# Patient Record
Sex: MALE | Race: WHITE | Employment: FULL TIME | URBAN - METROPOLITAN AREA
[De-identification: names, ages, dates, MRNs, and addresses within clinical notes are randomized per-mention and may not be internally consistent; named-entity substitution may affect disease eponyms.]

---

## 2017-03-02 ENCOUNTER — HOSPITAL ENCOUNTER (EMERGENCY)
Age: 52
Discharge: HOME | End: 2017-03-02
Attending: EMERGENCY MEDICINE
Payer: MEDICARE

## 2017-03-02 VITALS
RESPIRATION RATE: 16 BRPM | HEART RATE: 88 BPM | DIASTOLIC BLOOD PRESSURE: 102 MMHG | BODY MASS INDEX: 25.18 KG/M2 | OXYGEN SATURATION: 99 % | WEIGHT: 170 LBS | SYSTOLIC BLOOD PRESSURE: 146 MMHG | HEIGHT: 69 IN | TEMPERATURE: 97.8 F

## 2017-03-02 DIAGNOSIS — I10 ESSENTIAL HYPERTENSION: ICD-10-CM

## 2017-03-02 DIAGNOSIS — R42 EPISODIC LIGHTHEADEDNESS: Primary | ICD-10-CM

## 2017-03-02 LAB
CHP ED QC CHECK: YES
GLUCOSE BLD-MCNC: 124 MG/DL

## 2017-03-02 PROCEDURE — 82947 ASSAY GLUCOSE BLOOD QUANT: CPT

## 2017-03-02 PROCEDURE — 93005 ELECTROCARDIOGRAM TRACING: CPT

## 2017-03-02 PROCEDURE — 99284 EMERGENCY DEPT VISIT MOD MDM: CPT

## 2017-03-02 PROCEDURE — 6370000000 HC RX 637 (ALT 250 FOR IP): Performed by: EMERGENCY MEDICINE

## 2017-03-02 RX ORDER — LISINOPRIL 10 MG/1
10 TABLET ORAL DAILY
Qty: 30 TABLET | Refills: 0 | Status: SHIPPED | OUTPATIENT
Start: 2017-03-02 | End: 2018-05-04 | Stop reason: SDUPTHER

## 2017-03-02 RX ORDER — METOPROLOL TARTRATE 50 MG/1
50 TABLET, FILM COATED ORAL ONCE
Status: COMPLETED | OUTPATIENT
Start: 2017-03-02 | End: 2017-03-02

## 2017-03-02 RX ORDER — OXCARBAZEPINE 600 MG/1
600 TABLET, FILM COATED ORAL 2 TIMES DAILY
Status: ON HOLD | COMMUNITY
End: 2021-12-09 | Stop reason: HOSPADM

## 2017-03-02 RX ORDER — LISINOPRIL 10 MG/1
10 TABLET ORAL DAILY
COMMUNITY
End: 2017-03-02

## 2017-03-02 RX ORDER — BUPROPION HYDROCHLORIDE 300 MG/1
300 TABLET ORAL EVERY MORNING
Status: ON HOLD | COMMUNITY
End: 2021-12-09 | Stop reason: HOSPADM

## 2017-03-02 RX ORDER — LISINOPRIL 10 MG/1
10 TABLET ORAL DAILY
Status: DISCONTINUED | OUTPATIENT
Start: 2017-03-02 | End: 2017-03-02 | Stop reason: HOSPADM

## 2017-03-02 RX ADMIN — LISINOPRIL 10 MG: 10 TABLET ORAL at 17:42

## 2017-03-02 RX ADMIN — METOPROLOL TARTRATE 50 MG: 50 TABLET, FILM COATED ORAL at 17:42

## 2017-03-02 ASSESSMENT — ENCOUNTER SYMPTOMS
EYE REDNESS: 0
NAUSEA: 0
CONSTIPATION: 0
SHORTNESS OF BREATH: 0
ABDOMINAL PAIN: 0
EYE PAIN: 0
DIARRHEA: 0
RHINORRHEA: 0
VOMITING: 0
COUGH: 0

## 2017-03-03 LAB
EKG ATRIAL RATE: 72 BPM
EKG P AXIS: 57 DEGREES
EKG P-R INTERVAL: 162 MS
EKG Q-T INTERVAL: 406 MS
EKG QRS DURATION: 102 MS
EKG QTC CALCULATION (BAZETT): 444 MS
EKG R AXIS: 59 DEGREES
EKG T AXIS: 52 DEGREES
EKG VENTRICULAR RATE: 72 BPM
GLUCOSE BLD-MCNC: 124 MG/DL (ref 75–110)

## 2018-05-04 ENCOUNTER — HOSPITAL ENCOUNTER (OUTPATIENT)
Age: 53
Setting detail: SPECIMEN
Discharge: HOME | End: 2018-05-04
Payer: MEDICARE

## 2018-05-04 ENCOUNTER — OFFICE VISIT (OUTPATIENT)
Dept: INTERNAL MEDICINE | Age: 53
End: 2018-05-04
Payer: MEDICARE

## 2018-05-04 VITALS
WEIGHT: 246 LBS | BODY MASS INDEX: 36.43 KG/M2 | HEIGHT: 69 IN | HEART RATE: 63 BPM | SYSTOLIC BLOOD PRESSURE: 170 MMHG | DIASTOLIC BLOOD PRESSURE: 98 MMHG

## 2018-05-04 DIAGNOSIS — F17.200 SMOKING: ICD-10-CM

## 2018-05-04 DIAGNOSIS — Z12.11 COLON CANCER SCREENING: ICD-10-CM

## 2018-05-04 DIAGNOSIS — J30.9 CHRONIC ALLERGIC RHINITIS, UNSPECIFIED SEASONALITY, UNSPECIFIED TRIGGER: ICD-10-CM

## 2018-05-04 DIAGNOSIS — Z00.00 HEALTH CARE MAINTENANCE: ICD-10-CM

## 2018-05-04 DIAGNOSIS — I10 ESSENTIAL HYPERTENSION: ICD-10-CM

## 2018-05-04 DIAGNOSIS — F31.4 SEVERE BIPOLAR I DISORDER, CURRENT OR MOST RECENT EPISODE DEPRESSED (HCC): Chronic | ICD-10-CM

## 2018-05-04 DIAGNOSIS — I10 ESSENTIAL HYPERTENSION: Primary | ICD-10-CM

## 2018-05-04 LAB
ABSOLUTE EOS #: 0.09 K/UL (ref 0–0.44)
ABSOLUTE IMMATURE GRANULOCYTE: 0.1 K/UL (ref 0–0.3)
ABSOLUTE LYMPH #: 2.12 K/UL (ref 1.1–3.7)
ABSOLUTE MONO #: 1.13 K/UL (ref 0.1–1.2)
ALBUMIN SERPL-MCNC: 4.3 G/DL (ref 3.5–5.2)
ALBUMIN/GLOBULIN RATIO: 1.5 (ref 1–2.5)
ALP BLD-CCNC: 82 U/L (ref 40–129)
ALT SERPL-CCNC: 19 U/L (ref 5–41)
ANION GAP SERPL CALCULATED.3IONS-SCNC: 15 MMOL/L (ref 9–17)
AST SERPL-CCNC: 20 U/L
BASOPHILS # BLD: 1 % (ref 0–2)
BASOPHILS ABSOLUTE: 0.08 K/UL (ref 0–0.2)
BILIRUB SERPL-MCNC: 0.32 MG/DL (ref 0.3–1.2)
BUN BLDV-MCNC: 14 MG/DL (ref 6–20)
BUN/CREAT BLD: ABNORMAL (ref 9–20)
CALCIUM SERPL-MCNC: 8.9 MG/DL (ref 8.6–10.4)
CHLORIDE BLD-SCNC: 87 MMOL/L (ref 98–107)
CHOLESTEROL/HDL RATIO: 5.6
CHOLESTEROL: 219 MG/DL
CO2: 21 MMOL/L (ref 20–31)
CREAT SERPL-MCNC: 0.77 MG/DL (ref 0.7–1.2)
CREATININE URINE: 104.8 MG/DL (ref 39–259)
DIFFERENTIAL TYPE: ABNORMAL
EOSINOPHILS RELATIVE PERCENT: 1 % (ref 1–4)
ESTIMATED AVERAGE GLUCOSE: 117 MG/DL
GFR AFRICAN AMERICAN: >60 ML/MIN
GFR NON-AFRICAN AMERICAN: >60 ML/MIN
GFR SERPL CREATININE-BSD FRML MDRD: ABNORMAL ML/MIN/{1.73_M2}
GFR SERPL CREATININE-BSD FRML MDRD: ABNORMAL ML/MIN/{1.73_M2}
GLUCOSE BLD-MCNC: 99 MG/DL (ref 70–99)
HBA1C MFR BLD: 5.7 % (ref 4–6)
HCT VFR BLD CALC: 41 % (ref 40.7–50.3)
HDLC SERPL-MCNC: 39 MG/DL
HEMOGLOBIN: 14.7 G/DL (ref 13–17)
IMMATURE GRANULOCYTES: 1 %
LDL CHOLESTEROL: 162 MG/DL (ref 0–130)
LYMPHOCYTES # BLD: 16 % (ref 24–43)
MCH RBC QN AUTO: 29.8 PG (ref 25.2–33.5)
MCHC RBC AUTO-ENTMCNC: 35.9 G/DL (ref 28.4–34.8)
MCV RBC AUTO: 83 FL (ref 82.6–102.9)
MICROALBUMIN/CREAT 24H UR: 58 MG/L
MICROALBUMIN/CREAT UR-RTO: 55 MCG/MG CREAT
MONOCYTES # BLD: 8 % (ref 3–12)
NRBC AUTOMATED: 0 PER 100 WBC
PDW BLD-RTO: 13.9 % (ref 11.8–14.4)
PLATELET # BLD: 391 K/UL (ref 138–453)
PLATELET ESTIMATE: ABNORMAL
PMV BLD AUTO: 8.8 FL (ref 8.1–13.5)
POTASSIUM SERPL-SCNC: 4.3 MMOL/L (ref 3.7–5.3)
RBC # BLD: 4.94 M/UL (ref 4.21–5.77)
RBC # BLD: ABNORMAL 10*6/UL
SEG NEUTROPHILS: 73 % (ref 36–65)
SEGMENTED NEUTROPHILS ABSOLUTE COUNT: 9.97 K/UL (ref 1.5–8.1)
SODIUM BLD-SCNC: 123 MMOL/L (ref 135–144)
TOTAL PROTEIN: 7.1 G/DL (ref 6.4–8.3)
TRIGL SERPL-MCNC: 89 MG/DL
TSH SERPL DL<=0.05 MIU/L-ACNC: 0.97 MIU/L (ref 0.3–5)
VLDLC SERPL CALC-MCNC: ABNORMAL MG/DL (ref 1–30)
WBC # BLD: 13.5 K/UL (ref 3.5–11.3)
WBC # BLD: ABNORMAL 10*3/UL

## 2018-05-04 PROCEDURE — 83036 HEMOGLOBIN GLYCOSYLATED A1C: CPT

## 2018-05-04 PROCEDURE — 84443 ASSAY THYROID STIM HORMONE: CPT

## 2018-05-04 PROCEDURE — 80053 COMPREHEN METABOLIC PANEL: CPT

## 2018-05-04 PROCEDURE — 87389 HIV-1 AG W/HIV-1&-2 AB AG IA: CPT

## 2018-05-04 PROCEDURE — 99204 OFFICE O/P NEW MOD 45 MIN: CPT | Performed by: INTERNAL MEDICINE

## 2018-05-04 PROCEDURE — G8417 CALC BMI ABV UP PARAM F/U: HCPCS | Performed by: INTERNAL MEDICINE

## 2018-05-04 PROCEDURE — 82043 UR ALBUMIN QUANTITATIVE: CPT

## 2018-05-04 PROCEDURE — 99213 OFFICE O/P EST LOW 20 MIN: CPT | Performed by: INTERNAL MEDICINE

## 2018-05-04 PROCEDURE — 36415 COLL VENOUS BLD VENIPUNCTURE: CPT

## 2018-05-04 PROCEDURE — 80061 LIPID PANEL: CPT

## 2018-05-04 PROCEDURE — 82570 ASSAY OF URINE CREATININE: CPT

## 2018-05-04 PROCEDURE — 4004F PT TOBACCO SCREEN RCVD TLK: CPT | Performed by: INTERNAL MEDICINE

## 2018-05-04 PROCEDURE — G8427 DOCREV CUR MEDS BY ELIG CLIN: HCPCS | Performed by: INTERNAL MEDICINE

## 2018-05-04 PROCEDURE — 3017F COLORECTAL CA SCREEN DOC REV: CPT | Performed by: INTERNAL MEDICINE

## 2018-05-04 PROCEDURE — 85025 COMPLETE CBC W/AUTO DIFF WBC: CPT

## 2018-05-04 RX ORDER — CETIRIZINE HYDROCHLORIDE 10 MG/1
10 TABLET ORAL DAILY
Qty: 30 TABLET | Refills: 2 | Status: SHIPPED | OUTPATIENT
Start: 2018-05-04 | End: 2019-05-03 | Stop reason: SDUPTHER

## 2018-05-04 RX ORDER — FLUTICASONE PROPIONATE 50 MCG
2 SPRAY, SUSPENSION (ML) NASAL DAILY
Qty: 1 BOTTLE | Refills: 2 | Status: SHIPPED | OUTPATIENT
Start: 2018-05-04 | End: 2018-08-15 | Stop reason: SDUPTHER

## 2018-05-04 RX ORDER — LISINOPRIL 20 MG/1
20 TABLET ORAL DAILY
Qty: 30 TABLET | Refills: 2 | Status: SHIPPED | OUTPATIENT
Start: 2018-05-04 | End: 2018-06-22 | Stop reason: SDUPTHER

## 2018-05-04 ASSESSMENT — PATIENT HEALTH QUESTIONNAIRE - PHQ9
1. LITTLE INTEREST OR PLEASURE IN DOING THINGS: 0
SUM OF ALL RESPONSES TO PHQ9 QUESTIONS 1 & 2: 0
SUM OF ALL RESPONSES TO PHQ QUESTIONS 1-9: 0
2. FEELING DOWN, DEPRESSED OR HOPELESS: 0

## 2018-05-05 LAB — HIV AG/AB: NONREACTIVE

## 2018-05-07 DIAGNOSIS — E87.1 HYPONATREMIA: Primary | ICD-10-CM

## 2018-05-24 DIAGNOSIS — Z12.11 COLON CANCER SCREENING: Primary | ICD-10-CM

## 2018-05-24 RX ORDER — SODIUM, POTASSIUM,MAG SULFATES 17.5-3.13G
SOLUTION, RECONSTITUTED, ORAL ORAL
Qty: 1 BOTTLE | Refills: 0 | Status: SHIPPED | OUTPATIENT
Start: 2018-05-24 | End: 2018-07-03

## 2018-06-01 ENCOUNTER — TELEPHONE (OUTPATIENT)
Dept: INTERNAL MEDICINE | Age: 53
End: 2018-06-01

## 2018-06-19 LAB
CONTROL: NORMAL
HEMOCCULT STL QL: NEGATIVE

## 2018-06-21 ENCOUNTER — HOSPITAL ENCOUNTER (OUTPATIENT)
Age: 53
Discharge: HOME | End: 2018-06-21
Payer: MEDICARE

## 2018-06-21 DIAGNOSIS — Z12.12 SCREENING FOR COLORECTAL CANCER: Primary | ICD-10-CM

## 2018-06-21 DIAGNOSIS — Z12.11 SCREENING FOR COLORECTAL CANCER: Primary | ICD-10-CM

## 2018-06-21 PROCEDURE — 82274 ASSAY TEST FOR BLOOD FECAL: CPT | Performed by: INTERNAL MEDICINE

## 2018-06-22 ENCOUNTER — OFFICE VISIT (OUTPATIENT)
Dept: INTERNAL MEDICINE | Age: 53
End: 2018-06-22
Payer: MEDICARE

## 2018-06-22 VITALS
BODY MASS INDEX: 35.7 KG/M2 | DIASTOLIC BLOOD PRESSURE: 87 MMHG | WEIGHT: 241 LBS | SYSTOLIC BLOOD PRESSURE: 140 MMHG | HEART RATE: 55 BPM | HEIGHT: 69 IN

## 2018-06-22 DIAGNOSIS — E87.1 HYPONATREMIA: Primary | ICD-10-CM

## 2018-06-22 DIAGNOSIS — I10 ESSENTIAL HYPERTENSION: ICD-10-CM

## 2018-06-22 DIAGNOSIS — E78.5 DYSLIPIDEMIA: ICD-10-CM

## 2018-06-22 DIAGNOSIS — J42 CHRONIC BRONCHITIS, UNSPECIFIED CHRONIC BRONCHITIS TYPE (HCC): ICD-10-CM

## 2018-06-22 PROCEDURE — G8417 CALC BMI ABV UP PARAM F/U: HCPCS | Performed by: INTERNAL MEDICINE

## 2018-06-22 PROCEDURE — G8926 SPIRO NO PERF OR DOC: HCPCS | Performed by: INTERNAL MEDICINE

## 2018-06-22 PROCEDURE — 99214 OFFICE O/P EST MOD 30 MIN: CPT | Performed by: INTERNAL MEDICINE

## 2018-06-22 PROCEDURE — G8427 DOCREV CUR MEDS BY ELIG CLIN: HCPCS | Performed by: INTERNAL MEDICINE

## 2018-06-22 PROCEDURE — 3017F COLORECTAL CA SCREEN DOC REV: CPT | Performed by: INTERNAL MEDICINE

## 2018-06-22 PROCEDURE — 99213 OFFICE O/P EST LOW 20 MIN: CPT | Performed by: INTERNAL MEDICINE

## 2018-06-22 PROCEDURE — 3023F SPIROM DOC REV: CPT | Performed by: INTERNAL MEDICINE

## 2018-06-22 PROCEDURE — 4004F PT TOBACCO SCREEN RCVD TLK: CPT | Performed by: INTERNAL MEDICINE

## 2018-06-22 RX ORDER — LISINOPRIL 30 MG/1
30 TABLET ORAL DAILY
Qty: 30 TABLET | Refills: 2 | Status: SHIPPED | OUTPATIENT
Start: 2018-06-22 | End: 2018-08-15 | Stop reason: SDUPTHER

## 2018-06-22 RX ORDER — GABAPENTIN 800 MG/1
1 TABLET ORAL 3 TIMES DAILY
Refills: 0 | Status: ON HOLD | COMMUNITY
Start: 2018-06-13 | End: 2021-12-09 | Stop reason: HOSPADM

## 2018-06-22 RX ORDER — GABAPENTIN 300 MG/1
1 CAPSULE ORAL NIGHTLY
Refills: 0 | Status: ON HOLD | COMMUNITY
Start: 2018-06-13 | End: 2021-12-09 | Stop reason: HOSPADM

## 2018-06-22 RX ORDER — ATORVASTATIN CALCIUM 40 MG/1
40 TABLET, FILM COATED ORAL DAILY
Qty: 30 TABLET | Refills: 2 | Status: SHIPPED | OUTPATIENT
Start: 2018-06-22 | End: 2018-08-15 | Stop reason: SDUPTHER

## 2018-06-22 RX ORDER — VORTIOXETINE 10 MG/1
3 TABLET, FILM COATED ORAL DAILY
Refills: 0 | Status: ON HOLD | COMMUNITY
Start: 2018-06-04 | End: 2021-12-09 | Stop reason: HOSPADM

## 2018-06-22 RX ORDER — METOPROLOL TARTRATE 50 MG/1
1 TABLET, FILM COATED ORAL DAILY
Refills: 0 | COMMUNITY
Start: 2018-05-03 | End: 2018-06-22

## 2018-07-03 ENCOUNTER — OFFICE VISIT (OUTPATIENT)
Dept: INTERNAL MEDICINE | Age: 53
End: 2018-07-03

## 2018-07-03 VITALS
DIASTOLIC BLOOD PRESSURE: 94 MMHG | HEIGHT: 70 IN | BODY MASS INDEX: 35.79 KG/M2 | HEART RATE: 62 BPM | SYSTOLIC BLOOD PRESSURE: 168 MMHG | WEIGHT: 250 LBS

## 2018-07-03 DIAGNOSIS — J42 CHRONIC BRONCHITIS, UNSPECIFIED CHRONIC BRONCHITIS TYPE (HCC): ICD-10-CM

## 2018-07-03 DIAGNOSIS — I10 ESSENTIAL HYPERTENSION: Primary | ICD-10-CM

## 2018-07-03 DIAGNOSIS — H66.91 OTITIS OF RIGHT EAR: ICD-10-CM

## 2018-07-03 DIAGNOSIS — E78.5 DYSLIPIDEMIA: ICD-10-CM

## 2018-07-03 PROCEDURE — 3017F COLORECTAL CA SCREEN DOC REV: CPT | Performed by: STUDENT IN AN ORGANIZED HEALTH CARE EDUCATION/TRAINING PROGRAM

## 2018-07-03 PROCEDURE — G8926 SPIRO NO PERF OR DOC: HCPCS | Performed by: STUDENT IN AN ORGANIZED HEALTH CARE EDUCATION/TRAINING PROGRAM

## 2018-07-03 PROCEDURE — 99213 OFFICE O/P EST LOW 20 MIN: CPT | Performed by: INTERNAL MEDICINE

## 2018-07-03 PROCEDURE — 99213 OFFICE O/P EST LOW 20 MIN: CPT | Performed by: STUDENT IN AN ORGANIZED HEALTH CARE EDUCATION/TRAINING PROGRAM

## 2018-07-03 PROCEDURE — 3023F SPIROM DOC REV: CPT | Performed by: STUDENT IN AN ORGANIZED HEALTH CARE EDUCATION/TRAINING PROGRAM

## 2018-07-03 PROCEDURE — G8417 CALC BMI ABV UP PARAM F/U: HCPCS | Performed by: STUDENT IN AN ORGANIZED HEALTH CARE EDUCATION/TRAINING PROGRAM

## 2018-07-03 PROCEDURE — G8427 DOCREV CUR MEDS BY ELIG CLIN: HCPCS | Performed by: STUDENT IN AN ORGANIZED HEALTH CARE EDUCATION/TRAINING PROGRAM

## 2018-07-03 PROCEDURE — 4004F PT TOBACCO SCREEN RCVD TLK: CPT | Performed by: STUDENT IN AN ORGANIZED HEALTH CARE EDUCATION/TRAINING PROGRAM

## 2018-07-03 RX ORDER — AMLODIPINE BESYLATE 2.5 MG/1
2.5 TABLET ORAL DAILY
Qty: 30 TABLET | Refills: 2 | Status: SHIPPED | OUTPATIENT
Start: 2018-07-03 | End: 2018-08-15 | Stop reason: SDUPTHER

## 2018-07-03 RX ORDER — FLUTICASONE PROPIONATE 50 MCG
1 SPRAY, SUSPENSION (ML) NASAL DAILY
Qty: 1 BOTTLE | Refills: 0 | Status: SHIPPED | OUTPATIENT
Start: 2018-07-03 | End: 2019-05-03 | Stop reason: SDUPTHER

## 2018-07-03 RX ORDER — PSEUDOEPHEDRINE HYDROCHLORIDE 30 MG/1
30 TABLET ORAL EVERY 6 HOURS PRN
Qty: 21 TABLET | Refills: 0 | Status: CANCELLED | OUTPATIENT
Start: 2018-07-03 | End: 2019-07-03

## 2018-07-03 RX ORDER — AZITHROMYCIN 250 MG/1
250 TABLET, FILM COATED ORAL DAILY
Qty: 1 PACKET | Refills: 0 | Status: SHIPPED | OUTPATIENT
Start: 2018-07-03 | End: 2018-07-06

## 2018-07-03 NOTE — PROGRESS NOTES
Visit Information    Have you changed or started any medications since your last visit including any over-the-counter medicines, vitamins, or herbal medicines? no   Are you having any side effects from any of your medications? -  no  Have you stopped taking any of your medications? Is so, why? -  yes -     Have you seen any other physician or provider since your last visit? No  Have you had any other diagnostic tests since your last visit? No  Have you been seen in the emergency room and/or had an admission to a hospital since we last saw you? No  Have you had your routine dental cleaning in the past 6 months? no    Have you activated your [employer] account? If not, what are your barriers?  No:      Patient Care Team:  Deb Casas MD as PCP - General (Internal Medicine)    Medical History Review  Past Medical, Family, and Social History reviewed and does contribute to the patient presenting condition    Health Maintenance   Topic Date Due    DTaP/Tdap/Td vaccine (1 - Tdap) 12/17/1985    Pneumococcal med risk (1 of 1 - PPSV23) 12/17/1985    Shingles Vaccine (1 of 2 - 2 Dose Series) 12/17/2016    Flu vaccine (1) 09/01/2018    A1C test (Diabetic or Prediabetic)  05/04/2019    Potassium monitoring  05/04/2019    Creatinine monitoring  05/04/2019    Colon Cancer Screen FIT/FOBT  06/19/2019    Lipid screen  05/04/2023    HIV screen  Completed

## 2018-07-03 NOTE — PATIENT INSTRUCTIONS
Your medications for this visit were escribed to your preferred pharmacy. Avs was given and reviewed appt card given with next appt. It is very important that you keep this appointment, if you need to cancel please call [phone] with any questions.

## 2018-08-15 ENCOUNTER — OFFICE VISIT (OUTPATIENT)
Dept: INTERNAL MEDICINE | Age: 53
End: 2018-08-15

## 2018-08-15 VITALS
HEART RATE: 72 BPM | WEIGHT: 248 LBS | TEMPERATURE: 97.2 F | SYSTOLIC BLOOD PRESSURE: 127 MMHG | HEIGHT: 69 IN | BODY MASS INDEX: 36.73 KG/M2 | DIASTOLIC BLOOD PRESSURE: 81 MMHG

## 2018-08-15 DIAGNOSIS — J42 CHRONIC BRONCHITIS, UNSPECIFIED CHRONIC BRONCHITIS TYPE (HCC): ICD-10-CM

## 2018-08-15 DIAGNOSIS — F31.4 SEVERE BIPOLAR I DISORDER, CURRENT OR MOST RECENT EPISODE DEPRESSED (HCC): Chronic | ICD-10-CM

## 2018-08-15 DIAGNOSIS — K21.9 GASTROESOPHAGEAL REFLUX DISEASE WITHOUT ESOPHAGITIS: ICD-10-CM

## 2018-08-15 DIAGNOSIS — I10 ESSENTIAL HYPERTENSION: Primary | ICD-10-CM

## 2018-08-15 DIAGNOSIS — R11.2 NAUSEA AND VOMITING, INTRACTABILITY OF VOMITING NOT SPECIFIED, UNSPECIFIED VOMITING TYPE: ICD-10-CM

## 2018-08-15 DIAGNOSIS — E78.5 DYSLIPIDEMIA: ICD-10-CM

## 2018-08-15 PROCEDURE — 99211 OFF/OP EST MAY X REQ PHY/QHP: CPT | Performed by: INTERNAL MEDICINE

## 2018-08-15 PROCEDURE — G8427 DOCREV CUR MEDS BY ELIG CLIN: HCPCS | Performed by: INTERNAL MEDICINE

## 2018-08-15 PROCEDURE — 4004F PT TOBACCO SCREEN RCVD TLK: CPT | Performed by: INTERNAL MEDICINE

## 2018-08-15 PROCEDURE — G8926 SPIRO NO PERF OR DOC: HCPCS | Performed by: INTERNAL MEDICINE

## 2018-08-15 PROCEDURE — G8417 CALC BMI ABV UP PARAM F/U: HCPCS | Performed by: INTERNAL MEDICINE

## 2018-08-15 PROCEDURE — 3023F SPIROM DOC REV: CPT | Performed by: INTERNAL MEDICINE

## 2018-08-15 PROCEDURE — 99214 OFFICE O/P EST MOD 30 MIN: CPT | Performed by: INTERNAL MEDICINE

## 2018-08-15 PROCEDURE — 3017F COLORECTAL CA SCREEN DOC REV: CPT | Performed by: INTERNAL MEDICINE

## 2018-08-15 RX ORDER — LISINOPRIL 30 MG/1
30 TABLET ORAL DAILY
Qty: 30 TABLET | Refills: 2 | Status: SHIPPED | OUTPATIENT
Start: 2018-08-15 | End: 2019-05-03 | Stop reason: SDUPTHER

## 2018-08-15 RX ORDER — OMEPRAZOLE 20 MG/1
20 CAPSULE, DELAYED RELEASE ORAL
Qty: 30 CAPSULE | Refills: 3 | Status: SHIPPED | OUTPATIENT
Start: 2018-08-15 | End: 2019-05-03 | Stop reason: SDUPTHER

## 2018-08-15 RX ORDER — AMLODIPINE BESYLATE 2.5 MG/1
2.5 TABLET ORAL DAILY
Qty: 30 TABLET | Refills: 2 | Status: SHIPPED | OUTPATIENT
Start: 2018-08-15 | End: 2019-05-03 | Stop reason: ALTCHOICE

## 2018-08-15 RX ORDER — ONDANSETRON 4 MG/1
4 TABLET, FILM COATED ORAL DAILY PRN
Qty: 30 TABLET | Refills: 0 | Status: ON HOLD | OUTPATIENT
Start: 2018-08-15 | End: 2021-12-09 | Stop reason: HOSPADM

## 2018-08-15 RX ORDER — ATORVASTATIN CALCIUM 40 MG/1
40 TABLET, FILM COATED ORAL DAILY
Qty: 30 TABLET | Refills: 2 | Status: SHIPPED | OUTPATIENT
Start: 2018-08-15 | End: 2019-05-03 | Stop reason: SDUPTHER

## 2018-08-15 NOTE — PROGRESS NOTES
[facility]  [facility] [address] [facility]  [address]  Dept: [phone]      Today's Date: 8/15/2018  Patient Name: Abiel Francis  Patient's age: 47 y. o., 1965        CHIEF COMPLAINT:    Chief Complaint   Patient presents with    Hypertension     Pt last seen on 7/3/18    COPD     Pt last seen on 7/3/18    Health Maintenance     Due for [id number], Tdap, and Shingles, Discuss with pt.  Nausea & Vomiting     Pt woke up this morning nauseated, dizzy, and threw up a few times       History Obtained From:  patient    HISTORY OF PRESENT ILLNESS:      The patient is a 47 y.o. old  male and is here to Follow-up for hypertension and COPD. His blood pressure is well controlled with amlodipine 2.5 mg daily, lisinopril 30 mg daily  His COPD is also stable. He is on the Spiriva and Ventolin. He has been complaining of nausea and vomiting since morning. He has had 2 episodes however denies any hematemesis, abdominal pain, diarrhea or constipation. He has symptoms of heartburn. He continues to smoke. Denies any drinking alcohol. No history of NSAID use. Patient Active Problem List   Diagnosis    Severe bipolar I disorder, current or most recent episode depressed ([address]    HTN (hypertension)    Irritable bowel syndrome    GERD (gastroesophageal reflux disease)    Chronic back pain    Chronic diarrhea    Opioid withdrawal ([facility]       Past Medical History:   has a past medical history of Bipolar disorder ([facility] [address] and Hypertension. Past Surgical History:   has no past surgical history on file. Medications:    Current Outpatient Prescriptions   Medication Sig Dispense Refill    amLODIPine (NORVASC) 2.5 MG tablet Take 1 tablet by mouth daily 30 tablet 2    fluticasone (FLONASE) 50 MCG/ACT nasal spray 1 spray by Nasal route daily 1 Bottle 0    gabapentin (NEURONTIN) 800 MG tablet Take 1 tablet by mouth three times daily. Estefania Pretty   0    gabapentin Component Value Date    LABMICR 55 (H) 05/04/2018     No components found for: CHLPL  Lab Results   Component Value Date    TRIG 89 05/04/2018     Lab Results   Component Value Date    HDL 39 (L) 05/04/2018     Lab Results   Component Value Date    LDLCHOLESTEROL 162 (H) 05/04/2018     The 10-year ASCVD risk score (Santiago Colunga, et al., 2013) is: 13.4%    Values used to calculate the score:      Age: 47 years      Sex: Male      Is Non- : No      Diabetic: No      Tobacco smoker: Yes      Systolic Blood Pressure: 712 mmHg      Is BP treated: Yes      HDL Cholesterol: 39 mg/dL      Total Cholesterol: 219 mg/dL    Health Maintenance Due   Topic Date Due    DTaP/Tdap/Td vaccine (1 - Tdap) 12/17/1985    Pneumococcal med risk (1 of 1 - PPSV23) 12/17/1985    Shingles Vaccine (1 of 2 - 2 Dose Series) 12/17/2016        ASSESSMENT AND PLAN    1. Essential hypertension    - amLODIPine (NORVASC) 2.5 MG tablet; Take 1 tablet by mouth daily  Dispense: 30 tablet; Refill: 2  - lisinopril (PRINIVIL;ZESTRIL) 30 MG tablet; Take 1 tablet by mouth daily  Dispense: 30 tablet; Refill: 2  - metoprolol tartrate (LOPRESSOR) 25 MG tablet; Take 1 tablet by mouth 2 times daily  Dispense: 60 tablet; Refill: 2    2. Gastroesophageal reflux disease without esophagitis    - omeprazole (PRILOSEC) 20 MG delayed release capsule; Take 1 capsule by mouth daily (with breakfast)  Dispense: 30 capsule; Refill: 3    3. Severe bipolar I disorder, current or most recent episode depressed ([facility]      4. Nausea and vomiting, intractability of vomiting not specified, unspecified vomiting type    - Comprehensive Metabolic Panel; Future  - CBC Auto Differential; Future  - ondansetron (ZOFRAN) 4 MG tablet; Take 1 tablet by mouth daily as needed for Nausea or Vomiting  Dispense: 30 tablet; Refill: 0    5. Dyslipidemia    - atorvastatin (LIPITOR) 40 MG tablet; Take 1 tablet by mouth daily  Dispense: 30 tablet; Refill: 2    6.  Chronic

## 2018-09-28 ENCOUNTER — TELEPHONE (OUTPATIENT)
Dept: INTERNAL MEDICINE | Age: 53
End: 2018-09-28

## 2018-09-28 NOTE — LETTER
[address]  [address] [facility] [address] [facility]  Phone: [phone]  Fax: [phone]    Nick Ramachandran MD        September 28, 2018    Shan Rush  [address] [place] [id number]      Dear Saleem Meléndez: We are sending this letter because your PCP ordered [facility] for you to have done at your last visit here and they have not yet been completed. If you can please come to our office on the 2nd floor to  your orders to have them compelted. If you do not have a follow-up appointment scheduled you can either contact the office to make an appointment with us or you can make one when you come in to pick-up your orders. If you have any questions or concerns, please don't hesitate to call.     Sincerely,        Nick Ramachandran MD

## 2018-10-10 ENCOUNTER — TELEPHONE (OUTPATIENT)
Dept: INTERNAL MEDICINE | Age: 53
End: 2018-10-10

## 2019-04-12 RX ORDER — METOPROLOL TARTRATE 50 MG/1
TABLET, FILM COATED ORAL
Qty: 60 TABLET | Refills: 0 | Status: SHIPPED | OUTPATIENT
Start: 2019-04-12 | End: 2019-05-03 | Stop reason: SDUPTHER

## 2019-04-12 NOTE — TELEPHONE ENCOUNTER
Escribe request for Lopressor . Please escribe if appropriate      Next Visit Date:  5/3/2019     Health Maintenance   Topic Date Due    Pneumococcal 0-64 years Vaccine (1 of 1 - PPSV23) 12/17/1972    DTaP/Tdap/Td vaccine (1 - Tdap) 12/17/1985    Shingles Vaccine (1 of 2) 12/17/2016    A1C test (Diabetic or Prediabetic)  05/04/2019    Potassium monitoring  05/04/2019    Creatinine monitoring  05/04/2019    Colon Cancer Screen FIT/FOBT  06/19/2019    Flu vaccine (Season Ended) 09/01/2019    Lipid screen  05/04/2023    HIV screen  Completed       Hemoglobin A1C (%)   Date Value   05/04/2018 5.7             ( goal A1C is < 7)   Microalb/Crt.  Ratio (mcg/mg creat)   Date Value   05/04/2018 55 (H)     LDL Cholesterol (mg/dL)   Date Value   05/04/2018 162 (H)       (goal LDL is <100)   AST (U/L)   Date Value   05/04/2018 20     ALT (U/L)   Date Value   05/04/2018 19     BUN (mg/dL)   Date Value   05/04/2018 14     BP Readings from Last 3 Encounters:   08/15/18 127/81   07/03/18 (!) 168/94   06/22/18 (!) 140/87          (goal 120/80)          Patient Active Problem List:     Severe bipolar I disorder, current or most recent episode depressed (HCC)     HTN (hypertension)     Irritable bowel syndrome     GERD (gastroesophageal reflux disease)     Chronic back pain     Chronic diarrhea     Opioid withdrawal ([facility] [address]

## 2019-04-13 ENCOUNTER — TELEPHONE (OUTPATIENT)
Dept: GASTROENTEROLOGY | Age: 54
End: 2019-04-13

## 2019-04-13 NOTE — TELEPHONE ENCOUNTER
Pt was originally scheduled for colonoscopy on 6/27/2018, but called in & cancelled. Writer spoke to patient over phone to see if he would like to reschedule and pt states he did the Cologuard test and everything came out fine and he did not want to reschedule colonoscopy procedure.   Referral is being returned to ordering office d/t pt refusal

## 2019-05-03 ENCOUNTER — OFFICE VISIT (OUTPATIENT)
Dept: INTERNAL MEDICINE | Age: 54
End: 2019-05-03
Payer: MEDICARE

## 2019-05-03 VITALS
SYSTOLIC BLOOD PRESSURE: 136 MMHG | WEIGHT: 235 LBS | DIASTOLIC BLOOD PRESSURE: 82 MMHG | HEART RATE: 56 BPM | BODY MASS INDEX: 34.7 KG/M2

## 2019-05-03 DIAGNOSIS — E78.5 DYSLIPIDEMIA: ICD-10-CM

## 2019-05-03 DIAGNOSIS — I10 ESSENTIAL HYPERTENSION: ICD-10-CM

## 2019-05-03 DIAGNOSIS — J42 CHRONIC BRONCHITIS, UNSPECIFIED CHRONIC BRONCHITIS TYPE (HCC): ICD-10-CM

## 2019-05-03 DIAGNOSIS — F17.200 SMOKING: ICD-10-CM

## 2019-05-03 DIAGNOSIS — J30.2 SEASONAL ALLERGIC RHINITIS, UNSPECIFIED TRIGGER: ICD-10-CM

## 2019-05-03 DIAGNOSIS — R73.03 PREDIABETES: Primary | ICD-10-CM

## 2019-05-03 DIAGNOSIS — K21.9 GASTROESOPHAGEAL REFLUX DISEASE WITHOUT ESOPHAGITIS: ICD-10-CM

## 2019-05-03 PROCEDURE — 99211 OFF/OP EST MAY X REQ PHY/QHP: CPT | Performed by: INTERNAL MEDICINE

## 2019-05-03 PROCEDURE — 4004F PT TOBACCO SCREEN RCVD TLK: CPT | Performed by: INTERNAL MEDICINE

## 2019-05-03 PROCEDURE — 99214 OFFICE O/P EST MOD 30 MIN: CPT | Performed by: INTERNAL MEDICINE

## 2019-05-03 PROCEDURE — 3023F SPIROM DOC REV: CPT | Performed by: INTERNAL MEDICINE

## 2019-05-03 PROCEDURE — G8926 SPIRO NO PERF OR DOC: HCPCS | Performed by: INTERNAL MEDICINE

## 2019-05-03 PROCEDURE — 3017F COLORECTAL CA SCREEN DOC REV: CPT | Performed by: INTERNAL MEDICINE

## 2019-05-03 PROCEDURE — G8427 DOCREV CUR MEDS BY ELIG CLIN: HCPCS | Performed by: INTERNAL MEDICINE

## 2019-05-03 PROCEDURE — G8417 CALC BMI ABV UP PARAM F/U: HCPCS | Performed by: INTERNAL MEDICINE

## 2019-05-03 RX ORDER — OMEPRAZOLE 20 MG/1
20 CAPSULE, DELAYED RELEASE ORAL
Qty: 30 CAPSULE | Refills: 3 | Status: SHIPPED | OUTPATIENT
Start: 2019-05-03 | End: 2019-06-10 | Stop reason: SDUPTHER

## 2019-05-03 RX ORDER — CETIRIZINE HYDROCHLORIDE 10 MG/1
10 TABLET ORAL DAILY
Qty: 30 TABLET | Refills: 2 | Status: SHIPPED | OUTPATIENT
Start: 2019-05-03 | End: 2019-07-22 | Stop reason: SDUPTHER

## 2019-05-03 RX ORDER — ATORVASTATIN CALCIUM 40 MG/1
40 TABLET, FILM COATED ORAL DAILY
Qty: 30 TABLET | Refills: 2 | Status: SHIPPED | OUTPATIENT
Start: 2019-05-03 | End: 2019-06-10 | Stop reason: SDUPTHER

## 2019-05-03 RX ORDER — FLUTICASONE PROPIONATE 50 MCG
1 SPRAY, SUSPENSION (ML) NASAL DAILY
Qty: 1 BOTTLE | Refills: 0 | Status: SHIPPED | OUTPATIENT
Start: 2019-05-03 | End: 2019-05-30 | Stop reason: SDUPTHER

## 2019-05-03 RX ORDER — LISINOPRIL 40 MG/1
40 TABLET ORAL DAILY
Qty: 90 TABLET | Refills: 1 | Status: SHIPPED | OUTPATIENT
Start: 2019-05-03 | End: 2019-06-10 | Stop reason: SDUPTHER

## 2019-05-03 NOTE — PATIENT INSTRUCTIONS
Dr would like to see you in 2 month(s), you were placed on a wait list and will be called to schedule. Please call the office at [phone] to schedule if needed to be seen sooner. LABORATORY INSTRUCTIONS    Your doctor has ordered blood or urine testing. You can get this testing done at the Lab located on the first floor of the [facility], or at any other [facility]. Please stop at Main Registration, before going to the lab, as you must be registered first.     Please get this lab done before your next visit. You may NOT eat, drink, smoke, or chew anything before this test for 8 hours. You may still have water.

## 2019-05-03 NOTE — PROGRESS NOTES
HYPERTENSION visit     BP Readings from Last 3 Encounters:   08/15/18 127/81   07/03/18 (!) 168/94   06/22/18 (!) 140/87       LDL Cholesterol (mg/dL)   Date Value   05/04/2018 162 (H)     HDL (mg/dL)   Date Value   05/04/2018 39 (L)     BUN (mg/dL)   Date Value   05/04/2018 14     CREATININE (mg/dL)   Date Value   05/04/2018 0.77     Glucose (mg/dL)   Date Value   05/04/2018 99              Have you changed or started any medications since your last visit including any over-the-counter medicines, vitamins, or herbal medicines? no   Have you stopped taking any of your medications? Is so, why? -  no  Are you having any side effects from any of your medications? - no  How often do you miss doses of your medication? rare      Have you seen any other physician or provider since your last visit? yes    Have you had any other diagnostic tests since your last visit?  no   Have you been seen in the emergency room and/or had an admission in a hospital since we last saw you?  no   Have you had your routine dental cleaning in the past 6 months?  no     Do you have an active MyChart account? If no, what is the barrier?   Yes    Patient Care Team:  Keiry Ramirez MD as PCP - General (Internal Medicine)    Medical History Review  Past Medical, Family, and Social History reviewed and does contribute to the patient presenting condition    Health Maintenance   Topic Date Due    Pneumococcal 0-64 years Vaccine (1 of 1 - PPSV23) 12/17/1972    DTaP/Tdap/Td vaccine (1 - Tdap) 12/17/1985    Shingles Vaccine (1 of 2) 12/17/2016    A1C test (Diabetic or Prediabetic)  05/04/2019    Potassium monitoring  05/04/2019    Creatinine monitoring  05/04/2019    Colon Cancer Screen FIT/FOBT  06/19/2019    Flu vaccine (Season Ended) 09/01/2019    Lipid screen  05/04/2023    HIV screen  Completed

## 2019-05-03 NOTE — PROGRESS NOTES
[facility]  [facility] [address] [facility]  [address]  Dept: [phone]      Today's Date: 5/3/2019  Patient Name: Bobbi Joseph  Patient's age: 47 y. o., 1965        CHIEF COMPLAINT:    Chief Complaint   Patient presents with    3 Month Follow-Up    Hypertension       History Obtained From:  patient    HISTORY OF PRESENT ILLNESS:      The patient is a 47 y.o. old  male and is here to follow-up for hypertension, COPD. His blood pressure is controlled with metoprolol 50 mg daily, Norvasc 2.5 mg, lisinopril 30 mg daily. His heart rate is 56. Denies any dizziness. We will decrease the dose of metoprolol to 25 mg twice a day. We will stop Norvasc 2.5 and increase lisinopril to 40 mg daily. His COPD is stable with Spiriva and albuterol. He uses Flonase and Zyrtec for allergic sinusitis. He needs a refill. Patient Active Problem List   Diagnosis    Severe bipolar I disorder, current or most recent episode depressed ([facility] [facility]    HTN (hypertension)    Irritable bowel syndrome    GERD (gastroesophageal reflux disease)    Chronic back pain    Chronic diarrhea    Opioid withdrawal ([facility] [address]       Past Medical History:   has a past medical history of Bipolar disorder ([facility] [address] and Hypertension. Past Surgical History:   has no past surgical history on file.      Medications:    Current Outpatient Medications   Medication Sig Dispense Refill    Buprenorphine HCl-Naloxone HCl (SUBOXONE SL) Take 8 mg by mouth       metoprolol tartrate (LOPRESSOR) 50 MG tablet take 1 tablet by mouth twice a day 60 tablet 0    omeprazole (PRILOSEC) 20 MG delayed release capsule Take 1 capsule by mouth daily (with breakfast) 30 capsule 3    ondansetron (ZOFRAN) 4 MG tablet Take 1 tablet by mouth daily as needed for Nausea or Vomiting 30 tablet 0    amLODIPine (NORVASC) 2.5 MG tablet Take 1 tablet by mouth daily 30 tablet 2    lisinopril (PRINIVIL;ZESTRIL) 30 MG tablet Take 1 tablet by mouth daily 30 tablet 2    atorvastatin (LIPITOR) 40 MG tablet Take 1 tablet by mouth daily 30 tablet 2    VENTOLIN  (90 Base) MCG/ACT inhaler Inhale 2 puffs into the lungs every 6 hours as needed for Wheezing 1 Inhaler 3    tiotropium (SPIRIVA HANDIHALER) 18 MCG inhalation capsule Inhale 1 capsule into the lungs daily 30 capsule 3    fluticasone (FLONASE) 50 MCG/ACT nasal spray 1 spray by Nasal route daily 1 Bottle 0    gabapentin (NEURONTIN) 800 MG tablet Take 1 tablet by mouth three times daily. Garrel Ron 0    gabapentin (NEURONTIN) 300 MG capsule Take 1 capsule by mouth nightly. Garrel Ron 0    TRINTELLIX 10 MG TABS tablet Take 3 tablets by mouth daily  0    cetirizine (ZYRTEC) 10 MG tablet Take 1 tablet by mouth daily 30 tablet 2    buPROPion (WELLBUTRIN XL) 300 MG extended release tablet Take 300 mg by mouth every morning      OXcarbazepine (TRILEPTAL) 600 MG tablet Take 600 mg by mouth 2 times daily      vortioxetine (BRINTELLIX) 5 MG TABS tablet Take 3 tablets by mouth daily. 15 tablet 0     No current facility-administered medications for this visit. Allergies:  Skelaxin [metaxalone]    Social History:   reports that he has been smoking pipe and cigarettes. He started smoking about 16 years ago. He has a 33.00 pack-year smoking history. He has never used smokeless tobacco. He reports that he drinks alcohol. He reports that he has current or past drug history. Drug: Cocaine. Family History: family history includes Alcohol Abuse in his mother; Bipolar Disorder in his mother; Coronary Art Dis in his brother; Hypertension in his father and another family member; Seizures in an other family member; Stroke in his father. REVIEW OF SYSTEMS:      Constitutional: Negative for fever and fatigue. HENT: Negative for congestion and sore throat. Eyes: Negative for eye pain and visual disturbance. Respiratory: Negative for chest tightness and shortness of breath.     Cardiovascular: Negative for chest pain and orthopnea . Gastrointestinal: Negative for vomiting, abdominal pain, constipation and diarrhea. Endocrine: Negative for cold intolerance, heat intolerance, polydipsia and polyuria. Genitourinary: Negative for dysuria and frequency. Musculoskeletal: Negative for  Myalgia, back pain, bone pain and arthralgias. Neurological: Negative for dizziness, weakness and headaches. PHYSICAL EXAM:        /82 (Site: Left Upper Arm, Position: Sitting, Cuff Size: Large Adult)   Pulse 56   Wt 235 lb (106.6 kg)   BMI 34.70 kg/m²      General appearance - well appearing, not in  distress. Mental status - alert and cooperative . Head: Normocephalic, without obvious abnormality, atraumatic. Eye: PERRL, conjunctiva/corneas clear, EOM's intact. Neck - Supple, no significant adenopathy . Chest - Clear to auscultation, no wheezes, rales or rhonchi, symmetric air entry. Heart -  regular rhythm, normal S1, S2, no murmurs. Abdomen - Soft, nontender, nondistended, no masses or organomegaly. Neurological - Alert, oriented, normal speech, no focal findings or movement disorder noted. .   Extremities -  No pedal edema, no clubbing or cyanosis. Labs:       Chemistry        Component Value Date/Time     (L) 05/04/2018 1146    K 4.3 05/04/2018 1146    CL 87 (L) 05/04/2018 1146    CO2 21 05/04/2018 1146    BUN 14 05/04/2018 1146    CREATININE 0.77 05/04/2018 1146        Component Value Date/Time    CALCIUM 8.9 05/04/2018 1146    ALKPHOS 82 05/04/2018 1146    AST 20 05/04/2018 1146    ALT 19 05/04/2018 1146    BILITOT 0.32 05/04/2018 1146          No results for input(s): GLUCOSE in the last 72 hours.   Lab Results   Component Value Date    WBC 13.5 (H) 05/04/2018    HGB 14.7 05/04/2018    HCT 41.0 05/04/2018    MCV 83.0 05/04/2018     05/04/2018     Lab Results   Component Value Date    TSH 0.97 05/04/2018     Lab Results   Component Value Date    LABA1C 5.7 05/04/2018 Lab Results   Component Value Date    LABMICR 55 (H) 05/04/2018     No components found for: CHLPL  Lab Results   Component Value Date    TRIG 89 05/04/2018     Lab Results   Component Value Date    HDL 39 (L) 05/04/2018     Lab Results   Component Value Date    LDLCHOLESTEROL 162 (H) 05/04/2018     The 10-year ASCVD risk score (Gianni Burton et al., 2013) is: 22.2%    Values used to calculate the score:      Age: 47 years      Sex: Male      Is Non- : No      Diabetic: No      Tobacco smoker: Yes      Systolic Blood Pressure: 275 mmHg      Is BP treated: Yes      HDL Cholesterol: 39 mg/dL      Total Cholesterol: 219 mg/dL    Health Maintenance Due   Topic Date Due    Pneumococcal 0-64 years Vaccine (1 of 1 - PPSV23) 12/17/1972    DTaP/Tdap/Td vaccine (1 - Tdap) 12/17/1985    Shingles Vaccine (1 of 2) 12/17/2016    A1C test (Diabetic or Prediabetic)  05/04/2019    Potassium monitoring  05/04/2019    Creatinine monitoring  05/04/2019        ASSESSMENT AND PLAN    1. Essential hypertension    - metoprolol tartrate (LOPRESSOR) 25 MG tablet; Take 1 tablet by mouth 2 times daily  Dispense: 60 tablet; Refill: 5  - lisinopril (PRINIVIL;ZESTRIL) 40 MG tablet; Take 1 tablet by mouth daily  Dispense: 90 tablet; Refill: 1  - CBC Auto Differential; Future  - Comprehensive Metabolic Panel; Future    2. Prediabetes    - Hemoglobin A1C; Future    3. Gastroesophageal reflux disease without esophagitis    - omeprazole (PRILOSEC) 20 MG delayed release capsule; Take 1 capsule by mouth daily (with breakfast)  Dispense: 30 capsule; Refill: 3    4. Dyslipidemia    - Lipid Panel; Future  - atorvastatin (LIPITOR) 40 MG tablet; Take 1 tablet by mouth daily  Dispense: 30 tablet; Refill: 2    5. Smoking      6. Chronic bronchitis, unspecified chronic bronchitis type (HCC)    - VENTOLIN  (90 Base) MCG/ACT inhaler;  Inhale 2 puffs into the lungs every 6 hours as needed for Wheezing  Dispense: 1 Inhaler; Refill: 3  - tiotropium (SPIRIVA HANDIHALER) 18 MCG inhalation capsule; Inhale 1 capsule into the lungs daily  Dispense: 30 capsule; Refill: 3    7. Seasonal allergic rhinitis, unspecified trigger    - fluticasone (FLONASE) 50 MCG/ACT nasal spray; 1 spray by Nasal route daily  Dispense: 1 Bottle; Refill: 0  - cetirizine (ZYRTEC) 10 MG tablet; Take 1 tablet by mouth daily  Dispense: 30 tablet; Refill: 2      · Venecia Bold received counseling on the following healthy behaviors: exercise, medication adherence and tobacco cessation  · Discussed use, benefit, and side effects of prescribed medications. Barriers to medication compliance addressed. All patient questions answered. Pt voiced understanding. · The return visit should be in 2 months      Barbie Mcguire MD  Attending and Faculty Internal Medicine  [address] [place] [address]  [address] [place] [address]  Dept: [phone]  5/3/19      This note is created with the assistance of a speech-recognition program. While intending to generate a document that actually reflects the content of the visit, the document can still have some mistakes which may not have been identified and corrected by editing.

## 2019-05-29 ENCOUNTER — TELEPHONE (OUTPATIENT)
Dept: INTERNAL MEDICINE | Age: 54
End: 2019-05-29

## 2019-05-29 NOTE — TELEPHONE ENCOUNTER
Tried to contact patient reguarding his appointment. I left a message on the pt's voicemail letting him know we had to cancel his appointment and to call office to reschedule.

## 2019-05-30 DIAGNOSIS — J30.2 SEASONAL ALLERGIC RHINITIS, UNSPECIFIED TRIGGER: ICD-10-CM

## 2019-05-30 NOTE — TELEPHONE ENCOUNTER
Request for Flonase. Next Visit Date:  Future Appointments   Date Time Provider Sudarshan Tam   6/7/2019 11:10 AM George Mir MD [address] Maintenance   Topic Date Due    Pneumococcal 0-64 years Vaccine (1 of 1 - PPSV23) 12/17/1972    DTaP/Tdap/Td vaccine (1 - Tdap) 12/17/1985    Shingles Vaccine (1 of 2) 12/17/2016    A1C test (Diabetic or Prediabetic)  05/04/2019    Potassium monitoring  05/04/2019    Creatinine monitoring  05/04/2019    Colon Cancer Screen FIT/FOBT  06/19/2019    Flu vaccine (Season Ended) 09/01/2019    Lipid screen  05/04/2023    HIV screen  Completed       Hemoglobin A1C (%)   Date Value   05/04/2018 5.7             ( goal A1C is < 7)   Microalb/Crt.  Ratio (mcg/mg creat)   Date Value   05/04/2018 55 (H)     LDL Cholesterol (mg/dL)   Date Value   05/04/2018 162 (H)       (goal LDL is <100)   AST (U/L)   Date Value   05/04/2018 20     ALT (U/L)   Date Value   05/04/2018 19     BUN (mg/dL)   Date Value   05/04/2018 14     BP Readings from Last 3 Encounters:   05/03/19 136/82   08/15/18 127/81   07/03/18 (!) 168/94          (goal 120/80)    All Future Testing planned in [facility]  Lab Frequency Next Occurrence   Sodium Once 05/30/2019   Osmolality Once 30/14/6758   Basic Metabolic Panel Once 66/73/6291   Comprehensive Metabolic Panel Once 80/79/1409   CBC Auto Differential Once 05/30/2019   CBC Auto Differential Once 09/30/2019   Comprehensive Metabolic Panel Once 92/05/4391   Lipid Panel Once 09/30/2019   Hemoglobin A1C Once 08/01/2019         Patient Active Problem List:     Severe bipolar I disorder, current or most recent episode depressed (HCC)     HTN (hypertension)     Irritable bowel syndrome     GERD (gastroesophageal reflux disease)     Chronic back pain     Chronic diarrhea     Opioid withdrawal ([facility] [address]

## 2019-06-03 RX ORDER — FLUTICASONE PROPIONATE 50 MCG
SPRAY, SUSPENSION (ML) NASAL
Qty: 16 G | Refills: 0 | Status: SHIPPED | OUTPATIENT
Start: 2019-06-03 | End: 2019-12-23 | Stop reason: SDUPTHER

## 2019-06-10 ENCOUNTER — OFFICE VISIT (OUTPATIENT)
Dept: INTERNAL MEDICINE | Age: 54
End: 2019-06-10
Payer: MEDICARE

## 2019-06-10 VITALS
WEIGHT: 245 LBS | DIASTOLIC BLOOD PRESSURE: 90 MMHG | BODY MASS INDEX: 34.3 KG/M2 | SYSTOLIC BLOOD PRESSURE: 160 MMHG | HEART RATE: 52 BPM | HEIGHT: 71 IN

## 2019-06-10 DIAGNOSIS — E78.5 DYSLIPIDEMIA: ICD-10-CM

## 2019-06-10 DIAGNOSIS — K21.9 GASTROESOPHAGEAL REFLUX DISEASE WITHOUT ESOPHAGITIS: ICD-10-CM

## 2019-06-10 DIAGNOSIS — J42 CHRONIC BRONCHITIS, UNSPECIFIED CHRONIC BRONCHITIS TYPE (HCC): ICD-10-CM

## 2019-06-10 DIAGNOSIS — I10 ESSENTIAL HYPERTENSION: Primary | ICD-10-CM

## 2019-06-10 PROCEDURE — 3017F COLORECTAL CA SCREEN DOC REV: CPT | Performed by: INTERNAL MEDICINE

## 2019-06-10 PROCEDURE — G8417 CALC BMI ABV UP PARAM F/U: HCPCS | Performed by: INTERNAL MEDICINE

## 2019-06-10 PROCEDURE — G8926 SPIRO NO PERF OR DOC: HCPCS | Performed by: INTERNAL MEDICINE

## 2019-06-10 PROCEDURE — 99211 OFF/OP EST MAY X REQ PHY/QHP: CPT | Performed by: INTERNAL MEDICINE

## 2019-06-10 PROCEDURE — 4004F PT TOBACCO SCREEN RCVD TLK: CPT | Performed by: INTERNAL MEDICINE

## 2019-06-10 PROCEDURE — 3023F SPIROM DOC REV: CPT | Performed by: INTERNAL MEDICINE

## 2019-06-10 PROCEDURE — 99213 OFFICE O/P EST LOW 20 MIN: CPT | Performed by: INTERNAL MEDICINE

## 2019-06-10 PROCEDURE — G8427 DOCREV CUR MEDS BY ELIG CLIN: HCPCS | Performed by: INTERNAL MEDICINE

## 2019-06-10 RX ORDER — LISINOPRIL 40 MG/1
40 TABLET ORAL DAILY
Qty: 90 TABLET | Refills: 1 | Status: SHIPPED | OUTPATIENT
Start: 2019-06-10 | End: 2019-08-28 | Stop reason: SDUPTHER

## 2019-06-10 RX ORDER — AMLODIPINE BESYLATE 5 MG/1
5 TABLET ORAL DAILY
Qty: 30 TABLET | Refills: 3 | Status: SHIPPED | OUTPATIENT
Start: 2019-06-10 | End: 2019-12-16 | Stop reason: SDUPTHER

## 2019-06-10 RX ORDER — ATORVASTATIN CALCIUM 40 MG/1
40 TABLET, FILM COATED ORAL DAILY
Qty: 30 TABLET | Refills: 2 | Status: SHIPPED | OUTPATIENT
Start: 2019-06-10 | End: 2019-08-28 | Stop reason: SDUPTHER

## 2019-06-10 RX ORDER — OMEPRAZOLE 20 MG/1
20 CAPSULE, DELAYED RELEASE ORAL
Qty: 30 CAPSULE | Refills: 3 | Status: SHIPPED | OUTPATIENT
Start: 2019-06-10 | End: 2020-02-26 | Stop reason: SDUPTHER

## 2019-06-10 NOTE — PROGRESS NOTES
[facility]  [facility] [address] [facility]  [address]  Dept: [phone]      Today's Date: 6/10/2019  Patient Name: Zonia Hedrick  Patient's age: 47 y. o., 1965        CHIEF COMPLAINT:    Chief Complaint   Patient presents with    Hypertension       History Obtained From:  patient    HISTORY OF PRESENT ILLNESS:      The patient is a 47 y.o. old  male and is here to follow-up for hypertension. His blood pressure is elevated. He is on lisinopril 40, metoprolol 25 mg daily. He is tolerating his medication well. Denies any headache, chest pain or palpitation. Patient Active Problem List   Diagnosis    Severe bipolar I disorder, current or most recent episode depressed ([facility] [address]    HTN (hypertension)    Irritable bowel syndrome    GERD (gastroesophageal reflux disease)    Chronic back pain    Chronic diarrhea    Opioid withdrawal ([facility] [address]       Past Medical History:   has a past medical history of B[facility] disorder [facility] and Hypertension. Past Surgical History:   has no past surgical history on file.      Medications:    Current Outpatient Medications   Medication Sig Dispense Refill    fluticasone (FLONASE) 50 MCG/ACT nasal spray instill 1 spray into each nostril once daily 16 g 0    Buprenorphine HCl-Naloxone HCl (SUBOXONE SL) Take 8 mg by mouth       metoprolol tartrate (LOPRESSOR) 25 MG tablet Take 1 tablet by mouth 2 times daily 60 tablet 5    lisinopril (PRINIVIL;ZESTRIL) 40 MG tablet Take 1 tablet by mouth daily 90 tablet 1    omeprazole (PRILOSEC) 20 MG delayed release capsule Take 1 capsule by mouth daily (with breakfast) 30 capsule 3    atorvastatin (LIPITOR) 40 MG tablet Take 1 tablet by mouth daily 30 tablet 2    VENTOLIN  (90 Base) MCG/ACT inhaler Inhale 2 puffs into the lungs every 6 hours as needed for Wheezing 1 Inhaler 3    tiotropium (SPIRIVA HANDIHALER) 18 MCG inhalation capsule Inhale 1 capsule into the lungs daily 30 capsule 3    cetirizine (ZYRTEC) 10 MG tablet Take 1 tablet by mouth daily 30 tablet 2    gabapentin (NEURONTIN) 800 MG tablet Take 1 tablet by mouth three times daily. [place] 0    gabapentin (NEURONTIN) 300 MG capsule Take 1 capsule by mouth nightly. [place] 0    TRINTELLIX 10 MG TABS tablet Take 3 tablets by mouth daily  0    OXcarbazepine (TRILEPTAL) 600 MG tablet Take 600 mg by mouth 2 times daily      ondansetron (ZOFRAN) 4 MG tablet Take 1 tablet by mouth daily as needed for Nausea or Vomiting 30 tablet 0    buPROPion (WELLBUTRIN XL) 300 MG extended release tablet Take 300 mg by mouth every morning      vortioxetine (BRINTELLIX) 5 MG TABS tablet Take 3 tablets by mouth daily. 15 tablet 0     No current facility-administered medications for this visit. Allergies:  Skelaxin [metaxalone]    Social History:   reports that he has been smoking pipe and cigarettes. He started smoking about 16 years ago. He has a 33.00 pack-year smoking history. He has never used smokeless tobacco. He reports that he drank alcohol. He reports that he has current or past drug history. Drug: Cocaine. Family History: family history includes Alcohol Abuse in his mother; Bipolar Disorder in his mother; Coronary Art Dis in his brother; Hypertension in his father and another family member; Seizures in an other family member; Stroke in his father. REVIEW OF SYSTEMS:      Constitutional: Negative for fever and fatigue. HENT: Negative for congestion and sore throat. Eyes: Negative for eye pain and visual disturbance. Respiratory: Negative for chest tightness and shortness of breath. Cardiovascular: Negative for chest pain and orthopnea . Gastrointestinal: Negative for vomiting, abdominal pain, constipation and diarrhea. Endocrine: Negative for cold intolerance, heat intolerance, polydipsia and polyuria. Genitourinary: Negative for dysuria and frequency.    Musculoskeletal: Negative for  Myalgia, back pain, bone pain and arthralgias. Neurological: Negative for dizziness, weakness and headaches. PHYSICAL EXAM:        BP (!) 160/90 (Site: Left Upper Arm, Position: Sitting, Cuff Size: Large Adult)   Pulse 52   Ht 5' 11\" (1.803 m)   Wt 245 lb (111.1 kg)   BMI 34.17 kg/m²      General appearance - well appearing, not in  distress. Mental status - alert and cooperative . Head: Normocephalic, without obvious abnormality, atraumatic. Eye: PERRL, conjunctiva/corneas clear, EOM's intact. Neck - Supple, no significant adenopathy . Chest - Clear to auscultation, no wheezes, rales or rhonchi, symmetric air entry. Heart -  regular rhythm, normal S1, S2, no murmurs. Abdomen - Soft, nontender, nondistended, no masses or organomegaly. Neurological - Alert, oriented, normal speech, no focal findings or movement disorder noted. Extremities -  No pedal edema, no clubbing or cyanosis. Labs:       Chemistry        Component Value Date/Time     (L) 05/04/2018 1146    K 4.3 05/04/2018 1146    CL 87 (L) 05/04/2018 1146    CO2 21 05/04/2018 1146    BUN 14 05/04/2018 1146    CREATININE 0.77 05/04/2018 1146        Component Value Date/Time    CALCIUM 8.9 05/04/2018 1146    ALKPHOS 82 05/04/2018 1146    AST 20 05/04/2018 1146    ALT 19 05/04/2018 1146    BILITOT 0.32 05/04/2018 1146          No results for input(s): GLUCOSE in the last 72 hours.   Lab Results   Component Value Date    WBC 13.5 (H) 05/04/2018    HGB 14.7 05/04/2018    HCT 41.0 05/04/2018    MCV 83.0 05/04/2018     05/04/2018     Lab Results   Component Value Date    TSH 0.97 05/04/2018     Lab Results   Component Value Date    LABA1C 5.7 05/04/2018     Lab Results   Component Value Date    LABMICR 55 (H) 05/04/2018     No components found for: CHLPL  Lab Results   Component Value Date    TRIG 89 05/04/2018     Lab Results   Component Value Date    HDL 39 (L) 05/04/2018     Lab Results   Component Value Date    LDLCHOLESTEROL 162 (H) 05/04/2018     The 10-year ASCVD risk score (Bhavin Keen, et al., 2013) is: 22.9%    Values used to calculate the score:      Age: 47 years      Sex: Male      Is Non- : No      Diabetic: No      Tobacco smoker: Yes      Systolic Blood Pressure: 641 mmHg      Is BP treated: Yes      HDL Cholesterol: 39 mg/dL      Total Cholesterol: 219 mg/dL    Health Maintenance Due   Topic Date Due    Pneumococcal 0-64 years Vaccine (1 of 1 - PPSV23) 12/17/1972    DTaP/Tdap/Td vaccine (1 - Tdap) 12/17/1985    Shingles Vaccine (1 of 2) 12/17/2016    A1C test (Diabetic or Prediabetic)  05/04/2019    Potassium monitoring  05/04/2019    Creatinine monitoring  05/04/2019    Colon Cancer Screen FIT/FOBT  06/19/2019        ASSESSMENT AND PLAN    1. Essential hypertension    - amLODIPine (NORVASC) 5 MG tablet; Take 1 tablet by mouth daily  Dispense: 30 tablet; Refill: 3  - metoprolol tartrate (LOPRESSOR) 25 MG tablet; Take 1 tablet by mouth 2 times daily  Dispense: 60 tablet; Refill: 5  - lisinopril (PRINIVIL;ZESTRIL) 40 MG tablet; Take 1 tablet by mouth daily  Dispense: 90 tablet; Refill: 1    2. Gastroesophageal reflux disease without esophagitis    - omeprazole (PRILOSEC) 20 MG delayed release capsule; Take 1 capsule by mouth daily (with breakfast)  Dispense: 30 capsule; Refill: 3    3. Dyslipidemia    - atorvastatin (LIPITOR) 40 MG tablet; Take 1 tablet by mouth daily  Dispense: 30 tablet; Refill: 2    4. Chronic bronchitis, unspecified chronic bronchitis type (HCC)    - VENTOLIN  (90 Base) MCG/ACT inhaler; Inhale 2 puffs into the lungs every 6 hours as needed for Wheezing  Dispense: 1 Inhaler; Refill: 3  - tiotropium (SPIRIVA HANDIHALER) 18 MCG inhalation capsule; Inhale 1 capsule into the lungs daily  Dispense: 30 capsule;  Refill: 3      · Start Norvasc 5 , continue lisinopril 40 and metoprolol 25   · Refill meds   · Labs as ordered in last visit   · Aury Limon received counseling on the following healthy behaviors: exercise and medication adherence  · Discussed use, benefit, and side effects of prescribed medications. Barriers to medication compliance addressed. All patient questions answered. Pt voiced understanding. · The return visit should be in 3 months      Mariel Mcintyre MD  Attending and Faculty Internal Medicine  [address] [place] [address]  [address]  Dept: [phone]  6/10/19      This note is created with the assistance of a speech-recognition program. While intending to generate a document that actually reflects the content of the visit, the document can still have some mistakes which may not have been identified and corrected by editing.

## 2019-06-10 NOTE — PROGRESS NOTES
DIABETES and HYPERTENSION visit    BP Readings from Last 3 Encounters:   05/03/19 136/82   08/15/18 127/81   07/03/18 (!) 168/94        Hemoglobin A1C (%)   Date Value   05/04/2018 5.7     Microalb/Crt. Ratio (mcg/mg creat)   Date Value   05/04/2018 55 (H)     LDL Cholesterol (mg/dL)   Date Value   05/04/2018 162 (H)     HDL (mg/dL)   Date Value   05/04/2018 39 (L)     BUN (mg/dL)   Date Value   05/04/2018 14     CREATININE (mg/dL)   Date Value   05/04/2018 0.77     Glucose (mg/dL)   Date Value   05/04/2018 99            Have you changed or started any medications since your last visit including any over-the-counter medicines, vitamins, or herbal medicines? no   Have you stopped taking any of your medications? Is so, why? -  no  Are you having any side effects from any of your medications? - no    Have you seen any other physician or provider since your last visit?  no   Have you had any other diagnostic tests since your last visit? Yes, labs  Have you been seen in the emergency room and/or had an admission in a hospital since we last saw you?  no   Have you had your routine dental cleaning in the past 6 months?  no     Have you had your annual diabetic retinal (eye) exam? No   (ensure copy of exam is in the chart)    Do you have an active MyChart account? If no, what is the barrier?   Yes    Patient Care Team:  Mariel Mcintyre MD as PCP - General (Internal Medicine)  Mariel Mcintyre MD as PCP - [facility]    Medical History Review  Past Medical, Family, and Social History reviewed and does not contribute to the patient presenting condition    Health Maintenance   Topic Date Due    Pneumococcal 0-64 years Vaccine (1 of 1 - PPSV23) 12/17/1972    DTaP/Tdap/Td vaccine (1 - Tdap) 12/17/1985    Shingles Vaccine (1 of 2) 12/17/2016    A1C test (Diabetic or Prediabetic)  05/04/2019    Potassium monitoring  05/04/2019    Creatinine monitoring  05/04/2019    Colon Cancer Screen FIT/FOBT  06/19/2019  Flu vaccine (Season Ended) 09/01/2019    Lipid screen  05/04/2023    HIV screen  Completed

## 2019-07-22 DIAGNOSIS — J30.2 SEASONAL ALLERGIC RHINITIS, UNSPECIFIED TRIGGER: ICD-10-CM

## 2019-07-22 RX ORDER — CETIRIZINE HYDROCHLORIDE 10 MG/1
10 TABLET ORAL DAILY
Qty: 30 TABLET | Refills: 2 | Status: SHIPPED | OUTPATIENT
Start: 2019-07-22 | End: 2019-10-23 | Stop reason: SDUPTHER

## 2019-08-28 DIAGNOSIS — E78.5 DYSLIPIDEMIA: ICD-10-CM

## 2019-08-28 DIAGNOSIS — I10 ESSENTIAL HYPERTENSION: ICD-10-CM

## 2019-08-28 RX ORDER — LISINOPRIL 40 MG/1
40 TABLET ORAL DAILY
Qty: 90 TABLET | Refills: 1 | Status: SHIPPED | OUTPATIENT
Start: 2019-08-28 | End: 2020-05-12 | Stop reason: SDUPTHER

## 2019-08-28 RX ORDER — ATORVASTATIN CALCIUM 40 MG/1
40 TABLET, FILM COATED ORAL DAILY
Qty: 30 TABLET | Refills: 2 | Status: SHIPPED | OUTPATIENT
Start: 2019-08-28 | End: 2020-02-14 | Stop reason: SDUPTHER

## 2019-08-28 RX ORDER — AMLODIPINE BESYLATE 5 MG/1
5 TABLET ORAL DAILY
Qty: 30 TABLET | Refills: 3 | Status: CANCELLED | OUTPATIENT
Start: 2019-08-28

## 2019-08-28 NOTE — TELEPHONE ENCOUNTER
Request for 2 medication refills. Next Visit Date:  Future Appointments   Date Time Provider Sudarshan Rodriguezi   9/20/2019  1:50 PM Tavia Paul MD [facility] [address] Maintenance   Topic Date Due    Pneumococcal 0-64 years Vaccine (1 of 1 - PPSV23) 12/17/1972    DTaP/Tdap/Td vaccine (1 - Tdap) 12/17/1985    Shingles Vaccine (1 of 2) 12/17/2016    A1C test (Diabetic or Prediabetic)  05/04/2019    Potassium monitoring  05/04/2019    Creatinine monitoring  05/04/2019    Colon Cancer Screen FIT/FOBT  06/19/2019    Flu vaccine (1) 09/01/2019    Lipid screen  05/04/2023    HIV screen  Completed       Hemoglobin A1C (%)   Date Value   05/04/2018 5.7             ( goal A1C is < 7)   Microalb/Crt.  Ratio (mcg/mg creat)   Date Value   05/04/2018 55 (H)     LDL Cholesterol (mg/dL)   Date Value   05/04/2018 162 (H)       (goal LDL is <100)   AST (U/L)   Date Value   05/04/2018 20     ALT (U/L)   Date Value   05/04/2018 19     BUN (mg/dL)   Date Value   05/04/2018 14     BP Readings from Last 3 Encounters:   06/10/19 (!) 160/90   05/03/19 136/82   08/15/18 127/81          (goal 120/80)    All Future Testing planned in [facility]  Lab Frequency Next Occurrence   Osmolality Once 08/31/2019   CBC Auto Differential Once 09/30/2019   Comprehensive Metabolic Panel Once 46/70/4078   Lipid Panel Once 09/30/2019   Hemoglobin A1C Once 10/09/2019         Patient Active Problem List:     Severe bipolar I disorder, current or most recent episode depressed (HCC)     HTN (hypertension)     Irritable bowel syndrome     GERD (gastroesophageal reflux disease)     Chronic back pain     Chronic diarrhea     Opioid withdrawal ([facility] [address]

## 2019-10-23 DIAGNOSIS — J30.2 SEASONAL ALLERGIC RHINITIS, UNSPECIFIED TRIGGER: ICD-10-CM

## 2019-10-25 RX ORDER — CETIRIZINE HYDROCHLORIDE 10 MG/1
TABLET ORAL
Qty: 30 TABLET | Refills: 2 | Status: SHIPPED | OUTPATIENT
Start: 2019-10-25 | End: 2020-05-01 | Stop reason: SDUPTHER

## 2019-11-22 ENCOUNTER — OFFICE VISIT (OUTPATIENT)
Dept: INTERNAL MEDICINE | Age: 54
End: 2019-11-22
Payer: MEDICARE

## 2019-11-22 ENCOUNTER — HOSPITAL ENCOUNTER (OUTPATIENT)
Age: 54
Setting detail: SPECIMEN
Discharge: HOME | End: 2019-11-22
Payer: MEDICARE

## 2019-11-22 VITALS
WEIGHT: 242 LBS | HEART RATE: 68 BPM | DIASTOLIC BLOOD PRESSURE: 85 MMHG | SYSTOLIC BLOOD PRESSURE: 143 MMHG | BODY MASS INDEX: 34.65 KG/M2 | HEIGHT: 70 IN

## 2019-11-22 DIAGNOSIS — Z12.11 COLON CANCER SCREENING: ICD-10-CM

## 2019-11-22 DIAGNOSIS — E66.9 OBESITY (BMI 30-39.9): ICD-10-CM

## 2019-11-22 DIAGNOSIS — J30.2 SEASONAL ALLERGIC RHINITIS, UNSPECIFIED TRIGGER: ICD-10-CM

## 2019-11-22 DIAGNOSIS — F17.200 SMOKING: ICD-10-CM

## 2019-11-22 DIAGNOSIS — E78.5 DYSLIPIDEMIA: ICD-10-CM

## 2019-11-22 DIAGNOSIS — R35.0 URINARY FREQUENCY: ICD-10-CM

## 2019-11-22 DIAGNOSIS — I10 ESSENTIAL HYPERTENSION: ICD-10-CM

## 2019-11-22 DIAGNOSIS — F31.4 SEVERE BIPOLAR I DISORDER, CURRENT OR MOST RECENT EPISODE DEPRESSED (HCC): ICD-10-CM

## 2019-11-22 DIAGNOSIS — R73.02 IGT (IMPAIRED GLUCOSE TOLERANCE): ICD-10-CM

## 2019-11-22 DIAGNOSIS — J42 CHRONIC BRONCHITIS, UNSPECIFIED CHRONIC BRONCHITIS TYPE (HCC): ICD-10-CM

## 2019-11-22 DIAGNOSIS — R06.81 APNEIC SPELL: ICD-10-CM

## 2019-11-22 DIAGNOSIS — E87.1 HYPONATREMIA: ICD-10-CM

## 2019-11-22 DIAGNOSIS — Z23 NEED FOR INFLUENZA VACCINATION: ICD-10-CM

## 2019-11-22 DIAGNOSIS — I10 ESSENTIAL HYPERTENSION: Primary | ICD-10-CM

## 2019-11-22 DIAGNOSIS — K21.9 GASTROESOPHAGEAL REFLUX DISEASE WITHOUT ESOPHAGITIS: ICD-10-CM

## 2019-11-22 LAB
-: ABNORMAL
AMORPHOUS: ABNORMAL
ANION GAP SERPL CALCULATED.3IONS-SCNC: 12 MMOL/L (ref 9–17)
BACTERIA: ABNORMAL
BILIRUBIN URINE: NEGATIVE
BUN BLDV-MCNC: 6 MG/DL (ref 6–20)
BUN/CREAT BLD: ABNORMAL (ref 9–20)
CALCIUM SERPL-MCNC: 9.2 MG/DL (ref 8.6–10.4)
CASTS UA: ABNORMAL /LPF (ref 0–8)
CHLORIDE BLD-SCNC: 84 MMOL/L (ref 98–107)
CO2: 26 MMOL/L (ref 20–31)
COLOR: YELLOW
CREAT SERPL-MCNC: 0.69 MG/DL (ref 0.7–1.2)
CRYSTALS, UA: ABNORMAL /HPF
EPITHELIAL CELLS UA: ABNORMAL /HPF (ref 0–5)
GFR AFRICAN AMERICAN: >60 ML/MIN
GFR NON-AFRICAN AMERICAN: >60 ML/MIN
GFR SERPL CREATININE-BSD FRML MDRD: ABNORMAL ML/MIN/{1.73_M2}
GFR SERPL CREATININE-BSD FRML MDRD: ABNORMAL ML/MIN/{1.73_M2}
GLUCOSE BLD-MCNC: 85 MG/DL (ref 70–99)
GLUCOSE URINE: NEGATIVE
HBA1C MFR BLD: 6 %
KETONES, URINE: NEGATIVE
LEUKOCYTE ESTERASE, URINE: NEGATIVE
MUCUS: ABNORMAL
NITRITE, URINE: NEGATIVE
OTHER OBSERVATIONS UA: ABNORMAL
PH UA: 6.5 (ref 5–8)
POTASSIUM SERPL-SCNC: 4.3 MMOL/L (ref 3.7–5.3)
PROTEIN UA: ABNORMAL
RBC UA: ABNORMAL /HPF (ref 0–4)
RENAL EPITHELIAL, UA: ABNORMAL /HPF
SODIUM BLD-SCNC: 122 MMOL/L (ref 135–144)
SPECIFIC GRAVITY UA: 1.02 (ref 1–1.03)
TRICHOMONAS: ABNORMAL
TURBIDITY: CLEAR
URINE HGB: NEGATIVE
UROBILINOGEN, URINE: NORMAL
WBC UA: ABNORMAL /HPF (ref 0–5)
YEAST: ABNORMAL

## 2019-11-22 PROCEDURE — G8417 CALC BMI ABV UP PARAM F/U: HCPCS | Performed by: STUDENT IN AN ORGANIZED HEALTH CARE EDUCATION/TRAINING PROGRAM

## 2019-11-22 PROCEDURE — 4004F PT TOBACCO SCREEN RCVD TLK: CPT | Performed by: STUDENT IN AN ORGANIZED HEALTH CARE EDUCATION/TRAINING PROGRAM

## 2019-11-22 PROCEDURE — 81001 URINALYSIS AUTO W/SCOPE: CPT

## 2019-11-22 PROCEDURE — 3017F COLORECTAL CA SCREEN DOC REV: CPT | Performed by: STUDENT IN AN ORGANIZED HEALTH CARE EDUCATION/TRAINING PROGRAM

## 2019-11-22 PROCEDURE — 83036 HEMOGLOBIN GLYCOSYLATED A1C: CPT | Performed by: STUDENT IN AN ORGANIZED HEALTH CARE EDUCATION/TRAINING PROGRAM

## 2019-11-22 PROCEDURE — 3023F SPIROM DOC REV: CPT | Performed by: STUDENT IN AN ORGANIZED HEALTH CARE EDUCATION/TRAINING PROGRAM

## 2019-11-22 PROCEDURE — 36415 COLL VENOUS BLD VENIPUNCTURE: CPT

## 2019-11-22 PROCEDURE — G8482 FLU IMMUNIZE ORDER/ADMIN: HCPCS | Performed by: STUDENT IN AN ORGANIZED HEALTH CARE EDUCATION/TRAINING PROGRAM

## 2019-11-22 PROCEDURE — G8427 DOCREV CUR MEDS BY ELIG CLIN: HCPCS | Performed by: STUDENT IN AN ORGANIZED HEALTH CARE EDUCATION/TRAINING PROGRAM

## 2019-11-22 PROCEDURE — 99214 OFFICE O/P EST MOD 30 MIN: CPT | Performed by: STUDENT IN AN ORGANIZED HEALTH CARE EDUCATION/TRAINING PROGRAM

## 2019-11-22 PROCEDURE — G8926 SPIRO NO PERF OR DOC: HCPCS | Performed by: STUDENT IN AN ORGANIZED HEALTH CARE EDUCATION/TRAINING PROGRAM

## 2019-11-22 PROCEDURE — 80048 BASIC METABOLIC PNL TOTAL CA: CPT

## 2019-11-22 PROCEDURE — 90688 IIV4 VACCINE SPLT 0.5 ML IM: CPT | Performed by: STUDENT IN AN ORGANIZED HEALTH CARE EDUCATION/TRAINING PROGRAM

## 2019-11-22 PROCEDURE — 87086 URINE CULTURE/COLONY COUNT: CPT

## 2019-11-22 RX ORDER — BUPRENORPHINE AND NALOXONE 8; 2 MG/1; MG/1
FILM, SOLUBLE BUCCAL; SUBLINGUAL
Status: ON HOLD | COMMUNITY
Start: 2019-11-14 | End: 2021-12-09 | Stop reason: HOSPADM

## 2019-11-24 LAB
CULTURE: NORMAL
Lab: NORMAL
SPECIMEN DESCRIPTION: NORMAL

## 2019-11-25 DIAGNOSIS — E87.1 CHRONIC HYPONATREMIA: Primary | ICD-10-CM

## 2019-12-16 DIAGNOSIS — I10 ESSENTIAL HYPERTENSION: ICD-10-CM

## 2019-12-16 RX ORDER — AMLODIPINE BESYLATE 5 MG/1
5 TABLET ORAL DAILY
Qty: 30 TABLET | Refills: 3 | Status: SHIPPED | OUTPATIENT
Start: 2019-12-16 | End: 2020-02-26 | Stop reason: SDUPTHER

## 2019-12-23 DIAGNOSIS — J30.2 SEASONAL ALLERGIC RHINITIS, UNSPECIFIED TRIGGER: ICD-10-CM

## 2019-12-24 RX ORDER — FLUTICASONE PROPIONATE 50 MCG
SPRAY, SUSPENSION (ML) NASAL
Qty: 16 G | Refills: 0 | Status: SHIPPED | OUTPATIENT
Start: 2019-12-24 | End: 2020-01-17

## 2020-01-17 RX ORDER — FLUTICASONE PROPIONATE 50 MCG
SPRAY, SUSPENSION (ML) NASAL
Qty: 16 G | Refills: 0 | Status: SHIPPED | OUTPATIENT
Start: 2020-01-17 | End: 2020-02-24

## 2020-01-17 NOTE — TELEPHONE ENCOUNTER
Request for flonase - medication pended. Please fill if appropriate. Next Visit Date:  Future Appointments   Date Time Provider Sudarshan Rodriguezi   2/28/2020  1:00 PM Sid Montanez MD [facility] [address] Maintenance   Topic Date Due    Pneumococcal 0-64 years Vaccine (1 of 1 - PPSV23) 12/17/1972    DTaP/Tdap/Td vaccine (1 - Tdap) 12/17/1977    Shingles Vaccine (1 of 2) 12/17/2016    Lipid screen  05/04/2019    Colon Cancer Screen FIT/FOBT  06/19/2019    A1C test (Diabetic or Prediabetic)  11/22/2020    Potassium monitoring  11/22/2020    Creatinine monitoring  11/22/2020    Flu vaccine  Completed    HIV screen  Completed       Hemoglobin A1C (%)   Date Value   11/22/2019 6.0   05/04/2018 5.7             ( goal A1C is < 7)   Microalb/Crt.  Ratio (mcg/mg creat)   Date Value   05/04/2018 55 (H)     LDL Cholesterol (mg/dL)   Date Value   05/04/2018 162 (H)       (goal LDL is <100)   AST (U/L)   Date Value   05/04/2018 20     ALT (U/L)   Date Value   05/04/2018 19     BUN (mg/dL)   Date Value   11/22/2019 6     BP Readings from Last 3 Encounters:   11/22/19 (!) 143/85   06/10/19 (!) 160/90   05/03/19 136/82          (goal 120/80)    All Future Testing planned in [facility]  Lab Frequency Next Occurrence   CBC Auto Differential Once 01/11/2020   Comprehensive Metabolic Panel Once 57/46/1015   Lipid Panel Once 01/11/2020   Hemoglobin A1C Once 01/18/2020   Cologuard Once 03/09/2020   Full PFT Study With Bronchodilator Once 11/22/2019   Baseline Diagnostic Sleep Study Once 11/22/2019   Sleep Study with PAP Titration Once 11/22/2019   Sodium, Urine, Random Once 03/03/2020   Osmolality, Urine Once 03/03/2020   Osmolality, Serum Once 03/03/2020   Sodium Once 03/03/2020         Patient Active Problem List:     Severe bipolar I disorder, current or most recent episode depressed ([address]     HTN (hypertension)     Irritable bowel syndrome     GERD (gastroesophageal reflux disease)     Chronic back pain     Chronic

## 2020-01-31 ENCOUNTER — TELEPHONE (OUTPATIENT)
Dept: INTERNAL MEDICINE | Age: 55
End: 2020-01-31

## 2020-02-05 NOTE — TELEPHONE ENCOUNTER
Pt returned call to the office. Pt was told that the office was following up on the cologuard. Pt states that he was not aware of having to do the test.  Writer explained to the pt how to do it and the pt stated that he will get it done.

## 2020-02-14 RX ORDER — ATORVASTATIN CALCIUM 40 MG/1
40 TABLET, FILM COATED ORAL DAILY
Qty: 30 TABLET | Refills: 2 | Status: SHIPPED | OUTPATIENT
Start: 2020-02-14 | End: 2020-04-29

## 2020-02-14 NOTE — TELEPHONE ENCOUNTER
Health Maintenance   Topic Date Due    Pneumococcal 0-64 years Vaccine (1 of 1 - PPSV23) 12/17/1972    DTaP/Tdap/Td vaccine (1 - Tdap) 12/17/1977    Shingles Vaccine (1 of 2) 12/17/2016    Lipid screen  05/04/2019    Colon Cancer Screen FIT/FOBT  06/19/2019    A1C test (Diabetic or Prediabetic)  11/22/2020    Potassium monitoring  11/22/2020    Creatinine monitoring  11/22/2020    Flu vaccine  Completed    HIV screen  Completed    Hepatitis A vaccine  Aged Out    Hepatitis B vaccine  Aged Out    Hib vaccine  Aged Out    Meningococcal (ACWY) vaccine  Aged Out             (applicable per patient's age: Cancer Screenings, Depression Screening, Fall Risk Screening, Immunizations)    Hemoglobin A1C (%)   Date Value   11/22/2019 6.0   05/04/2018 5.7     Microalb/Crt.  Ratio (mcg/mg creat)   Date Value   05/04/2018 55 (H)     LDL Cholesterol (mg/dL)   Date Value   05/04/2018 162 (H)     AST (U/L)   Date Value   05/04/2018 20     ALT (U/L)   Date Value   05/04/2018 19     BUN (mg/dL)   Date Value   11/22/2019 6      (goal A1C is < 7)   (goal LDL is <100) need 30-50% reduction from baseline     BP Readings from Last 3 Encounters:   11/22/19 (!) 143/85   06/10/19 (!) 160/90   05/03/19 136/82    (goal /80)      All Future Testing planned in [facility]:  Lab Frequency Next Occurrence   Cologuard Once 03/09/2020   Full PFT Study With Bronchodilator Once 11/22/2019   Baseline Diagnostic Sleep Study Once 11/22/2019   Sleep Study with PAP Titration Once 11/22/2019   Sodium, Urine, Random Once 03/03/2020   Osmolality, Urine Once 03/03/2020   Osmolality, Serum Once 03/03/2020   Sodium Once 03/03/2020       Next Visit Date:  Future Appointments   Date Time Provider Sudarshan Tam   2/28/2020  1:00 PM Bushra Cr MD [facility] [address]            Patient Active Problem List:     Severe bipolar I disorder, current or most recent episode depressed ([facility] [address]     HTN (hypertension)     Irritable bowel syndrome     GERD

## 2020-02-24 RX ORDER — FLUTICASONE PROPIONATE 50 MCG
SPRAY, SUSPENSION (ML) NASAL
Qty: 16 G | Refills: 0 | Status: SHIPPED | OUTPATIENT
Start: 2020-02-24 | End: 2020-03-27

## 2020-02-25 NOTE — TELEPHONE ENCOUNTER
Refill request for Omeprazole and Amlodipine. If appropriate please send medication(s) to patients pharmacy. Next appt: 02/28/2020      Health Maintenance   Topic Date Due    Pneumococcal 0-64 years Vaccine (1 of 1 - PPSV23) 12/17/1972    DTaP/Tdap/Td vaccine (1 - Tdap) 12/17/1977    Shingles Vaccine (1 of 2) 12/17/2016    Lipid screen  05/04/2019    Colon Cancer Screen FIT/FOBT  06/19/2019    A1C test (Diabetic or Prediabetic)  11/22/2020    Potassium monitoring  11/22/2020    Creatinine monitoring  11/22/2020    Flu vaccine  Completed    HIV screen  Completed    Hepatitis A vaccine  Aged Out    Hepatitis B vaccine  Aged Out    Hib vaccine  Aged Out    Meningococcal (ACWY) vaccine  Aged Out       Hemoglobin A1C (%)   Date Value   11/22/2019 6.0   05/04/2018 5.7             ( goal A1C is < 7)   Microalb/Crt.  Ratio (mcg/mg creat)   Date Value   05/04/2018 55 (H)     LDL Cholesterol (mg/dL)   Date Value   05/04/2018 162 (H)       (goal LDL is <100)   AST (U/L)   Date Value   05/04/2018 20     ALT (U/L)   Date Value   05/04/2018 19     BUN (mg/dL)   Date Value   11/22/2019 6     BP Readings from Last 3 Encounters:   11/22/19 (!) 143/85   06/10/19 (!) 160/90   05/03/19 136/82          (goal 120/80)          Patient Active Problem List:     Severe bipolar I disorder, current or most recent episode depressed (HCC)     HTN (hypertension)     Irritable bowel syndrome     GERD (gastroesophageal reflux disease)     Chronic back pain     Chronic diarrhea     Opioid withdrawal ([facility] [address]

## 2020-02-26 RX ORDER — OMEPRAZOLE 20 MG/1
20 CAPSULE, DELAYED RELEASE ORAL
Qty: 30 CAPSULE | Refills: 3 | Status: SHIPPED | OUTPATIENT
Start: 2020-02-26 | End: 2020-07-02

## 2020-02-26 RX ORDER — AMLODIPINE BESYLATE 5 MG/1
5 TABLET ORAL DAILY
Qty: 30 TABLET | Refills: 3 | Status: SHIPPED | OUTPATIENT
Start: 2020-02-26 | End: 2020-07-02

## 2020-03-27 RX ORDER — FLUTICASONE PROPIONATE 50 MCG
SPRAY, SUSPENSION (ML) NASAL
Qty: 16 G | Refills: 0 | Status: SHIPPED | OUTPATIENT
Start: 2020-03-27 | End: 2020-04-23

## 2020-03-27 NOTE — TELEPHONE ENCOUNTER
Request for flonase - medication pended. Please fill if appropriate. Next Visit Date:  No future appointments. Health Maintenance   Topic Date Due    Pneumococcal 0-64 years Vaccine (1 of 1 - PPSV23) 12/17/1972    DTaP/Tdap/Td vaccine (1 - Tdap) 12/17/1985    Shingles Vaccine (1 of 2) 12/17/2016    Lipid screen  05/04/2019    Colon Cancer Screen FIT/FOBT  06/19/2019    A1C test (Diabetic or Prediabetic)  11/22/2020    Potassium monitoring  11/22/2020    Creatinine monitoring  11/22/2020    Flu vaccine  Completed    HIV screen  Completed    Hepatitis A vaccine  Aged Out    Hepatitis B vaccine  Aged Out    Hib vaccine  Aged Out    Meningococcal (ACWY) vaccine  Aged Out       Hemoglobin A1C (%)   Date Value   11/22/2019 6.0   05/04/2018 5.7             ( goal A1C is < 7)   Microalb/Crt.  Ratio (mcg/mg creat)   Date Value   05/04/2018 55 (H)     LDL Cholesterol (mg/dL)   Date Value   05/04/2018 162 (H)       (goal LDL is <100)   AST (U/L)   Date Value   05/04/2018 20     ALT (U/L)   Date Value   05/04/2018 19     BUN (mg/dL)   Date Value   11/22/2019 6     BP Readings from Last 3 Encounters:   11/22/19 (!) 143/85   06/10/19 (!) 160/90   05/03/19 136/82          (goal 120/80)    All Future Testing planned in [facility]  Lab Frequency Next Occurrence   Cologuard Once 11/22/2020   Full PFT Study With Bronchodilator Once 11/22/2019   Baseline Diagnostic Sleep Study Once 11/22/2019   Sleep Study with PAP Titration Once 11/22/2019   Sodium, Urine, Random Once 06/12/2020   Osmolality, Urine Once 06/12/2020   Osmolality, Serum Once 06/12/2020   Sodium Once 06/12/2020         Patient Active Problem List:     Severe bipolar I disorder, current or most recent episode depressed ([address]     HTN (hypertension)     Irritable bowel syndrome     GERD (gastroesophageal reflux disease)     Chronic back pain     Chronic diarrhea     Opioid withdrawal ([address]

## 2020-04-23 RX ORDER — FLUTICASONE PROPIONATE 50 MCG
SPRAY, SUSPENSION (ML) NASAL
Qty: 16 G | Refills: 0 | Status: SHIPPED | OUTPATIENT
Start: 2020-04-23 | End: 2020-05-12 | Stop reason: SDUPTHER

## 2020-04-23 NOTE — TELEPHONE ENCOUNTER
Refill request for Flonase. If appropriate please send medication(s) to patients pharmacy. Next appt: 05/01/2020      Health Maintenance   Topic Date Due    Pneumococcal 0-64 years Vaccine (1 of 1 - PPSV23) 12/17/1972    DTaP/Tdap/Td vaccine (1 - Tdap) 12/17/1985    Shingles Vaccine (1 of 2) 12/17/2016    Lipid screen  05/04/2019    Colon Cancer Screen FIT/FOBT  06/19/2019    A1C test (Diabetic or Prediabetic)  11/22/2020    Potassium monitoring  11/22/2020    Creatinine monitoring  11/22/2020    Flu vaccine  Completed    HIV screen  Completed    Hepatitis A vaccine  Aged Out    Hepatitis B vaccine  Aged Out    Hib vaccine  Aged Out    Meningococcal (ACWY) vaccine  Aged Out       Hemoglobin A1C (%)   Date Value   11/22/2019 6.0   05/04/2018 5.7             ( goal A1C is < 7)   Microalb/Crt.  Ratio (mcg/mg creat)   Date Value   05/04/2018 55 (H)     LDL Cholesterol (mg/dL)   Date Value   05/04/2018 162 (H)       (goal LDL is <100)   AST (U/L)   Date Value   05/04/2018 20     ALT (U/L)   Date Value   05/04/2018 19     BUN (mg/dL)   Date Value   11/22/2019 6     BP Readings from Last 3 Encounters:   11/22/19 (!) 143/85   06/10/19 (!) 160/90   05/03/19 136/82          (goal 120/80)          Patient Active Problem List:     Severe bipolar I disorder, current or most recent episode depressed (HCC)     HTN (hypertension)     Irritable bowel syndrome     GERD (gastroesophageal reflux disease)     Chronic back pain     Chronic diarrhea     Opioid withdrawal ([facility] [address]

## 2020-04-29 RX ORDER — ATORVASTATIN CALCIUM 40 MG/1
TABLET, FILM COATED ORAL
Qty: 30 TABLET | Refills: 2 | Status: SHIPPED | OUTPATIENT
Start: 2020-04-29 | End: 2020-09-12

## 2020-05-01 ENCOUNTER — VIRTUAL VISIT (OUTPATIENT)
Dept: INTERNAL MEDICINE | Age: 55
End: 2020-05-01
Payer: MEDICARE

## 2020-05-01 VITALS — BODY MASS INDEX: 34.72 KG/M2 | HEIGHT: 70 IN

## 2020-05-01 PROCEDURE — 99213 OFFICE O/P EST LOW 20 MIN: CPT | Performed by: INTERNAL MEDICINE

## 2020-05-01 RX ORDER — CETIRIZINE HYDROCHLORIDE 10 MG/1
10 TABLET ORAL DAILY
Qty: 30 TABLET | Refills: 2 | Status: ON HOLD | OUTPATIENT
Start: 2020-05-01 | End: 2021-12-09 | Stop reason: SDUPTHER

## 2020-05-01 NOTE — PROGRESS NOTES
smoke     Diagnosis Orders   1. Essential hypertension  Comprehensive Metabolic Panel   2. Seasonal allergic rhinitis, unspecified trigger  cetirizine (ZYRTEC) 10 MG tablet   3. Dyslipidemia  Lipid Panel   4. Obesity (BMI 30-39. 9)  Comprehensive Metabolic Panel    TSH with Reflex   5. Prediabetes  Hemoglobin A1C   6. Hyponatremia  Comprehensive Metabolic Panel     Obtain labs  Visit in 2 weeks  I affirm this is a Patient Initiated Episode with a Patient who has not had a related appointment within my department in the past 7 days or scheduled within the next 24 hours.     Patient identification was verified at the start of the visit: Yes    Total Time: minutes: 11-20 minutes    Note: not billable if this call serves to triage the patient into an appointment for the relevant concern      Kevan Avila

## 2020-05-12 RX ORDER — LISINOPRIL 40 MG/1
40 TABLET ORAL DAILY
Qty: 90 TABLET | Refills: 1 | Status: SHIPPED | OUTPATIENT
Start: 2020-05-12 | End: 2020-11-12

## 2020-05-12 RX ORDER — LISINOPRIL 40 MG/1
40 TABLET ORAL DAILY
Qty: 90 TABLET | Refills: 1 | Status: CANCELLED | OUTPATIENT
Start: 2020-05-12

## 2020-05-12 NOTE — TELEPHONE ENCOUNTER
Next Visit Date:pt requesting medication refill. lisinopril  Future Appointments   Date Time Provider Sudarshan Tam   5/13/2020  9:20 AM Efrem Morales MD [address] [facility]   Topic Date Due    Pneumococcal 0-64 years Vaccine (1 of 1 - PPSV23) 12/17/1972    DTaP/Tdap/Td vaccine (1 - Tdap) 12/17/1985    Shingles Vaccine (1 of 2) 12/17/2016    Lipid screen  05/04/2019    Colon Cancer Screen FIT/FOBT  06/19/2019    A1C test (Diabetic or Prediabetic)  11/22/2020    Potassium monitoring  11/22/2020    Creatinine monitoring  11/22/2020    Flu vaccine  Completed    HIV screen  Completed    Hepatitis A vaccine  Aged Out    Hepatitis B vaccine  Aged Out    Hib vaccine  Aged Out    Meningococcal (ACWY) vaccine  Aged Out       Hemoglobin A1C (%)   Date Value   11/22/2019 6.0   05/04/2018 5.7             ( goal A1C is < 7)   Microalb/Crt.  Ratio (mcg/mg creat)   Date Value   05/04/2018 55 (H)     LDL Cholesterol (mg/dL)   Date Value   05/04/2018 162 (H)   07/17/2014 86       (goal LDL is <100)   AST (U/L)   Date Value   05/04/2018 20     ALT (U/L)   Date Value   05/04/2018 19     BUN (mg/dL)   Date Value   11/22/2019 6     BP Readings from Last 3 Encounters:   11/22/19 (!) 143/85   06/10/19 (!) 160/90   05/03/19 136/82          (goal 120/80)    All Future Testing planned in [facility]  Lab Frequency Next Occurrence   Cologuard Once 11/22/2020   Full PFT Study With Bronchodilator Once 11/22/2019   Baseline Diagnostic Sleep Study Once 11/22/2019   Sleep Study with PAP Titration Once 11/22/2019   Sodium, Urine, Random Once 06/12/2020   Osmolality, Urine Once 06/12/2020   Osmolality, Serum Once 06/12/2020   Sodium Once 06/12/2020   Comprehensive Metabolic Panel Once 57/74/6911   Lipid Panel Once 08/09/2020   TSH with Reflex Once 08/09/2020   Hemoglobin A1C Once 08/09/2020               Patient Active Problem List:     Severe bipolar I disorder, current or most recent episode depressed ([facility] HTN (hypertension)     Irritable bowel syndrome     GERD (gastroesophageal reflux disease)     Chronic back pain     Chronic diarrhea     [facility] ([facility]

## 2020-05-12 NOTE — TELEPHONE ENCOUNTER
Irritable bowel syndrome     GERD (gastroesophageal reflux disease)     Chronic back pain     Chronic diarrhea     Op[facility] ([facility]

## 2020-05-14 RX ORDER — TIOTROPIUM BROMIDE 18 UG/1
18 CAPSULE ORAL; RESPIRATORY (INHALATION) DAILY
Qty: 30 CAPSULE | Refills: 3 | Status: ON HOLD | OUTPATIENT
Start: 2020-05-14 | End: 2021-12-09 | Stop reason: HOSPADM

## 2020-05-14 RX ORDER — FLUTICASONE PROPIONATE 50 MCG
SPRAY, SUSPENSION (ML) NASAL
Qty: 16 G | Refills: 0 | Status: ON HOLD | OUTPATIENT
Start: 2020-05-14 | End: 2021-12-09 | Stop reason: SDUPTHER

## 2020-06-25 ENCOUNTER — TELEPHONE (OUTPATIENT)
Dept: INTERNAL MEDICINE | Age: 55
End: 2020-06-25

## 2020-06-26 NOTE — TELEPHONE ENCOUNTER
Alla request for pt requesting medication refill, amlodipine, omeprazole    Next Visit Date:  No future appointments. Health Maintenance   Topic Date Due    Pneumococcal 0-64 years Vaccine (1 of 1 - PPSV23) 12/17/1972    DTaP/Tdap/Td vaccine (1 - Tdap) 12/17/1985    Shingles Vaccine (1 of 2) 12/17/2016    Lipid screen  05/04/2019    Colon Cancer Screen FIT/FOBT  06/19/2019    A1C test (Diabetic or Prediabetic)  11/22/2020    Potassium monitoring  11/22/2020    Creatinine monitoring  11/22/2020    Flu vaccine  Completed    HIV screen  Completed    Hepatitis A vaccine  Aged Out    Hepatitis B vaccine  Aged Out    Hib vaccine  Aged Out    Meningococcal (ACWY) vaccine  Aged Out             (applicable per patient's age: Cancer Screenings, Depression Screening, Fall Risk Screening, Immunizations)    Hemoglobin A1C (%)   Date Value   11/22/2019 6.0   05/04/2018 5.7     Microalb/Crt.  Ratio (mcg/mg creat)   Date Value   05/04/2018 55 (H)     LDL Cholesterol (mg/dL)   Date Value   05/04/2018 162 (H)     AST (U/L)   Date Value   05/04/2018 20     ALT (U/L)   Date Value   05/04/2018 19     BUN (mg/dL)   Date Value   11/22/2019 6      (goal A1C is < 7)   (goal LDL is <100) need 30-50% reduction from baseline     BP Readings from Last 3 Encounters:   11/22/19 (!) 143/85   06/10/19 (!) 160/90   05/03/19 136/82    (goal /80)      All Future Testing planned in [facility]:  Lab Frequency Next Occurrence   Cologuard Once 11/22/2020   Full PFT Study With Bronchodilator Once 11/22/2019   Baseline Diagnostic Sleep Study Once 11/22/2019   Sleep Study with PAP Titration Once 11/22/2019   Sodium, Urine, Random Once 08/25/2020   Osmolality, Urine Once 08/25/2020   Osmolality, Serum Once 08/25/2020   Sodium Once 08/25/2020   Comprehensive Metabolic Panel Once 61/89/7894   Lipid Panel Once 08/09/2020   TSH with Reflex Once 08/09/2020   Hemoglobin A1C Once 08/09/2020            Patient Active Problem List:     Severe bipolar I disorder, current or most recent episode depressed ([facility]     HTN (hypertension)     Irritable bowel syndrome     GERD (gastroesophageal reflux disease)     Chronic back pain     Chronic diarrhea     Opioid withdrawal ([facility]

## 2020-06-26 NOTE — TELEPHONE ENCOUNTER
Alla request for pt requesting medication refill, amlodipine, omeprazole    Next Visit Date:  No future appointments. Health Maintenance   Topic Date Due    Pneumococcal 0-64 years Vaccine (1 of 1 - PPSV23) 12/17/1972    DTaP/Tdap/Td vaccine (1 - Tdap) 12/17/1985    Shingles Vaccine (1 of 2) 12/17/2016    Lipid screen  05/04/2019    Colon Cancer Screen FIT/FOBT  06/19/2019    A1C test (Diabetic or Prediabetic)  11/22/2020    Potassium monitoring  11/22/2020    Creatinine monitoring  11/22/2020    Flu vaccine  Completed    HIV screen  Completed    Hepatitis A vaccine  Aged Out    Hepatitis B vaccine  Aged Out    Hib vaccine  Aged Out    Meningococcal (ACWY) vaccine  Aged Out             (applicable per patient's age: Cancer Screenings, Depression Screening, Fall Risk Screening, Immunizations)    Hemoglobin A1C (%)   Date Value   11/22/2019 6.0   05/04/2018 5.7     Microalb/Crt.  Ratio (mcg/mg creat)   Date Value   05/04/2018 55 (H)     LDL Cholesterol (mg/dL)   Date Value   05/04/2018 162 (H)     AST (U/L)   Date Value   05/04/2018 20     ALT (U/L)   Date Value   05/04/2018 19     BUN (mg/dL)   Date Value   11/22/2019 6      (goal A1C is < 7)   (goal LDL is <100) need 30-50% reduction from baseline     BP Readings from Last 3 Encounters:   11/22/19 (!) 143/85   06/10/19 (!) 160/90   05/03/19 136/82    (goal /80)      All Future Testing planned in [facility]:  Lab Frequency Next Occurrence   Cologuard Once 11/22/2020   Full PFT Study With Bronchodilator Once 11/22/2019   Baseline Diagnostic Sleep Study Once 11/22/2019   Sleep Study with PAP Titration Once 11/22/2019   Sodium, Urine, Random Once 08/25/2020   Osmolality, Urine Once 08/25/2020   Osmolality, Serum Once 08/25/2020   Sodium Once 08/25/2020   Comprehensive Metabolic Panel Once 65/78/8554   Lipid Panel Once 08/09/2020   TSH with Reflex Once 08/09/2020   Hemoglobin A1C Once 08/09/2020            Patient Active Problem List:     Severe bipolar I disorder, current or most recent episode depressed ([facility]     HTN (hypertension)     Irritable bowel syndrome     GERD (gastroesophageal reflux disease)     Chronic back pain     Chronic diarrhea     Opioid withdrawal ([facility]

## 2020-07-02 RX ORDER — OMEPRAZOLE 20 MG/1
CAPSULE, DELAYED RELEASE ORAL
Qty: 30 CAPSULE | Refills: 3 | Status: SHIPPED | OUTPATIENT
Start: 2020-07-02 | End: 2020-11-12

## 2020-07-02 RX ORDER — AMLODIPINE BESYLATE 5 MG/1
TABLET ORAL
Qty: 30 TABLET | Refills: 3 | Status: SHIPPED | OUTPATIENT
Start: 2020-07-02 | End: 2020-11-12

## 2020-08-17 ENCOUNTER — TELEPHONE (OUTPATIENT)
Dept: INTERNAL MEDICINE | Age: 55
End: 2020-08-17

## 2020-08-17 NOTE — LETTER
[address]  [address] [address]  Phone: [phone]  Fax: [phone]    James Atkins MD        August 17, 2020    [facility]  [address] [facility] [address]      Dear Rosalia Bustamante: We are sending this letter because your PCP ordered [facility] for you to have done at your last visit here and they have not yet been completed. If you can please come to our office on the 2nd floor to  your orders to have them compelted. If you do not have a follow-up appointment scheduled you can either contact the office to make an appointment with us or you can make one when you come in to pick-up your orders. If you have any questions or concerns, please don't hesitate to call.     Sincerely,        James Atkins MD

## 2020-09-11 NOTE — TELEPHONE ENCOUNTER
Phone call to patient due for follow up in May - left [facility]. Request for atorvastatin - med pended. Please filli f appropriate. Next Visit Date:  No future appointments. Health Maintenance   Topic Date Due    Pneumococcal 0-64 years Vaccine (1 of 1 - PPSV23) 12/17/1972    DTaP/Tdap/Td vaccine (1 - Tdap) 12/17/1985    Shingles Vaccine (1 of 2) 12/17/2016    Lipid screen  05/04/2019    Colon Cancer Screen FIT/FOBT  06/19/2019    Flu vaccine (1) 09/01/2020    A1C test (Diabetic or Prediabetic)  11/22/2020    Potassium monitoring  11/22/2020    Creatinine monitoring  11/22/2020    HIV screen  Completed    Hepatitis A vaccine  Aged Out    Hepatitis B vaccine  Aged Out    Hib vaccine  Aged Out    Meningococcal (ACWY) vaccine  Aged Out       Hemoglobin A1C (%)   Date Value   11/22/2019 6.0   05/04/2018 5.7             ( goal A1C is < 7)   Microalb/Crt.  Ratio (mcg/mg creat)   Date Value   05/04/2018 55 (H)     LDL Cholesterol (mg/dL)   Date Value   05/04/2018 162 (H)       (goal LDL is <100)   AST (U/L)   Date Value   05/04/2018 20     ALT (U/L)   Date Value   05/04/2018 19     BUN (mg/dL)   Date Value   11/22/2019 6     BP Readings from Last 3 Encounters:   11/22/19 (!) 143/85   06/10/19 (!) 160/90   05/03/19 136/82          (goal 120/80)    All Future Testing planned in [facility]  Lab Frequency Next Occurrence   Cologuard Once 11/22/2020   Full PFT Study With Bronchodilator Once 11/22/2019   Baseline Diagnostic Sleep Study Once 11/22/2019   Sleep Study with PAP Titration Once 11/22/2019   Sodium, Urine, Random Once 11/25/2020   Osmolality, Urine Once 11/25/2020   Osmolality, Serum Once 11/25/2020   Sodium Once 11/25/2020   Comprehensive Metabolic Panel Once 45/60/7198   Lipid Panel Once 10/17/2020   TSH with Reflex Once 10/17/2020   Hemoglobin A1C Once 10/17/2020         Patient Active Problem List:     Severe bipolar I disorder, current or most recent episode depressed ([address]     HTN

## 2020-09-12 RX ORDER — ATORVASTATIN CALCIUM 40 MG/1
TABLET, FILM COATED ORAL
Qty: 30 TABLET | Refills: 2 | Status: SHIPPED | OUTPATIENT
Start: 2020-09-12 | End: 2020-12-16

## 2020-10-27 ENCOUNTER — TELEPHONE (OUTPATIENT)
Dept: INTERNAL MEDICINE | Age: 55
End: 2020-10-27

## 2020-10-27 NOTE — LETTER
[address]LE [address]  [address] [address]  Phone: [phone]  Fax: [phone]    Chloe Hobbs MD        October 27, 2020    [facility]  [address] [facility] [address]      Dear [facility]: We are sending this letter because your PCP ordered [facility] for you to have done at your last visit here and they have not yet been completed. If you can please come to our office on the 2nd floor to  your orders to have them compelted. If you do not have a follow-up appointment scheduled you can either contact the office to make an appointment with us or you can make one when you come in to pick-up your orders. If you have any questions or concerns, please don't hesitate to call.     Sincerely,        Chloe Hobbs MD

## 2020-11-12 RX ORDER — OMEPRAZOLE 20 MG/1
CAPSULE, DELAYED RELEASE ORAL
Qty: 30 CAPSULE | Refills: 3 | Status: SHIPPED | OUTPATIENT
Start: 2020-11-12 | End: 2021-04-04

## 2020-11-12 RX ORDER — LISINOPRIL 40 MG/1
TABLET ORAL
Qty: 90 TABLET | Refills: 1 | Status: ON HOLD | OUTPATIENT
Start: 2020-11-12 | End: 2021-12-09 | Stop reason: HOSPADM

## 2020-11-12 RX ORDER — AMLODIPINE BESYLATE 5 MG/1
TABLET ORAL
Qty: 30 TABLET | Refills: 3 | Status: SHIPPED | OUTPATIENT
Start: 2020-11-12 | End: 2021-04-04

## 2020-11-12 NOTE — TELEPHONE ENCOUNTER
Request for omeprazole, amlodipine - meds pended. Please fill if appropriate. Next Visit Date:  No future appointments. Health Maintenance   Topic Date Due    Pneumococcal 0-64 years Vaccine (1 of 1 - PPSV23) 12/17/1972    DTaP/Tdap/Td vaccine (1 - Tdap) 12/17/1985    Shingles Vaccine (1 of 2) 12/17/2016    Lipid screen  05/04/2019    Colon Cancer Screen FIT/FOBT  06/19/2019    Flu vaccine (1) 09/01/2020    A1C test (Diabetic or Prediabetic)  11/22/2020    Potassium monitoring  11/22/2020    Creatinine monitoring  11/22/2020    HIV screen  Completed    Hepatitis A vaccine  Aged Out    Hepatitis B vaccine  Aged Out    Hib vaccine  Aged Out    Meningococcal (ACWY) vaccine  Aged Out       Hemoglobin A1C (%)   Date Value   11/22/2019 6.0   05/04/2018 5.7             ( goal A1C is < 7)   Microalb/Crt.  Ratio (mcg/mg creat)   Date Value   05/04/2018 55 (H)     LDL Cholesterol (mg/dL)   Date Value   05/04/2018 162 (H)       (goal LDL is <100)   AST (U/L)   Date Value   05/04/2018 20     ALT (U/L)   Date Value   05/04/2018 19     BUN (mg/dL)   Date Value   11/22/2019 6     BP Readings from Last 3 Encounters:   11/22/19 (!) 143/85   06/10/19 (!) 160/90   05/03/19 136/82          (goal 120/80)    All Future Testing planned in [facility]  Lab Frequency Next Occurrence   Cologuard Once 11/22/2020   Full PFT Study With Bronchodilator Once 11/22/2019   Baseline Diagnostic Sleep Study Once 11/22/2019   Sleep Study with PAP Titration Once 11/22/2019   Sodium, Urine, Random Once 11/25/2020   Osmolality, Urine Once 11/25/2020   Osmolality, Serum Once 11/25/2020   Sodium Once 11/25/2020   Comprehensive Metabolic Panel Once 81/88/5054   Lipid Panel Once 01/27/2021   TSH with Reflex Once 01/27/2021   Hemoglobin A1C Once 01/27/2021         Patient Active Problem List:     Severe bipolar I disorder, current or most recent episode depressed ([facility] [facility]     HTN (hypertension)     Irritable bowel syndrome     GERD (gastroesophageal reflux disease)     Chronic back pain     Chronic diarrhea     Opioid withdrawal ([facility] [address]

## 2020-11-12 NOTE — TELEPHONE ENCOUNTER
Phone call to patient for follow up - left voicemail. Request for lisinopril - med pended. Please fill if appropriate. Next Visit Date:  No future appointments. Health Maintenance   Topic Date Due    Pneumococcal 0-64 years Vaccine (1 of 1 - PPSV23) 12/17/1972    DTaP/Tdap/Td vaccine (1 - Tdap) 12/17/1985    Shingles Vaccine (1 of 2) 12/17/2016    Lipid screen  05/04/2019    Colon Cancer Screen FIT/FOBT  06/19/2019    Flu vaccine (1) 09/01/2020    A1C test (Diabetic or Prediabetic)  11/22/2020    Potassium monitoring  11/22/2020    Creatinine monitoring  11/22/2020    HIV screen  Completed    Hepatitis A vaccine  Aged Out    Hepatitis B vaccine  Aged Out    Hib vaccine  Aged Out    Meningococcal (ACWY) vaccine  Aged Out       Hemoglobin A1C (%)   Date Value   11/22/2019 6.0   05/04/2018 5.7             ( goal A1C is < 7)   Microalb/Crt.  Ratio (mcg/mg creat)   Date Value   05/04/2018 55 (H)     LDL Cholesterol (mg/dL)   Date Value   05/04/2018 162 (H)       (goal LDL is <100)   AST (U/L)   Date Value   05/04/2018 20     ALT (U/L)   Date Value   05/04/2018 19     BUN (mg/dL)   Date Value   11/22/2019 6     BP Readings from Last 3 Encounters:   11/22/19 (!) 143/85   06/10/19 (!) 160/90   05/03/19 136/82          (goal 120/80)    All Future Testing planned in [facility]  Lab Frequency Next Occurrence   Cologuard Once 11/22/2020   Full PFT Study With Bronchodilator Once 11/22/2019   Baseline Diagnostic Sleep Study Once 11/22/2019   Sleep Study with PAP Titration Once 11/22/2019   Sodium, Urine, Random Once 11/25/2020   Osmolality, Urine Once 11/25/2020   Osmolality, Serum Once 11/25/2020   Sodium Once 11/25/2020   Comprehensive Metabolic Panel Once 80/30/8702   Lipid Panel Once 01/27/2021   TSH with Reflex Once 01/27/2021   Hemoglobin A1C Once 01/27/2021         Patient Active Problem List:     Severe bipolar I disorder, current or most recent episode depressed ([address]     HTN (hypertension) Irritable bowel syndrome     GERD (gastroesophageal reflux disease)     Chronic back pain     Chronic diarrhea     Opioid withdrawal ([facility]

## 2020-12-14 NOTE — TELEPHONE ENCOUNTER
Last visit: 05/01/2020  Last Med refill: 11/12/2020  Does patient have enough medication for 72 hours: No:     Next Visit Date:  No future appointments. Health Maintenance   Topic Date Due    Pneumococcal 0-64 years Vaccine (1 of 1 - PPSV23) 12/17/1972    DTaP/Tdap/Td vaccine (1 - Tdap) 12/17/1985    Shingles Vaccine (1 of 2) 12/17/2016    Lipid screen  05/04/2019    Colon Cancer Screen FIT/FOBT  06/19/2019    Flu vaccine (1) 09/01/2020    A1C test (Diabetic or Prediabetic)  11/22/2020    Potassium monitoring  11/22/2020    Creatinine monitoring  11/22/2020    HIV screen  Completed    Hepatitis A vaccine  Aged Out    Hepatitis B vaccine  Aged Out    Hib vaccine  Aged Out    Meningococcal (ACWY) vaccine  Aged Out       Hemoglobin A1C (%)   Date Value   11/22/2019 6.0   05/04/2018 5.7             ( goal A1C is < 7)   Microalb/Crt.  Ratio (mcg/mg creat)   Date Value   05/04/2018 55 (H)     LDL Cholesterol (mg/dL)   Date Value   05/04/2018 162 (H)   07/17/2014 86       (goal LDL is <100)   AST (U/L)   Date Value   05/04/2018 20     ALT (U/L)   Date Value   05/04/2018 19     BUN (mg/dL)   Date Value   11/22/2019 6     BP Readings from Last 3 Encounters:   11/22/19 (!) 143/85   06/10/19 (!) 160/90   05/03/19 136/82          (goal 120/80)    All Future Testing planned in [facility]  Lab Frequency Next Occurrence   Comprehensive Metabolic Panel Once 70/85/8461   Lipid Panel Once 01/27/2021   TSH with Reflex Once 01/27/2021   Hemoglobin A1C Once 01/27/2021               Patient Active Problem List:     Severe bipolar I disorder, current or most recent episode depressed (HCC)     HTN (hypertension)     Irritable bowel syndrome     GERD (gastroesophageal reflux disease)     Chronic back pain     Chronic diarrhea     Opioid withdrawal ([facility] [facility]

## 2020-12-16 RX ORDER — ATORVASTATIN CALCIUM 40 MG/1
TABLET, FILM COATED ORAL
Qty: 30 TABLET | Refills: 2 | Status: SHIPPED | OUTPATIENT
Start: 2020-12-16 | End: 2021-03-03

## 2021-02-04 ENCOUNTER — TELEPHONE (OUTPATIENT)
Dept: INTERNAL MEDICINE | Age: 56
End: 2021-02-04

## 2021-02-04 NOTE — LETTER
[address]  [address] [address]  Phone: [phone]  Fax: [phone]    Moi Fair MD        February 4, 2021    Adrian [facility] Burk[address] [place] [id number]      Dear Jessy Schlatter: This letter is a reminder that you may have diagnostic testing that has not been completed. It is important to your well-being that these test(s) are performed. Please find the outstanding test(s) attached. If you could please have these completed before your next appointment. You can have the test completed at any [facility] or Lab. Please see the order for scheduling instructions. Any testing that needs completed other than blood work or xray's please call [phone] to schedule an appointment. Otherwise can be done at any [facility]. Please call our office at Dept: [phone] for additional information on the outstanding tests or let us know if they have been completed so we may update your chart. If you have any questions or concerns, please don't hesitate to call.     Sincerely,        Moi Fair MD

## 2021-03-03 DIAGNOSIS — E78.5 DYSLIPIDEMIA: ICD-10-CM

## 2021-03-03 RX ORDER — ATORVASTATIN CALCIUM 40 MG/1
TABLET, FILM COATED ORAL
Qty: 30 TABLET | Refills: 2 | Status: ON HOLD | OUTPATIENT
Start: 2021-03-03 | End: 2021-12-09 | Stop reason: SDUPTHER

## 2021-03-03 NOTE — TELEPHONE ENCOUNTER
Refill request for Atorvastatin. If appropriate please send medication(s) to patients pharmacy. Next appt: 4/29/2021       Health Maintenance   Topic Date Due    Hepatitis C screen  Never done    Pneumococcal 0-64 years Vaccine (1 of 1 - PPSV23) Never done    DTaP/Tdap/Td vaccine (1 - Tdap) Never done    Shingles Vaccine (1 of 2) Never done    Lipid screen  05/04/2019    Colon Cancer Screen FIT/FOBT  06/19/2019    Flu vaccine (1) 09/01/2020    A1C test (Diabetic or Prediabetic)  11/22/2020    Potassium monitoring  11/22/2020    Creatinine monitoring  11/22/2020    HIV screen  Completed    Hepatitis A vaccine  Aged Out    Hepatitis B vaccine  Aged Out    Hib vaccine  Aged Out    Meningococcal (ACWY) vaccine  Aged Out       Hemoglobin A1C (%)   Date Value   11/22/2019 6.0   05/04/2018 5.7             ( goal A1C is < 7)   Microalb/Crt.  Ratio (mcg/mg creat)   Date Value   05/04/2018 55 (H)     LDL Cholesterol (mg/dL)   Date Value   05/04/2018 162 (H)       (goal LDL is <100)   AST (U/L)   Date Value   05/04/2018 20     ALT (U/L)   Date Value   05/04/2018 19     BUN (mg/dL)   Date Value   11/22/2019 6     BP Readings from Last 3 Encounters:   11/22/19 (!) 143/85   06/10/19 (!) 160/90   05/03/19 136/82          (goal 120/80)          Patient Active Problem List:     Severe bipolar I disorder, current or most recent episode depressed (HCC)     HTN (hypertension)     Irritable bowel syndrome     GERD (gastroesophageal reflux disease)     Chronic back pain     Chronic diarrhea     Opioid withdrawal ([facility]

## 2021-03-26 DIAGNOSIS — K21.9 GASTROESOPHAGEAL REFLUX DISEASE WITHOUT ESOPHAGITIS: ICD-10-CM

## 2021-03-26 DIAGNOSIS — I10 ESSENTIAL HYPERTENSION: ICD-10-CM

## 2021-03-26 NOTE — TELEPHONE ENCOUNTER
Multiple meds pended      Pt has future appointment as new pt with dr Osvaldo Cook        Next Visit Date:  Future Appointments   Date Time Provider Sudarshan Tam   4/29/2021  2:30 PM Mynor Benton MD [facility] [facility]   Topic Date Due    Hepatitis C screen  Never done    Pneumococcal 0-64 years Vaccine (1 of 1 - PPSV23) Never done    COVID-19 Vaccine (1) Never done    DTaP/Tdap/Td vaccine (1 - Tdap) Never done    Shingles Vaccine (1 of 2) Never done    Lipid screen  05/04/2019    Colon Cancer Screen FIT/FOBT  06/19/2019    Flu vaccine (1) 09/01/2020    A1C test (Diabetic or Prediabetic)  11/22/2020    Potassium monitoring  11/22/2020    Creatinine monitoring  11/22/2020    HIV screen  Completed    Hepatitis A vaccine  Aged Out    Hepatitis B vaccine  Aged Out    Hib vaccine  Aged Out    Meningococcal (ACWY) vaccine  Aged Out       Hemoglobin A1C (%)   Date Value   11/22/2019 6.0   05/04/2018 5.7             ( goal A1C is < 7)   Microalb/Crt.  Ratio (mcg/mg creat)   Date Value   05/04/2018 55 (H)     LDL Cholesterol (mg/dL)   Date Value   05/04/2018 162 (H)   07/17/2014 86       (goal LDL is <100)   AST (U/L)   Date Value   05/04/2018 20     ALT (U/L)   Date Value   05/04/2018 19     BUN (mg/dL)   Date Value   11/22/2019 6     BP Readings from Last 3 Encounters:   11/22/19 (!) 143/85   06/10/19 (!) 160/90   05/03/19 136/82          (goal 120/80)    All Future Testing planned in [facility]  Lab Frequency Next Occurrence   Comprehensive Metabolic Panel Once 65/66/8170   Lipid Panel Once 05/01/2021   TSH with Reflex Once 05/01/2021   Hemoglobin A1C Once 05/01/2021               Patient Active Problem List:     Severe bipolar I disorder, current or most recent episode depressed (HCC)     HTN (hypertension)     Irritable bowel syndrome     GERD (gastroesophageal reflux disease)     Chronic back pain     Chronic diarrhea     Opioid withdrawal ([facility] [facility]

## 2021-04-04 RX ORDER — OMEPRAZOLE 20 MG/1
CAPSULE, DELAYED RELEASE ORAL
Qty: 30 CAPSULE | Refills: 3 | Status: ON HOLD | OUTPATIENT
Start: 2021-04-04 | End: 2021-12-09 | Stop reason: SDUPTHER

## 2021-04-04 RX ORDER — AMLODIPINE BESYLATE 5 MG/1
TABLET ORAL
Qty: 30 TABLET | Refills: 3 | Status: ON HOLD | OUTPATIENT
Start: 2021-04-04 | End: 2022-02-08 | Stop reason: HOSPADM

## 2021-12-05 ENCOUNTER — HOSPITAL ENCOUNTER (INPATIENT)
Age: 56
LOS: 5 days | Discharge: HOME | DRG: 753 | End: 2021-12-10
Attending: EMERGENCY MEDICINE | Admitting: PSYCHIATRY & NEUROLOGY
Payer: MEDICARE

## 2021-12-05 DIAGNOSIS — K21.9 GASTROESOPHAGEAL REFLUX DISEASE WITHOUT ESOPHAGITIS: ICD-10-CM

## 2021-12-05 DIAGNOSIS — J30.2 SEASONAL ALLERGIC RHINITIS, UNSPECIFIED TRIGGER: ICD-10-CM

## 2021-12-05 DIAGNOSIS — F32.A DEPRESSION WITH SUICIDAL IDEATION: Primary | ICD-10-CM

## 2021-12-05 DIAGNOSIS — I10 UNCONTROLLED HYPERTENSION: ICD-10-CM

## 2021-12-05 DIAGNOSIS — R45.851 DEPRESSION WITH SUICIDAL IDEATION: Primary | ICD-10-CM

## 2021-12-05 DIAGNOSIS — E78.5 DYSLIPIDEMIA: ICD-10-CM

## 2021-12-05 DIAGNOSIS — J42 CHRONIC BRONCHITIS, UNSPECIFIED CHRONIC BRONCHITIS TYPE (HCC): ICD-10-CM

## 2021-12-05 DIAGNOSIS — I10 ESSENTIAL HYPERTENSION: ICD-10-CM

## 2021-12-05 LAB
ABSOLUTE EOS #: 0.1 K/UL (ref 0–0.4)
ABSOLUTE IMMATURE GRANULOCYTE: ABNORMAL K/UL (ref 0–0.3)
ABSOLUTE LYMPH #: 2.2 K/UL (ref 1–4.8)
ABSOLUTE MONO #: 0.8 K/UL (ref 0.1–1.3)
ALBUMIN SERPL-MCNC: 4.7 G/DL (ref 3.5–5.2)
ALBUMIN/GLOBULIN RATIO: ABNORMAL (ref 1–2.5)
ALP BLD-CCNC: 60 U/L (ref 40–129)
ALT SERPL-CCNC: 9 U/L (ref 5–41)
AMPHETAMINE SCREEN URINE: NEGATIVE
ANION GAP SERPL CALCULATED.3IONS-SCNC: 10 MMOL/L (ref 9–17)
AST SERPL-CCNC: 13 U/L
BARBITURATE SCREEN URINE: NEGATIVE
BASOPHILS # BLD: 2 % (ref 0–2)
BASOPHILS ABSOLUTE: 0.2 K/UL (ref 0–0.2)
BENZODIAZEPINE SCREEN, URINE: NEGATIVE
BILIRUB SERPL-MCNC: 0.42 MG/DL (ref 0.3–1.2)
BUN BLDV-MCNC: 16 MG/DL (ref 6–20)
BUN/CREAT BLD: ABNORMAL (ref 9–20)
BUPRENORPHINE URINE: ABNORMAL
CALCIUM SERPL-MCNC: 10.2 MG/DL (ref 8.6–10.4)
CANNABINOID SCREEN URINE: POSITIVE
CHLORIDE BLD-SCNC: 97 MMOL/L (ref 98–107)
CO2: 28 MMOL/L (ref 20–31)
COCAINE METABOLITE, URINE: NEGATIVE
CREAT SERPL-MCNC: 0.66 MG/DL (ref 0.7–1.2)
DIFFERENTIAL TYPE: ABNORMAL
EOSINOPHILS RELATIVE PERCENT: 1 % (ref 0–4)
ETHANOL PERCENT: <0.01 %
ETHANOL: <10 MG/DL
GFR AFRICAN AMERICAN: >60 ML/MIN
GFR NON-AFRICAN AMERICAN: >60 ML/MIN
GFR SERPL CREATININE-BSD FRML MDRD: ABNORMAL ML/MIN/{1.73_M2}
GFR SERPL CREATININE-BSD FRML MDRD: ABNORMAL ML/MIN/{1.73_M2}
GLUCOSE BLD-MCNC: 111 MG/DL (ref 70–99)
HCT VFR BLD CALC: 41.7 % (ref 41–53)
HEMOGLOBIN: 14.1 G/DL (ref 13.5–17.5)
IMMATURE GRANULOCYTES: ABNORMAL %
LYMPHOCYTES # BLD: 18 % (ref 24–44)
MCH RBC QN AUTO: 30.9 PG (ref 26–34)
MCHC RBC AUTO-ENTMCNC: 33.9 G/DL (ref 31–37)
MCV RBC AUTO: 91.2 FL (ref 80–100)
MDMA URINE: ABNORMAL
METHADONE SCREEN, URINE: POSITIVE
METHAMPHETAMINE, URINE: ABNORMAL
MONOCYTES # BLD: 7 % (ref 1–7)
NRBC AUTOMATED: ABNORMAL PER 100 WBC
OPIATES, URINE: NEGATIVE
OXYCODONE SCREEN URINE: NEGATIVE
PDW BLD-RTO: 13.9 % (ref 11.5–14.9)
PHENCYCLIDINE, URINE: NEGATIVE
PLATELET # BLD: 317 K/UL (ref 150–450)
PLATELET ESTIMATE: ABNORMAL
PMV BLD AUTO: 6.5 FL (ref 6–12)
POTASSIUM SERPL-SCNC: 4.1 MMOL/L (ref 3.7–5.3)
PROPOXYPHENE, URINE: ABNORMAL
RBC # BLD: 4.57 M/UL (ref 4.5–5.9)
RBC # BLD: ABNORMAL 10*6/UL
SARS-COV-2, RAPID: NOT DETECTED
SEG NEUTROPHILS: 72 % (ref 36–66)
SEGMENTED NEUTROPHILS ABSOLUTE COUNT: 8.8 K/UL (ref 1.3–9.1)
SODIUM BLD-SCNC: 135 MMOL/L (ref 135–144)
SPECIMEN DESCRIPTION: NORMAL
TEST INFORMATION: ABNORMAL
TOTAL PROTEIN: 7.1 G/DL (ref 6.4–8.3)
TRICYCLIC ANTIDEPRESSANTS, UR: ABNORMAL
WBC # BLD: 12.1 K/UL (ref 3.5–11)
WBC # BLD: ABNORMAL 10*3/UL

## 2021-12-05 PROCEDURE — 80307 DRUG TEST PRSMV CHEM ANLYZR: CPT

## 2021-12-05 PROCEDURE — 6370000000 HC RX 637 (ALT 250 FOR IP): Performed by: PSYCHIATRY & NEUROLOGY

## 2021-12-05 PROCEDURE — 80053 COMPREHEN METABOLIC PANEL: CPT

## 2021-12-05 PROCEDURE — 99284 EMERGENCY DEPT VISIT MOD MDM: CPT

## 2021-12-05 PROCEDURE — G0480 DRUG TEST DEF 1-7 CLASSES: HCPCS

## 2021-12-05 PROCEDURE — 87635 SARS-COV-2 COVID-19 AMP PRB: CPT

## 2021-12-05 PROCEDURE — 1240000000 HC EMOTIONAL WELLNESS R&B

## 2021-12-05 PROCEDURE — 6370000000 HC RX 637 (ALT 250 FOR IP): Performed by: EMERGENCY MEDICINE

## 2021-12-05 PROCEDURE — 36415 COLL VENOUS BLD VENIPUNCTURE: CPT

## 2021-12-05 PROCEDURE — 85025 COMPLETE CBC W/AUTO DIFF WBC: CPT

## 2021-12-05 RX ORDER — HYDROXYZINE 50 MG/1
50 TABLET, FILM COATED ORAL 3 TIMES DAILY PRN
Status: DISCONTINUED | OUTPATIENT
Start: 2021-12-05 | End: 2021-12-10 | Stop reason: HOSPADM

## 2021-12-05 RX ORDER — LORAZEPAM 1 MG/1
1 TABLET ORAL ONCE
Status: COMPLETED | OUTPATIENT
Start: 2021-12-05 | End: 2021-12-05

## 2021-12-05 RX ORDER — MAGNESIUM HYDROXIDE/ALUMINUM HYDROXICE/SIMETHICONE 120; 1200; 1200 MG/30ML; MG/30ML; MG/30ML
30 SUSPENSION ORAL EVERY 6 HOURS PRN
Status: DISCONTINUED | OUTPATIENT
Start: 2021-12-05 | End: 2021-12-10 | Stop reason: HOSPADM

## 2021-12-05 RX ORDER — IBUPROFEN 400 MG/1
400 TABLET ORAL EVERY 6 HOURS PRN
Status: DISCONTINUED | OUTPATIENT
Start: 2021-12-05 | End: 2021-12-10 | Stop reason: HOSPADM

## 2021-12-05 RX ORDER — LORAZEPAM 1 MG/1
2 TABLET ORAL EVERY 4 HOURS PRN
Status: DISCONTINUED | OUTPATIENT
Start: 2021-12-05 | End: 2021-12-10 | Stop reason: HOSPADM

## 2021-12-05 RX ORDER — HALOPERIDOL 5 MG/ML
5 INJECTION INTRAMUSCULAR EVERY 4 HOURS PRN
Status: DISCONTINUED | OUTPATIENT
Start: 2021-12-05 | End: 2021-12-10 | Stop reason: HOSPADM

## 2021-12-05 RX ORDER — DIPHENHYDRAMINE HYDROCHLORIDE 50 MG/ML
50 INJECTION INTRAMUSCULAR; INTRAVENOUS EVERY 4 HOURS PRN
Status: DISCONTINUED | OUTPATIENT
Start: 2021-12-05 | End: 2021-12-10 | Stop reason: HOSPADM

## 2021-12-05 RX ORDER — LANOLIN ALCOHOL/MO/W.PET/CERES
1.5 CREAM (GRAM) TOPICAL NIGHTLY PRN
Status: DISCONTINUED | OUTPATIENT
Start: 2021-12-06 | End: 2021-12-10 | Stop reason: HOSPADM

## 2021-12-05 RX ORDER — HALOPERIDOL 5 MG
5 TABLET ORAL EVERY 4 HOURS PRN
Status: DISCONTINUED | OUTPATIENT
Start: 2021-12-05 | End: 2021-12-10 | Stop reason: HOSPADM

## 2021-12-05 RX ORDER — TRAZODONE HYDROCHLORIDE 50 MG/1
50 TABLET ORAL NIGHTLY PRN
Status: DISCONTINUED | OUTPATIENT
Start: 2021-12-05 | End: 2021-12-05

## 2021-12-05 RX ORDER — AMLODIPINE BESYLATE 5 MG/1
5 TABLET ORAL ONCE
Status: COMPLETED | OUTPATIENT
Start: 2021-12-05 | End: 2021-12-05

## 2021-12-05 RX ORDER — LISINOPRIL 20 MG/1
40 TABLET ORAL ONCE
Status: COMPLETED | OUTPATIENT
Start: 2021-12-05 | End: 2021-12-05

## 2021-12-05 RX ORDER — LORAZEPAM 2 MG/ML
2 INJECTION INTRAMUSCULAR EVERY 4 HOURS PRN
Status: DISCONTINUED | OUTPATIENT
Start: 2021-12-05 | End: 2021-12-10 | Stop reason: HOSPADM

## 2021-12-05 RX ORDER — ACETAMINOPHEN 325 MG/1
650 TABLET ORAL EVERY 4 HOURS PRN
Status: DISCONTINUED | OUTPATIENT
Start: 2021-12-05 | End: 2021-12-10 | Stop reason: HOSPADM

## 2021-12-05 RX ORDER — POLYETHYLENE GLYCOL 3350 17 G/17G
17 POWDER, FOR SOLUTION ORAL DAILY PRN
Status: DISCONTINUED | OUTPATIENT
Start: 2021-12-05 | End: 2021-12-10 | Stop reason: HOSPADM

## 2021-12-05 RX ADMIN — LISINOPRIL 40 MG: 20 TABLET ORAL at 16:21

## 2021-12-05 RX ADMIN — HYDROXYZINE HYDROCHLORIDE 50 MG: 50 TABLET, FILM COATED ORAL at 22:45

## 2021-12-05 RX ADMIN — NICOTINE POLACRILEX 2 MG: 2 GUM, CHEWING BUCCAL at 20:41

## 2021-12-05 RX ADMIN — AMLODIPINE BESYLATE 5 MG: 5 TABLET ORAL at 16:21

## 2021-12-05 RX ADMIN — NICOTINE POLACRILEX 2 MG: 2 GUM, CHEWING BUCCAL at 22:46

## 2021-12-05 RX ADMIN — LORAZEPAM 1 MG: 1 TABLET ORAL at 16:21

## 2021-12-05 ASSESSMENT — SLEEP AND FATIGUE QUESTIONNAIRES
DIFFICULTY FALLING ASLEEP: NO
DIFFICULTY STAYING ASLEEP: YES
RESTFUL SLEEP: NO
DO YOU HAVE DIFFICULTY SLEEPING: YES
DIFFICULTY ARISING: NO
SLEEP PATTERN: RESTLESSNESS
AVERAGE NUMBER OF SLEEP HOURS: 3
SLEEP PATTERN: RESTLESSNESS;DISTURBED/INTERRUPTED SLEEP
DO YOU USE A SLEEP AID: YES
DIFFICULTY FALLING ASLEEP: YES
DIFFICULTY STAYING ASLEEP: YES
DO YOU HAVE DIFFICULTY SLEEPING: YES
DIFFICULTY ARISING: NO
RESTFUL SLEEP: NO
DO YOU USE A SLEEP AID: NO
AVERAGE NUMBER OF SLEEP HOURS: 5

## 2021-12-05 ASSESSMENT — LIFESTYLE VARIABLES: HISTORY_ALCOHOL_USE: NO

## 2021-12-05 ASSESSMENT — PAIN SCALES - GENERAL: PAINLEVEL_OUTOF10: 0

## 2021-12-05 ASSESSMENT — PATIENT HEALTH QUESTIONNAIRE - PHQ9: SUM OF ALL RESPONSES TO PHQ QUESTIONS 1-9: 9

## 2021-12-05 NOTE — ED PROVIDER NOTES
EMERGENCY DEPARTMENT ENCOUNTER    Pt Name: Moira Mitchell  MRN: [id number]  Birthdate 1965  Date of evaluation: 12/5/21  CHIEF COMPLAINT       Chief Complaint   Patient presents with    Suicidal     HISTORY OF PRESENT ILLNESS     Mental Health Problem  Presenting symptoms: depression and suicidal thoughts    Degree of incapacity (severity):  Severe  Onset quality:  Gradual  Timing:  Constant  Progression:  Worsening  Chronicity:  Recurrent  Context comment:  Bipolar, off all his meds  Treatment compliance:  Untreated  Relieved by:  Nothing  Worsened by:  Nothing  Ineffective treatments:  None tried  Associated symptoms: anxiety, feelings of worthlessness, irritability and poor judgment      Thoughts of crashing his car  Not taking any medications, no BP meds either      REVIEW OF SYSTEMS     Review of Systems   Constitutional: Positive for irritability. Psychiatric/Behavioral: Positive for suicidal ideas. The patient is nervous/anxious. All other systems reviewed and are negative. 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 No pertinent surgical history.   CURRENT MEDICATIONS       Previous Medications    AMLODIPINE (NORVASC) 5 MG TABLET    take 1 tablet by mouth once daily    ATORVASTATIN (LIPITOR) 40 MG TABLET    take 1 tablet by mouth once daily    BUPRENORPHINE HCL-NALOXONE HCL (SUBOXONE SL)    Take 8 mg by mouth     BUPRENORPHINE-NALOXONE (SUBOXONE) 8-2 MG FILM SL FILM        BUPROPION [facility] XL) 300 MG EXTENDED RELEASE TABLET    Take 300 mg by mouth every morning    CETIRIZINE (ZYRTEC) 10 MG TABLET    Take 1 tablet by mouth daily    FLUTICASONE (FLONASE) 50 MCG/ACT NASAL SPRAY    instill 1 spray into each nostril once daily    GABAPENTIN (NEURONTIN) 300 MG CAPSULE    Take 1 capsule by mouth nightly. Sherryle Moder GABAPENTIN (NEURONTIN) 800 MG TABLET    Take 1 tablet by mouth three times daily. Sherryle Moder LISINOPRIL (PRINIVIL;ZESTRIL) 40 MG TABLET    take 1 tablet by mouth once daily    METOPROLOL TARTRATE (LOPRESSOR) 25 MG TABLET    Take 1 tablet by mouth 2 times daily    OMEPRAZOLE (PRILOSEC) 20 MG DELAYED RELEASE CAPSULE    take 1 capsule by mouth once daily WITH BREAKFAST    ONDANSETRON (ZOFRAN) 4 MG TABLET    Take 1 tablet by mouth daily as needed for Nausea or Vomiting    OXCARBAZEPINE (TRILEPTAL) 600 MG TABLET    Take 600 mg by mouth 2 times daily    TIOTROPIUM (SPIRIVA HANDIHALER) 18 MCG INHALATION CAPSULE    Inhale 1 capsule into the lungs daily    TRINTELLIX 10 MG TABS TABLET    Take 3 tablets by mouth daily    VENTOLIN  (90 BASE) MCG/ACT INHALER    Inhale 2 puffs into the lungs every 6 hours as needed for Wheezing    VORTIOXETINE (BRINTELLIX) 5 MG TABS TABLET    Take 3 tablets by mouth daily. ALLERGIES     is allergic to skelaxin [metaxalone]. FAMILY HISTORY     He indicated that the status of his mother is unknown. He indicated that the status of his father is unknown. He indicated that the status of his brother is unknown.      SOCIAL HISTORY       Social History     Tobacco Use    Smoking status: Current Every Day Smoker     Packs/day: 1.00     Years: 33.00     Pack years: 33.00     Types: Pipe, Cigarettes     Start date: 7/17/2002    Smokeless tobacco: Never Used   Vaping Use    Vaping Use: Never used   Substance Use Topics    Alcohol use: Not Currently     Comment: Currently in the past    Drug use: Not Currently     Types: Cocaine     Comment: Last was 15 years ago     PHYSICAL EXAM Result Value    WBC 12.1 (*)     Seg Neutrophils 72 (*)     Lymphocytes 18 (*)     All other components within normal limits   COMPREHENSIVE METABOLIC PANEL - Abnormal; Notable for the following components:    Glucose 111 (*)     CREATININE 0.66 (*)     Chloride 97 (*)     All other components within normal limits   URINE DRUG SCREEN - Abnormal; Notable for the following components:    Methadone Screen, Urine POSITIVE (*)     Cannabinoid Scrn, Ur POSITIVE (*)     All other components within normal limits   COVID-19, RAPID   ETHANOL       EMERGENCY DEPARTMENTCOURSE:         Vitals:    Vitals:    12/05/21 1412   BP: (!) 215/109   Pulse: 79   Resp: 18   Temp: 97.8 °F (36.6 °C)   TempSrc: Temporal   SpO2: 95%       The patient was given the following medications while in the emergency department:  Orders Placed This Encounter   Medications    lisinopril (PRINIVIL;ZESTRIL) tablet 40 mg    amLODIPine (NORVASC) tablet 5 mg    LORazepam (ATIVAN) tablet 1 mg     FINAL IMPRESSION      1. Depression with suicidal ideation    2. Uncontrolled hypertension          DISPOSITION/PLAN   DISPOSITION        The care is provided during an unprecedented national emergency due to the novel coronavirus, COVID 19.   Caleb Pollard MD  Attending Emergency Physician                    Caleb Pollard MD  12/05/21 6383

## 2021-12-05 NOTE — ED NOTES
taking it in July. Patient states he took a Methadone today to see if that would help with his current symptoms. Patient denies using opiates. Per Review of Epic, patient reports adderall use with his last use being a week ago. Patient has been off his psychotropic medications for the last 6 months after issues with his provider at the [facility]    Patient states he has lost 80 lbs in the last 6 months. Patient was seen at [facility] ED two days ago in which patient had a fleeting suicidal thought, but was not admitted as he was not having current suicidal thoughts at the time of the ED visit. Patient denies homicidal ideation. Patient reports he was sick over two weeks ago and believes that may have lead to an increase in mental health symptoms. Level of Care Disposition:    Writer consulted with Dr. Yesenia Weiss who recommends inpatient psychiatric admission for safety and stabilization once medically cleared. Patient is in agreement and signed application for voluntary admission.

## 2021-12-05 NOTE — ED TRIAGE NOTES
Pt states SI stating feeling worthless. Pt states plan to drive car into another car stating he does not want to do that.

## 2021-12-06 PROBLEM — F41.1 GAD (GENERALIZED ANXIETY DISORDER): Status: ACTIVE | Noted: 2021-12-06

## 2021-12-06 PROBLEM — F31.64 BIPOLAR AFFECTIVE DISORDER, MIXED, SEVERE, WITH PSYCHOTIC BEHAVIOR (HCC): Status: ACTIVE | Noted: 2021-12-06

## 2021-12-06 PROBLEM — F19.10 POLYSUBSTANCE ABUSE (HCC): Status: ACTIVE | Noted: 2021-12-06

## 2021-12-06 PROCEDURE — 90792 PSYCH DIAG EVAL W/MED SRVCS: CPT | Performed by: PSYCHIATRY & NEUROLOGY

## 2021-12-06 PROCEDURE — 1240000000 HC EMOTIONAL WELLNESS R&B

## 2021-12-06 PROCEDURE — 6370000000 HC RX 637 (ALT 250 FOR IP): Performed by: PSYCHIATRY & NEUROLOGY

## 2021-12-06 PROCEDURE — 99254 IP/OBS CNSLTJ NEW/EST MOD 60: CPT | Performed by: INTERNAL MEDICINE

## 2021-12-06 RX ORDER — FLUTICASONE PROPIONATE 50 MCG
1 SPRAY, SUSPENSION (ML) NASAL DAILY
Status: DISCONTINUED | OUTPATIENT
Start: 2021-12-06 | End: 2021-12-10 | Stop reason: HOSPADM

## 2021-12-06 RX ORDER — RISPERIDONE 1 MG/1
0.5 TABLET, FILM COATED ORAL 2 TIMES DAILY
Status: DISCONTINUED | OUTPATIENT
Start: 2021-12-06 | End: 2021-12-10 | Stop reason: HOSPADM

## 2021-12-06 RX ORDER — AMLODIPINE BESYLATE 5 MG/1
5 TABLET ORAL DAILY
Status: DISCONTINUED | OUTPATIENT
Start: 2021-12-06 | End: 2021-12-10 | Stop reason: HOSPADM

## 2021-12-06 RX ORDER — PANTOPRAZOLE SODIUM 40 MG/1
40 TABLET, DELAYED RELEASE ORAL
Status: DISCONTINUED | OUTPATIENT
Start: 2021-12-06 | End: 2021-12-10 | Stop reason: HOSPADM

## 2021-12-06 RX ORDER — CETIRIZINE HYDROCHLORIDE 10 MG/1
10 TABLET ORAL DAILY
Status: DISCONTINUED | OUTPATIENT
Start: 2021-12-06 | End: 2021-12-10 | Stop reason: HOSPADM

## 2021-12-06 RX ORDER — ALBUTEROL SULFATE 90 UG/1
2 AEROSOL, METERED RESPIRATORY (INHALATION) EVERY 6 HOURS PRN
Status: DISCONTINUED | OUTPATIENT
Start: 2021-12-06 | End: 2021-12-10 | Stop reason: HOSPADM

## 2021-12-06 RX ORDER — ATORVASTATIN CALCIUM 20 MG/1
40 TABLET, FILM COATED ORAL NIGHTLY
Status: DISCONTINUED | OUTPATIENT
Start: 2021-12-06 | End: 2021-12-10 | Stop reason: HOSPADM

## 2021-12-06 RX ORDER — LISINOPRIL 20 MG/1
40 TABLET ORAL DAILY
Status: DISCONTINUED | OUTPATIENT
Start: 2021-12-07 | End: 2021-12-10 | Stop reason: HOSPADM

## 2021-12-06 RX ADMIN — NICOTINE POLACRILEX 2 MG: 2 GUM, CHEWING BUCCAL at 07:10

## 2021-12-06 RX ADMIN — RISPERIDONE 0.5 MG: 1 TABLET ORAL at 21:30

## 2021-12-06 RX ADMIN — NICOTINE POLACRILEX 2 MG: 2 GUM, CHEWING BUCCAL at 10:48

## 2021-12-06 RX ADMIN — ATORVASTATIN CALCIUM 40 MG: 20 TABLET, FILM COATED ORAL at 21:30

## 2021-12-06 RX ADMIN — NICOTINE POLACRILEX 2 MG: 2 GUM, CHEWING BUCCAL at 20:02

## 2021-12-06 RX ADMIN — Medication 1.5 MG: at 21:31

## 2021-12-06 NOTE — PLAN OF CARE
[facility]  Initial Interdisciplinary Treatment Plan NO      Original treatment plan Date & Time: 12/6/2021 0941    Admission Type:  Admission Type: Voluntary    Reason for admission:   Reason for Admission: suicidal ideations, increased depression and thoughts of self harm. Estimated Length of Stay:  5-7days  Estimated Discharge Date: to be determined by physician    PATIENT STRENGTHS:  Patient Strengths:Strengths: Communication, Connection to output provider  Patient Strengths and Limitations:Limitations: Inappropriate/potentially harmful leisure interests  Addictive Behavior: Addictive Behavior  In the past 3 months, have you felt or has someone told you that you have a problem with:  : None  Do you have a history of Chemical Use?: No  Do you have a history of Alcohol Use?: No  Do you have a history of Street Drug Abuse?: Yes  Histroy of Prescripton Drug Abuse?: No  Medical Problems:  Past Medical History:   Diagnosis Date    Bipolar disorder ([facility] [address]     Hypertension     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     Status EXAM:Status and Exam  Normal: Yes  Facial Expression: Worried  Affect: Appropriate  Level of Consciousness: Alert  Mood:Normal: No  Mood: Depressed, Anxious  Motor Activity:Normal: Yes  Interview Behavior: Cooperative  Preception: [place] to Person, Sherleen Mew to Time, [place] to Place, [place] to Situation  Attention:Normal: No  Attention: Distractible  Thought Processes: Circumstantial  Thought Content:Normal: No  Thought Content: Preoccupations  Hallucinations: None  Delusions: No  Memory:Normal: Yes  Insight and Judgment: No  Insight and Judgment: Poor Insight  Present Suicidal Ideation: Yes (contracts for safety.  feels safe on unit)  Present Homicidal Ideation: No    EDUCATION:   Learner Progress Toward Treatment Goals: reviewed group plans and strategies for care    Method:group therapy, medication compliance, individualized assessments and care planning    Outcome: needs reinforcement    PATIENT GOALS: to be discussed with patient within 72 hours    PLAN/TREATMENT RECOMMENDATIONS:     continue group therapy , medications compliance, goal setting, individualized assessments and care, continue to monitor pt on unit      SHORT-TERM GOALS:   Time frame for Short-Term Goals: 5-7 days    LONG-TERM GOALS:  Time frame for Long-Term Goals: 6 months  Members Present in Team Meeting: See Signature [address]

## 2021-12-06 NOTE — CONSULTS
[facility]    CONSULTATION / HISTORY AND PHYSICAL EXAMINATION            Date:   12/6/2021  Patient name:  Amy Bhakta  Date of admission:  12/5/2021  2:20 PM  MRN:   [id number]  Account:  [de-identified]  YOB: 1965  PCP:    Frannie Alexis MD  Room:   [facility]0125-01  Code Status:    Full Code    Physician Requesting Consult: Christo Oconnor MD    Reason for Consult:  medical management    Chief Complaint:     Chief Complaint   Patient presents with    Suicidal       History Obtained From:     Patient medical record nursing staff    History of Present Illness:   Patient is not a very good historian, very lethargic  He was admitted to [facility] floor with generalized anxiety disorder, depression  Entered medicine consulted for management of high blood pressure  Patient has history of hypertension for long period of time, unfortunately he is not taking any medication for blood pressure control for last 6 months  Does not check blood pressure at home  No complaints of chest pain, shortness of breath, abdominal pain, palpitation      Past Medical History:     Past Medical History:   Diagnosis Date    Bipolar disorder ([facility] [address]     Hypertension     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 No pertinent surgical history. Medications Prior to Admission:     Prior to Admission medications    Medication Sig Start Date End Date Taking?  Authorizing Provider   amLODIPine (NORVASC) 5 MG tablet take 1 tablet by mouth once daily 4/4/21  Yes Bruna aCrvajal MD   omeprazole (PRILOSEC) 20 MG delayed release capsule take 1 capsule by mouth once daily WITH BREAKFAST 4/4/21  Yes Bruna Carvajal MD   atorvastatin (LIPITOR) 40 MG tablet take 1 tablet by mouth once daily  Patient taking differently: 40 mg daily  3/3/21  Yes Holly Lamas MD   lisinopril (PRINIVIL;ZESTRIL) 40 MG tablet take 1 tablet by mouth once daily 11/12/20  Yes Dionicio Rashid MD   fluticasone (FLONASE) 50 MCG/ACT nasal spray instill 1 spray into each nostril once daily 5/14/20  Yes Dionicio Rashid MD   metoprolol tartrate (LOPRESSOR) 25 MG tablet Take 1 tablet by mouth 2 times daily 5/14/20  Yes Toya Sanabria MD   VENTOLIN  (90 Base) MCG/ACT inhaler Inhale 2 puffs into the lungs every 6 hours as needed for Wheezing 5/14/20  Yes Dionicio Rashid MD   gabapentin (NEURONTIN) 800 MG tablet Take 1 tablet by mouth three times daily. . 6/13/18  Yes Historical Provider, MD   gabapentin (NEURONTIN) 300 MG capsule Take 1 capsule by mouth nightly. . 6/13/18  Yes Historical Provider, MD   TRINTELLIX 10 MG TABS tablet Take 3 tablets by mouth daily 6/4/18  Yes Historical Provider, MD   tiotropium (SPIRIVA HANDIHALER) 18 MCG inhalation capsule Inhale 1 capsule into the lungs daily 5/14/20   Dionicio Rashid MD   cetirizine (ZYRTEC) 10 MG tablet Take 1 tablet by mouth daily 5/1/20   Bryn Parr MD   buprenorphine-naloxone (SUBOXONE) 8-2 MG FILM SL film  11/14/19   Historical Provider, MD   Buprenorphine HCl-Naloxone HCl (SUBOXONE SL) Take 8 mg by mouth     Historical Provider, MD   ondansetron (ZOFRAN) 4 MG tablet Take 1 tablet by mouth daily as needed for Nausea or Vomiting 8/15/18   Grecia Mobley MD   buPROPion (WELLBUTRIN XL) 300 MG extended release tablet Take 300 mg by mouth every morning    Historical Provider, MD   OXcarbazepine (TRILEPTAL) 600 MG tablet Take 600 mg by mouth 2 times daily    Historical Provider, MD        Allergies:     Skelaxin [metaxalone] and Trazodone    Social History:     Tobacco:    reports that he has been smoking pipe and cigarettes. He started smoking about 19 years ago. He has a 33.00 pack-year smoking history. He has never used smokeless tobacco.  Alcohol:      reports previous alcohol use. Drug Use:  reports previous drug use. Drug: Cocaine.     Family History:     Family History   Problem Relation Age of Onset    Bipolar Disorder Mother     Alcohol Abuse Mother         Liver cirrhosis    Coronary Art Dis Brother     Hypertension Other         Sibling    Seizures Other         Siblings    Hypertension Father     Stroke Father        Review of Systems:     Positive and Negative as described in HPI. CONSTITUTIONAL:  negative for fevers, chills, sweats, fatigue, weight loss  HEENT:  negative for vision, hearing changes, runny nose, throat pain  RESPIRATORY:  negative for shortness of breath, cough, congestion, wheezing. CARDIOVASCULAR:  negative for chest pain, palpitations. GASTROINTESTINAL:  negative for nausea, vomiting, diarrhea, constipation, change in bowel habits, abdominal pain   GENITOURINARY:  negative for difficulty of urination, burning with urination, frequency   INTEGUMENT:  negative for rash, skin lesions, easy bruising   HEMATOLOGIC/LYMPHATIC:  negative for swelling/edema   ALLERGIC/IMMUNOLOGIC:  negative for urticaria , itching  ENDOCRINE:  negative increase in drinking, increase in urination, hot or cold intolerance  MUSCULOSKELETAL:  negative joint pains, muscle aches, swelling of joints  NEUROLOGICAL:  negative for headaches, dizziness, lightheadedness, numbness, pain, tingling extremities  BEHAVIOR/PSYCH: Lethargic    Physical Exam:     BP (!) 160/78   Pulse 73   Temp 97.8 °F (36.6 °C) (Oral)   Resp 14   Ht 5' 10\" (1.778 m)   Wt 173 lb (78.5 kg)   SpO2 95%   BMI 24.82 kg/m²   Temp (24hrs), Av.8 °F (36.6 °C), Min:97.8 °F (36.6 °C), Max:97.8 °F (36.6 °C)    No results for input(s): POCGLU in the last 72 hours. No intake or output data in the 24 hours ending 21 1535    General Appearance: Very lethargic, sleepy  Mental status: oriented to person, place, and time with normal affect  Head:  normocephalic, atraumatic.   Eye: no icterus, redness, pupils equal and reactive, extraocular eye movements intact, conjunctiva clear  Ear: normal external ear, no discharge, hearing intact  Nose:  no Differential    Collection Time: 12/05/21  3:42 PM   Result Value Ref Range    WBC 12.1 (H) 3.5 - 11.0 k/uL    RBC 4.57 4.5 - 5.9 m/uL    Hemoglobin 14.1 13.5 - 17.5 g/dL    Hematocrit 41.7 41 - 53 %    MCV 91.2 80 - 100 fL    MCH 30.9 26 - 34 pg    MCHC 33.9 31 - 37 g/dL    RDW 13.9 11.5 - 14.9 %    Platelets 449 199 - 340 k/uL    MPV 6.5 6.0 - 12.0 fL    NRBC Automated NOT REPORTED per 100 WBC    Differential Type NOT REPORTED     Seg Neutrophils 72 (H) 36 - 66 %    Lymphocytes 18 (L) 24 - 44 %    Monocytes 7 1 - 7 %    Eosinophils % 1 0 - 4 %    Basophils 2 0 - 2 %    Immature Granulocytes NOT REPORTED 0 %    Segs Absolute 8.80 1.3 - 9.1 k/uL    Absolute Lymph # 2.20 1.0 - 4.8 k/uL    Absolute Mono # 0.80 0.1 - 1.3 k/uL    Absolute Eos # 0.10 0.0 - 0.4 k/uL    Basophils Absolute 0.20 0.0 - 0.2 k/uL    Absolute Immature Granulocyte NOT REPORTED 0.00 - 0.30 k/uL    WBC Morphology NOT REPORTED     RBC Morphology NOT REPORTED     Platelet Estimate NOT REPORTED    Comprehensive Metabolic Panel    Collection Time: 12/05/21  3:42 PM   Result Value Ref Range    Glucose 111 (H) 70 - 99 mg/dL    BUN 16 6 - 20 mg/dL    CREATININE 0.66 (L) 0.70 - 1.20 mg/dL    Bun/Cre Ratio NOT REPORTED 9 - 20    Calcium 10.2 8.6 - 10.4 mg/dL    Sodium 135 135 - 144 mmol/L    Potassium 4.1 3.7 - 5.3 mmol/L    Chloride 97 (L) 98 - 107 mmol/L    CO2 28 20 - 31 mmol/L    Anion Gap 10 9 - 17 mmol/L    Alkaline Phosphatase 60 40 - 129 U/L    ALT 9 5 - 41 U/L    AST 13 <40 U/L    Total Bilirubin 0.42 0.3 - 1.2 mg/dL    Total Protein 7.1 6.4 - 8.3 g/dL    Albumin 4.7 3.5 - 5.2 g/dL    Albumin/Globulin Ratio NOT REPORTED 1.0 - 2.5    GFR Non-African American >60 >60 mL/min    GFR African American >60 >60 mL/min    GFR Comment          GFR Staging NOT REPORTED    Ethanol    Collection Time: 12/05/21  3:42 PM   Result Value Ref Range    Ethanol <10 <10 mg/dL    Ethanol percent <0.010 %           Consultations:   IP CONSULT TO INTERNAL MEDICINE  IP CONSULT TO INTERNAL MEDICINE  Assessment :      Primary Problem  Bipolar affective disorder, mixed, severe, with psychotic behavior [facility])    Active Hospital Problems    Diagnosis Date Noted    Bipolar affective disorder, mixed, severe, with [facility] [F31.64] 12/06/2021    Polysubstance abuse ([facility] [F19.10] 12/06/2021    ELIZA (generalized anxiety disorder) [F41.1] 12/06/2021       Plan:     1. Hypertension, uncontrolled due to noncompliance with medication  2. Patient supposed to be taking Norvasc, lisinopril, Lopressor. Since patient is not taking any antihypertensive for last 6 months, will start patient on Norvasc and lisinopril for now, will monitor blood pressure closely if blood pressure goes high will add Lopressor  3. History of COPD, compensated, on Spiriva inhaler, albuterol as needed  4. GERD, restarted home dose of Protonix  5. We will follow        Gerrianne Cranker, MD  12/6/2021  3:35 PM    Copy sent to Dr. Randal Do MD    Please note that this chart was generated using voice recognition Dragon dictation software. Although every effort was made to ensure the accuracy of this automated transcription, some errors in transcription may have occurred.

## 2021-12-06 NOTE — BH NOTE
Best Practice Advisory suggested to place patient on a Suicide Precautions. However,  pt currently does not meet criteria to be on a constant observation precaution. Pt denies any suicidal ideation at this time, and states he feels safe on the unit. On Call provider notified and ordered to discontinue the Suicide Precautions. Will continue to observe patient on every 15 minute checks.

## 2021-12-06 NOTE — PROGRESS NOTES
Comprehensive Nutrition Assessment    Type and Reason for Visit:  Initial, Positive Nutrition Screen (wt loss, poor appetite)    Nutrition Recommendations/Plan:   Will continue to provide Regular diet and add Ensure High Protein supplements to all trays    Nutrition Assessment:  Pt was admitted to [facility] due to suicidal ideation. Unable to verify stated wt loss of 80# over the past 6 monthsdue to no documented wt history available. Pt was sick for about 2 weeks prior to admit but is now coming to the dining room for meals and eating adequate amounts. Malnutrition Assessment:  Malnutrition Status: At risk for malnutrition (Comment)    Context:  Chronic Illness     Findings of the 6 clinical characteristics of malnutrition:  Energy Intake:  Mild decrease in energy intake (Comment)  Weight Loss:  Unable to assess     Body Fat Loss:  Unable to assess     Muscle Mass Loss:  Unable to assess    Fluid Accumulation:  No significant fluid accumulation     Strength:  Not Performed    Estimated Daily Nutrient Needs:  Energy (kcal): [place] x 1.2= 2000 kcal; Weight Used for Energy Requirements:  Admission     Protein (g):  1.3g/kg=  g; Weight Used for Protein Requirements:             Nutrition Related Findings:  no edema, Labs/Meds: Reviewed, PMH: preDM, IBS      Wounds:  None       Current Nutrition Therapies:    ADULT DIET; Regular  ADULT ORAL NUTRITION SUPPLEMENT; Breakfast, Lunch, Dinner; Low Calorie/High Protein Oral Supplement    Anthropometric Measures:  · Height: 5' 10\" (177.8 cm)  · Current Body Weight: 173 lb (78.5 kg)   · Admission Body Weight: 173 lb (78.5 kg)    · Ideal Body Weight: 166 lbs; BMI: 24.8  · BMI Categories: Normal Weight (BMI 18.5-24. 9)       Nutrition Diagnosis:   · Predicted inadequate energy intake related to psychological cause or life stress as evidenced by poor intake prior to admission    Nutrition Interventions:   Food and/or Nutrient Delivery:  Continue Current Diet, Start Oral Nutrition Supplement  Nutrition Education/Counseling:  No recommendation at this time   Coordination of Nutrition Care:  Continue to monitor while inpatient    Goals:  po intake greater than 50%       Nutrition Monitoring and Evaluation:   Food/Nutrient Intake Outcomes:  Food and Nutrient Intake, Supplement Intake  Physical Signs/Symptoms Outcomes:  Biochemical Data, GI Status, Skin, Weight, Fluid Status or Edema     Discharge Planning:    Continue current diet     Electronically signed by Pinky Lemus RD, LD on 12/6/21 at 2:21 PM EST    Contact: [phone]

## 2021-12-06 NOTE — GROUP NOTE
Group Therapy Note    Date: 12/6/2021    Group Start Time: 1000  Group End Time: 9348  Group Topic: Psychotherapy    [address] [facility], [place], [place]        Group Therapy Note    Attendees: 7/20       Patient refused to attend psychotherapy group at 10:00 am after encouragement from staff.   1:1 talk time provided as alternative to group session      Discipline Responsible: /Counselor      Signature:  JAIMEE Chong, CAROLEE
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
## 2021-12-06 NOTE — BH NOTE
`Behavioral [facility]  Admission Note     Admission Type:   Admission Type: Voluntary    Reason for admission:  Reason for Admission: suicidal ideations, increased depression and thoughts of self harm. PATIENT STRENGTHS:  Strengths: Communication, Connection to output provider    Patient Strengths and Limitations:  Limitations: Inappropriate/potentially harmful leisure interests    Addictive Behavior:   Addictive Behavior  In the past 3 months, have you felt or has someone told you that you have a problem with:  : None  Do you have a history of Chemical Use?: No  Do you have a history of Alcohol Use?: No  Do you have a history of Street Drug Abuse?: Yes  Histroy of Prescripton Drug Abuse?: No    Medical Problems:   Past Medical History:   Diagnosis Date    Bipolar disorder ([facility] [address]     Hypertension     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       Status EXAM:  Status and Exam  Normal: Yes  Facial Expression: Worried  Affect: Appropriate  Level of Consciousness: Alert  Mood:Normal: No  Mood: Depressed, Anxious  Motor Activity:Normal: Yes  Interview Behavior: Cooperative  Preception: Franklin to Person, Daralyn Heckle to Time, [place] to Place, [place] to Situation  Attention:Normal: No  Attention: Distractible  Thought Processes: Circumstantial  Thought Content:Normal: No  Thought Content: Preoccupations  Hallucinations: None  Delusions: No  Memory:Normal: Yes  Insight and Judgment: No  Insight and Judgment: Poor Insight  Present Suicidal Ideation: Yes (contracts for safety.  feels safe on unit)  Present Homicidal Ideation: No    Tobacco Screening:  Practical Counseling, on admission, ray X, if applicable and completed (first 3 are required if patient doesn't refuse):            ( )  Recognizing danger situations (included triggers and roadblocks)                    ( )  Coping skills (new ways to manage stress, exercise, relaxation techniques, changing routine, distraction)

## 2021-12-06 NOTE — BH NOTE
In-patient Pharmacy to Re-time medication administration due to patient not being available: sleeping

## 2021-12-06 NOTE — GROUP NOTE
Group Therapy Note    Date: 12/6/2021    Group Start Time: 1100  Group End Time: 4024  Group Topic: Psychoeducation    CHARLES HOPKINS    Nidhi Renae        Group Therapy Note    Attendees: 11/19         Patient's Goal:  To demonstrate improved interpersonal skills     Notes:   Patient was pleasant and appropriate throughout the session     Status After Intervention:  Improved    Participation Level:  Active Listener and Interactive    Participation Quality: Appropriate, Attentive, Sharing and Supportive      Speech:  normal      Thought Process/Content: Logical      Affective Functioning: Congruent      Mood: euthymic      Level of consciousness:  Alert, Oriented x4 and Attentive      Response to Learning: Able to verbalize current knowledge/experience, Able to verbalize/acknowledge new learning, Capable of insight and Progressing to goal      Endings: None Reported    Modes of Intervention: Education, Support, Socialization, Exploration, Clarifying and Problem-solving      Discipline Responsible: Psychoeducational Specialist      Signature:  Carmel Jessica

## 2021-12-06 NOTE — BH NOTE
Patient given tobacco quitline number 62685262780 at this time, refusing to call at this time, states \" I just dont want to quit now\"- patient given information as to the dangers of long term tobacco use. Continue to reinforce the importance of tobacco cessation.

## 2021-12-06 NOTE — GROUP NOTE
Group Therapy Note    Date: 12/6/2021    Group Start Time: 1330  Group End Time: 1688  Group Topic: Psychoeducation    CHARLES Renae      Patient declined to attend coping skills group at 1330 despite encouragement from staff. 1:1 talk time offered by staff as alternative to group session.         Signature:  Karthikeyan Lance

## 2021-12-06 NOTE — PROGRESS NOTES
Behavioral Services  Medicare Certification Upon Admission    I certify that this patient's inpatient psychiatric hospital admission is medically necessary for:    [x] (1) Treatment which could reasonably be expected to improve this patient's condition,       [x] (2) Or for diagnostic study;     AND     [x](2) The inpatient psychiatric services are provided while the individual is under the care of a physician and are included in the individualized plan of care.     Estimated length of stay/service 4-7 days    Plan for post-hospital care home with outpatient [facility] f/u    Electronically signed by Ina Benz MD on 12/6/2021 at 12:25 PM

## 2021-12-06 NOTE — PLAN OF CARE
Problem: Altered Mood, Depressive Behavior:  Goal: Able to verbalize and/or display a decrease in depressive symptoms  Description: Able to verbalize and/or display a decrease in depressive symptoms  12/6/2021 1358 by Lisa Mcfarland RN  Outcome: Ongoing  Patient has been cooperative upon approach, has allowed all assessments; Patient has expressed reality-based thinking with peers and staff; Patient reports a decrease in depressive symptoms  Problem: Altered Mood, Depressive Behavior:  Goal: Ability to disclose and discuss suicidal ideas will improve  Description: Ability to disclose and discuss suicidal ideas will improve  12/6/2021 1358 by Lisa Mcfarland RN  Outcome: Ongoing  Patient has been cooperative upon approach, has allowed all assessments; Patient has expressed reality-based thinking with peers and staff; Patient denies suicidal-homicidal ideation at this time. Problem: Altered Mood, Depressive Behavior:  Goal: Absence of self-harm  Description: Absence of self-harm  12/6/2021 1358 by Lisa Mcfarland RN  Outcome: Ongoing   Patient has been cooperative upon approach, has allowed all assessments; Patient has expressed reality-based thinking with peers and staff; Patient remains absent of self-harm  Problem: Altered Mood, Manic Behavior:  Goal: Ability to achieve adequate nutritional intake will improve  Description: Ability to achieve adequate nutritional intake will improve  12/6/2021 1358 by Lisa Mcfarland RN  Outcome: Ongoing   Patient has been cooperative upon approach, has allowed all assessments;  Patient has expressed reality-based thinking with peers and staff; Patient maintains adequate nutritional intake as evidenced by eating 75%+ of meal trays and ALL snacks  Problem: Tobacco Use:  Goal: Inpatient tobacco use cessation counseling participation  Description: Inpatient tobacco use cessation counseling participation  12/6/2021 1358 by Lisa Mcfarland RN  Outcome: Ongoing   Patient is enrolled in inpatient tobacco use cessation counseling but refuses participation at this time.   Problem: Suicide risk  Goal: Provide patient with safe environment  Description: Provide patient with safe environment  12/6/2021 1358 by Kayy Swain RN  Outcome: Ongoing   Patient is provided with a safe environment: locked unit, ALL weapons/sharps collected/confiscated; observed q 15 minutes for safety

## 2021-12-07 PROCEDURE — 99232 SBSQ HOSP IP/OBS MODERATE 35: CPT | Performed by: PSYCHIATRY & NEUROLOGY

## 2021-12-07 PROCEDURE — 6370000000 HC RX 637 (ALT 250 FOR IP): Performed by: PSYCHIATRY & NEUROLOGY

## 2021-12-07 PROCEDURE — 1240000000 HC EMOTIONAL WELLNESS R&B

## 2021-12-07 PROCEDURE — 99232 SBSQ HOSP IP/OBS MODERATE 35: CPT | Performed by: INTERNAL MEDICINE

## 2021-12-07 PROCEDURE — 6370000000 HC RX 637 (ALT 250 FOR IP): Performed by: INTERNAL MEDICINE

## 2021-12-07 PROCEDURE — 90833 PSYTX W PT W E/M 30 MIN: CPT | Performed by: PSYCHIATRY & NEUROLOGY

## 2021-12-07 RX ADMIN — RISPERIDONE 0.5 MG: 1 TABLET ORAL at 08:58

## 2021-12-07 RX ADMIN — PANTOPRAZOLE SODIUM 40 MG: 40 TABLET, DELAYED RELEASE ORAL at 08:58

## 2021-12-07 RX ADMIN — AMLODIPINE BESYLATE 5 MG: 5 TABLET ORAL at 08:58

## 2021-12-07 RX ADMIN — NICOTINE POLACRILEX 2 MG: 2 GUM, CHEWING BUCCAL at 12:30

## 2021-12-07 RX ADMIN — RISPERIDONE 0.5 MG: 1 TABLET ORAL at 21:25

## 2021-12-07 RX ADMIN — HYDROXYZINE HYDROCHLORIDE 50 MG: 50 TABLET, FILM COATED ORAL at 21:26

## 2021-12-07 RX ADMIN — FLUTICASONE PROPIONATE 1 SPRAY: 50 SPRAY, METERED NASAL at 08:58

## 2021-12-07 RX ADMIN — ATORVASTATIN CALCIUM 40 MG: 20 TABLET, FILM COATED ORAL at 21:27

## 2021-12-07 RX ADMIN — NICOTINE POLACRILEX 2 MG: 2 GUM, CHEWING BUCCAL at 15:45

## 2021-12-07 RX ADMIN — Medication 1.5 MG: at 21:25

## 2021-12-07 RX ADMIN — LISINOPRIL 40 MG: 20 TABLET ORAL at 08:58

## 2021-12-07 NOTE — PLAN OF CARE
5 [facility]  Day 3 Interdisciplinary Treatment Plan NOTE    Review Date & Time: 12/7/2021 1308    Admission Type:   Admission Type: Voluntary    Reason for admission:  Reason for Admission: suicidal ideations, increased depression and thoughts of self harm.   Estimated Length of Stay: 5-7 days  Estimated Discharge Date Update: to be determined by physician    PATIENT STRENGTHS:  Patient Strengths Strengths: Communication, Connection to output provider  Patient Strengths and Limitations:Limitations: Difficulty problem solving/relies on others to help solve problems, Tendency to isolate self, Multiple barriers to leisure interests  Addictive Behavior:Addictive Behavior  In the past 3 months, have you felt or has someone told you that you have a problem with:  : None  Do you have a history of Chemical Use?: No  Do you have a history of Alcohol Use?: No  Do you have a history of Street Drug Abuse?: Yes  Histroy of Prescripton Drug Abuse?: No  Medical Problems:  Past Medical History:   Diagnosis Date    Bipolar disorder ([facility] [address]     Hypertension     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       Risk:  Fall RiskTotal: 61  Nick Scale Nick Scale Score: 22  BVC    Change in scores no Changes to plan of Care no    Status EXAM:   Status and Exam  Normal: No  Facial Expression: Flat  Affect: Appropriate  Level of Consciousness: Alert  Mood:Normal: No  Mood: Depressed, Anxious  Motor Activity:Normal: No  Motor Activity: Decreased  Interview Behavior: Cooperative, Evasive  Preception: [place] to Person, [place] to Time, [place] to Place, [place] to Situation  Attention:Normal: No  Attention: Distractible, Unable to Concentrate  Thought Processes: Blocking  Thought Content:Normal: No  Thought Content: Poverty of Content  Hallucinations: None  Delusions: No  Memory:Normal: Yes  Insight and Judgment: No  Insight and Judgment: Poor Judgment, Poor Insight, Unmotivated  Present Suicidal Ideation: No  Present Homicidal Ideation: No    Daily Assessment Last Entry:   Daily Sleep (WDL): Within Defined Limits         Patient Currently in Pain: Denies  Daily Nutrition (WDL): Within Defined Limits    Patient Monitoring:  Frequency of Checks: 4 times per hour, close    Psychiatric Symptoms:   Depression Symptoms  Depression Symptoms: Isolative, Loss of interest  Anxiety Symptoms  Anxiety Symptoms: Generalized  Keily Symptoms  Keily Symptoms: No problems reported or observed. Psychosis Symptoms  Delusion Type: No problems reported or observed. Suicide Risk CSSR-S:  1) Within the past month, have you wished you were dead or wished you could go to sleep and not wake up? : Yes  2) Have you actually had any thoughts of killing yourself? : Yes  3) Have you been thinking about how you might kill yourself? : Yes  5) Have you started to work out or worked out the details of how to kill yourself?  Do you intend to carry out this plan? : Yes  6) Have you ever done anything, started to do anything, or prepared to do anything to end your life?: Yes  Change in Result  NA  Change in Plan of care  NA       EDUCATION:   EDUCATION:   Learner Progress Toward Treatment Goals: Reviewed results and recommendations of this team, Reviewed group plan and strategies, Reviewed signs, symptoms and risk of self harm and violent behavior, Reviewed goals and plan of care    Method:small group, individual verbal education    Outcome:verbalized by patient, but needs reinforcement to obtain goals    PATIENT GOALS:    Short term: Patient stated catch up on sleep     Long term: Patient stated get linked with a new [facility], stay on medications     PLAN/TREATMENT RECOMMENDATIONS UPDATE: continue with group therapies, increased socialization, continue planning for after discharge goals, continue with medication compliance    SHORT-TERM GOALS UPDATE:   Time frame for Short-Term Goals: 5-7 days    LONG-TERM GOALS UPDATE:   Time frame for Long-Term Goals: 6 months  Members Present in Team Meeting: See Signature Sheet    Dana Martinez

## 2021-12-07 NOTE — PROGRESS NOTES
Daily Progress Note  12/7/2021    Patient Name: Tyler Hardy    CHIEF COMPLAINT: Depression with suicidal ideation         SUBJECTIVE:      Patient is seen today for a follow up assessment. Kaity Salinas is interviewed today bedside, he is easily arousable to verbal stimulus. He reports that he has been out of bed a little bit today though denies attending any group activity. He continues to endorse depressed feelings. He was compliant with his medications last night and this morning. He denies any significant improvement since taking the medications though reports that he is tolerating them well. He has had some improvement with regards to racing thoughts and feels safe on the inpatient unit. He denies any significant withdrawal symptoms and has not been receiving as needed medication for opiate withdrawal.  He continues to verbally contract for safety and was encouraged to attend afternoon group. Appetite:  [x] Adequate/Unchanged  [] Increased  [] Decreased      Sleep:       [x] Adequate/Unchanged  [] Fair  [] Poor      Group Attendance on Unit:   [] Yes  [] Selectively    [x] No    Medication Side Effects: Denies         Mental Status Exam  Level of consciousness: Alert and awake   Appearance: Appropriate attire for setting, resting in bed, with fair  grooming and hygiene   Behavior/Motor: Approachable, isolating, out of bed for needs only  Attitude toward examiner: Mostly cooperative, attentive, fair eye contact  Speech: Normal rate, volume and tone  Mood: Depressed  Affect: Congruent  Thought processes: Linear and coherent  Thought content: Denies homicidal ideation  Suicidal Ideation: Reports improvement in suicidal ideations, contracts for safety on the unit. Delusions: No evidence of delusions. Perceptual Disturbance: Reports improvement in auditory hallucinations, patient does not appear to be responding to internal stimuli.    Cognition: Oriented to self, location, time, and situation  Memory: intact  Insight: fair   Judgement: poor       Data   height is 5' 10\" (1.778 m) and weight is 173 lb (78.5 kg). His temporal temperature is 97.3 °F (36.3 °C). His blood pressure is 118/69 and his pulse is 60. His respiration is 14 and oxygen saturation is 95%.    Labs:   Admission on 12/05/2021   Component Date Value Ref Range Status    WBC 12/05/2021 12.1* 3.5 - 11.0 k/uL Final    RBC 12/05/2021 4.57  4.5 - 5.9 m/uL Final    Hemoglobin 12/05/2021 14.1  13.5 - 17.5 g/dL Final    Hematocrit 12/05/2021 41.7  41 - 53 % Final    MCV 12/05/2021 91.2  80 - 100 fL Final    MCH 12/05/2021 30.9  26 - 34 pg Final    MCHC 12/05/2021 33.9  31 - 37 g/dL Final    RDW 12/05/2021 13.9  11.5 - 14.9 % Final    Platelets 22/81/3216 317  150 - 450 k/uL Final    MPV 12/05/2021 6.5  6.0 - 12.0 fL Final    NRBC Automated 12/05/2021 NOT REPORTED  per 100 WBC Final    Differential Type 12/05/2021 NOT REPORTED   Final    Seg Neutrophils 12/05/2021 72* 36 - 66 % Final    Lymphocytes 12/05/2021 18* 24 - 44 % Final    Monocytes 12/05/2021 7  1 - 7 % Final    Eosinophils % 12/05/2021 1  0 - 4 % Final    Basophils 12/05/2021 2  0 - 2 % Final    Immature Granulocytes 12/05/2021 NOT REPORTED  0 % Final    Segs Absolute 12/05/2021 8.80  1.3 - 9.1 k/uL Final    Absolute Lymph # 12/05/2021 2.20  1.0 - 4.8 k/uL Final    Absolute Mono # 12/05/2021 0.80  0.1 - 1.3 k/uL Final    Absolute Eos # 12/05/2021 0.10  0.0 - 0.4 k/uL Final    Basophils Absolute 12/05/2021 0.20  0.0 - 0.2 k/uL Final    Absolute Immature Granulocyte 12/05/2021 NOT REPORTED  0.00 - 0.30 k/uL Final    WBC Morphology 12/05/2021 NOT REPORTED   Final    RBC Morphology 12/05/2021 NOT REPORTED   Final    Platelet Estimate 87/28/7152 NOT REPORTED   Final    Glucose 12/05/2021 111* 70 - 99 mg/dL Final    BUN 12/05/2021 16  6 - 20 mg/dL Final    CREATININE 12/05/2021 0.66* 0.70 - 1.20 mg/dL Final    Bun/Cre Ratio 12/05/2021 NOT REPORTED  9 - 20 Final    Calcium 12/05/2021 10.2  8.6 - 10.4 mg/dL Final    Sodium 12/05/2021 135  135 - 144 mmol/L Final    Potassium 12/05/2021 4.1  3.7 - 5.3 mmol/L Final    Chloride 12/05/2021 97* 98 - 107 mmol/L Final    CO2 12/05/2021 28  20 - 31 mmol/L Final    Anion Gap 12/05/2021 10  9 - 17 mmol/L Final    Alkaline Phosphatase 12/05/2021 60  40 - 129 U/L Final    ALT 12/05/2021 9  5 - 41 U/L Final    AST 12/05/2021 13  <40 U/L Final    Total Bilirubin 12/05/2021 0.42  0.3 - 1.2 mg/dL Final    Total Protein 12/05/2021 7.1  6.4 - 8.3 g/dL Final    Albumin 12/05/2021 4.7  3.5 - 5.2 g/dL Final    Albumin/Globulin Ratio 12/05/2021 NOT REPORTED  1.0 - 2.5 Final    GFR Non- 12/05/2021 >60  >60 mL/min Final    GFR  12/05/2021 >60  >60 mL/min Final    GFR Comment 12/05/2021        Final    Comment: Average GFR for 52-63 years old:   80 mL/min/1.73sq m  Chronic Kidney Disease:   <60 mL/min/1.73sq m  Kidney failure:   <15 mL/min/1.73sq m              eGFR calculated using average adult body mass. Additional eGFR calculator available at:        [employer]            GFR Staging 12/05/2021 NOT REPORTED   Final    Specimen Description 12/05/2021 . NASOPHARYNGEAL SWAB   Final    SARS-CoV-2, Rapid 12/05/2021 Not Detected  Not Detected Final    Comment:       Rapid NAAT:  The specimen is NEGATIVE for SARS-CoV-2, the novel coronavirus associated with   COVID-19. The ID NOW COVID-19 assay is designed to detect the virus that causes COVID-19 in patients   with signs and symptoms of infection who are suspected of COVID-19. An individual without symptoms of COVID-19 and who is not shedding SARS-CoV-2 virus would   expect to have a negative (not detected) result in this assay.   Negative results should be treated as presumptive and, if inconsistent with clinical signs   and symptoms or necessary for patient management,  should be tested with an alternative molecular assay. Negative results do not preclude   SARS-CoV-2 infection and   should not be used as the sole basis for patient management decisions. Fact sheet for Healthcare Providers: [employer]  Fact sheet for Patients: [employer]          Methodology: Isothermal Nucleic Acid Amplification      Ethanol 12/05/2021 <10  <10 mg/dL Final    Ethanol percent 12/05/2021 <0.010  % Final    Amphetamine Screen, Ur 12/05/2021 NEGATIVE  NEGATIVE Final    Comment:       (Positive cutoff 1000 ng/mL)                  Barbiturate Screen, Ur 12/05/2021 NEGATIVE  NEGATIVE Final    Comment:       (Positive cutoff 200 ng/mL)                  Benzodiazepine Screen, Urine 12/05/2021 NEGATIVE  NEGATIVE Final    Comment:       (Positive cutoff 200 ng/mL)                  Cocaine Metabolite, Urine 12/05/2021 NEGATIVE  NEGATIVE Final    Comment:       (Positive cutoff 300 ng/mL)                  Methadone Screen, Urine 12/05/2021 POSITIVE* NEGATIVE Final    Comment:       (Positive cutoff 300 ng/mL)                  Opiates, Urine 12/05/2021 NEGATIVE  NEGATIVE Final    Comment:       (Positive cutoff 300 ng/mL)                  Phencyclidine, Urine 12/05/2021 NEGATIVE  NEGATIVE Final    Comment:       (Positive cutoff 25 ng/mL)                  Propoxyphene, Urine 12/05/2021 NOT REPORTED  NEGATIVE Final    Cannabinoid Scrn, Ur 12/05/2021 POSITIVE* NEGATIVE Final    Comment:       (Positive cutoff 50 ng/mL)                  Oxycodone Screen, Ur 12/05/2021 NEGATIVE  NEGATIVE Final    Comment:       (Positive cutoff 100 ng/mL)                  Methamphetamine, Urine 12/05/2021 NOT REPORTED  NEGATIVE Final    Tricyclic Antidepressants, Urine 12/05/2021 NOT REPORTED  NEGATIVE Final    MDMA, Urine 12/05/2021 NOT REPORTED  NEGATIVE Final    Buprenorphine Urine 12/05/2021 NOT REPORTED  NEGATIVE Final    Test Information 12/05/2021 Assay provides medical screening only. 0.5 mL, 0.5 mL, IntraMUSCular, Prior to discharge  melatonin tablet 1.5 mg, 1.5 mg, Oral, Nightly PRN    ASSESSMENT  Bipolar affective disorder, mixed, severe, with psychotic behavior ([facility] [address]         PLAN  Patient symptoms are: Minimally improved  Continue current medication regimen and observe  May benefit from further titration of risperidone  Monitor need and frequency of PRN medications. Encourage participation in groups and milieu. Attempt to develop insight. Psycho-education conducted. Supportive Therapy conducted. Probable discharge is per attending physician. Follow-up daily while inpatient. Patient continues to be monitored in the inpatient psychiatric facility at [facility] for safety and stabilization. Patient continues to need, on a daily basis, active treatment furnished directly by or requiring the supervision of inpatient psychiatric personnel. Electronically signed by JORGE Mixon CNP on 12/7/2021 at 5:28 PM    **This report has been created using voice recognition software. It may contain minor errors which are inherent in voice recognition technology. **    I independently saw and evaluated the patient. I reviewed the nurse practitioners documentation above. Any additional comments or changes to the nurse practitioners documentation are stated below otherwise agree with assessment. Plan will be as follows:  Spent 30 minutes with the patient, of that greater than 16 minutes was spent in supportive psychotherapy. Patient denying auditory or visual hallucinations. Reports tolerating medication well. Discussed continuing to observe on current dose of Risperdal and patient in agreement for now. May consider further adjustments pending observation  PLAN  Patient s symptoms   are improving  Continue with current medication for now  Attempt to develop insight  Psycho-education conducted. Supportive Therapy conducted.   Probable discharge is likely 2 to 3 days  Follow-up daily while on inpatient unit

## 2021-12-07 NOTE — PROGRESS NOTES
[facility] Internal Medicine    CONSULTATION / HISTORY AND PHYSICAL EXAMINATION            Date:   12/7/2021  Patient name:  Patrica Doan  Date of admission:  12/5/2021  2:20 PM  MRN:   [id number]  Account:  [de-identified]  YOB: 1965  PCP:    Estevan Fraga MD  Room:   [address] [place] [address]  Code Status:    Full Code    Physician Requesting Consult: Everton Michele MD    Reason for Consult:  medical management    Chief Complaint:     Chief Complaint   Patient presents with    Suicidal       History Obtained From:     Patient medical record nursing staff    History of Present Illness:   Patient is not a very good historian, very lethargic  He was admitted to [facility] floor with generalized anxiety disorder, depression  Entered medicine consulted for management of high blood pressure  Patient has history of hypertension for long period of time, unfortunately he is not taking any medication for blood pressure control for last 6 months  Does not check blood pressure at home  No complaints of chest pain, shortness of breath, abdominal pain, palpitation      Past Medical History:     Past Medical History:   Diagnosis Date    Bipolar disorder ([facility] [address]     Hypertension     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 No pertinent surgical history. Medications Prior to Admission:     Prior to Admission medications    Medication Sig Start Date End Date Taking?  Authorizing Provider   amLODIPine (NORVASC) 5 MG tablet take 1 tablet by mouth once daily 4/4/21  Yes Maryellen Gamboa MD   omeprazole (PRILOSEC) 20 MG delayed release capsule take 1 capsule by mouth once daily WITH BREAKFAST 4/4/21  Yes Maryellen Gamboa MD   atorvastatin (LIPITOR) 40 MG tablet take 1 tablet by mouth once daily  Patient taking differently: 40 mg daily  3/3/21  Yes Giancarlo Mancini MD   lisinopril (PRINIVIL;ZESTRIL) 40 MG tablet take 1 tablet by mouth once daily 11/12/20  Yes Jem Michele MD   fluticasone (FLONASE) 50 MCG/ACT nasal spray instill 1 spray into each nostril once daily 5/14/20  Yes Jem Michele MD   metoprolol tartrate (LOPRESSOR) 25 MG tablet Take 1 tablet by mouth 2 times daily 5/14/20  Yes Toya Sanabria MD   VENTOLIN  (90 Base) MCG/ACT inhaler Inhale 2 puffs into the lungs every 6 hours as needed for Wheezing 5/14/20  Yes Jem Michele MD   gabapentin (NEURONTIN) 800 MG tablet Take 1 tablet by mouth three times daily. . 6/13/18  Yes Historical Provider, MD   gabapentin (NEURONTIN) 300 MG capsule Take 1 capsule by mouth nightly. . 6/13/18  Yes Historical Provider, MD   TRINTELLIX 10 MG TABS tablet Take 3 tablets by mouth daily 6/4/18  Yes Historical Provider, MD   tiotropium (SPIRIVA HANDIHALER) 18 MCG inhalation capsule Inhale 1 capsule into the lungs daily 5/14/20   Jem Michele MD   cetirizine (ZYRTEC) 10 MG tablet Take 1 tablet by mouth daily 5/1/20   Latoya Kaufman MD   buprenorphine-naloxone (SUBOXONE) 8-2 MG FILM SL film  11/14/19   Historical Provider, MD   Buprenorphine HCl-Naloxone HCl (SUBOXONE SL) Take 8 mg by mouth     Historical Provider, MD   ondansetron (ZOFRAN) 4 MG tablet Take 1 tablet by mouth daily as needed for Nausea or Vomiting 8/15/18   Maicol Bourne MD   buPROPion (WELLBUTRIN XL) 300 MG extended release tablet Take 300 mg by mouth every morning    Historical Provider, MD   OXcarbazepine (TRILEPTAL) 600 MG tablet Take 600 mg by mouth 2 times daily    Historical Provider, MD        Allergies:     Skelaxin [metaxalone] and Trazodone    Social History:     Tobacco:    reports that he has been smoking pipe and cigarettes. He started smoking about 19 years ago. He has a 33.00 pack-year smoking history. He has never used smokeless tobacco.  Alcohol:      reports previous alcohol use. Drug Use:  reports previous drug use. Drug: Cocaine.     Family History:     Family History   Problem Relation Age of Onset    Bipolar Disorder Mother     Alcohol Abuse Mother         Liver cirrhosis    Coronary Art Dis Brother     Hypertension Other         Sibling    Seizures Other         Siblings    Hypertension Father     Stroke Father        Review of Systems:     Positive and Negative as described in HPI. CONSTITUTIONAL:  negative for fevers, chills, sweats, fatigue, weight loss  HEENT:  negative for vision, hearing changes, runny nose, throat pain  RESPIRATORY:  negative for shortness of breath, cough, congestion, wheezing. CARDIOVASCULAR:  negative for chest pain, palpitations. GASTROINTESTINAL:  negative for nausea, vomiting, diarrhea, constipation, change in bowel habits, abdominal pain   GENITOURINARY:  negative for difficulty of urination, burning with urination, frequency   INTEGUMENT:  negative for rash, skin lesions, easy bruising   HEMATOLOGIC/LYMPHATIC:  negative for swelling/edema   ALLERGIC/IMMUNOLOGIC:  negative for urticaria , itching  ENDOCRINE:  negative increase in drinking, increase in urination, hot or cold intolerance  MUSCULOSKELETAL:  negative joint pains, muscle aches, swelling of joints  NEUROLOGICAL:  negative for headaches, dizziness, lightheadedness, numbness, pain, tingling extremities  BEHAVIOR/PSYCH: Lethargic    Physical Exam:     /69   Pulse 60   Temp 97.3 °F (36.3 °C) (Temporal)   Resp 14   Ht 5' 10\" (1.778 m)   Wt 173 lb (78.5 kg)   SpO2 95%   BMI 24.82 kg/m²   Temp (24hrs), Av.8 °F (36.6 °C), Min:97.3 °F (36.3 °C), Max:98.3 °F (36.8 °C)    No results for input(s): POCGLU in the last 72 hours. No intake or output data in the 24 hours ending 21 1157    General Appearance: Very lethargic, sleepy  Mental status: oriented to person, place, and time with normal affect  Head:  normocephalic, atraumatic.   Eye: no icterus, redness, pupils equal and reactive, extraocular eye movements intact, conjunctiva clear  Ear: normal external ear, no discharge, hearing intact  Nose:  no drainage noted  Mouth: mucous membranes moist  Neck: supple, no carotid bruits, thyroid not palpable  Lungs: Bilateral equal air entry, clear to ausculation, no wheezing, rales or rhonchi, normal effort  Cardiovascular: normal rate, regular rhythm, no murmur, gallop, rub. Abdomen: Soft, nontender, nondistended, normal bowel sounds, no hepatomegaly or splenomegaly  Neurologic: There are no new focal motor or sensory deficits, normal muscle tone and bulk, no abnormal sensation, normal speech, cranial nerves II through XII grossly intact  Skin: No gross lesions, rashes, bruising or bleeding on exposed skin area  Extremities:  peripheral pulses palpable, no pedal edema or calf pain with palpation  Psych: Investigations:      Laboratory Testing:  No results found for this or any previous visit (from the past 24 hour(s)). Consultations:   IP CONSULT TO INTERNAL MEDICINE  IP CONSULT TO INTERNAL MEDICINE  Assessment :      Primary Problem  Bipolar affective disorder, mixed, severe, with psychotic behavior [facility])    Active Hospital Problems    Diagnosis Date Noted    Bipolar affective disorder, mixed, severe, with psychotic behavior ([facility] [F31.64] 12/06/2021    Polysubstance abuse ([facility] [F19.10] 12/06/2021    ELIZA (generalized anxiety disorder) [F41.1] 12/06/2021       Plan:     1. Hypertension, uncontrolled due to noncompliance with medication  2. Patient supposed to be taking Norvasc, lisinopril, Lopressor. Since patient is not taking any antihypertensive for last 6 months, will start patient on Norvasc and lisinopril for now, will monitor blood pressure closely if blood pressure goes high will add Lopressor  3. History of COPD, compensated, on Spiriva inhaler, albuterol as needed  4. GERD, restarted home dose of Protonix  5.  We will follow    12/7  Patient blood pressure is controlled  COPD, controlled  We will continue with same medications  We will sign off, please call with questions    Winnie Bess, MD  12/7/2021  11:57 AM    Copy sent to Dr. Frannie Alexis MD    Please note that this chart was generated using voice recognition Dragon dictation software. Although every effort was made to ensure the accuracy of this automated transcription, some errors in transcription may have occurred.

## 2021-12-07 NOTE — GROUP NOTE
Group Therapy Note    Date: 12/7/2021    Group Start Time: 1330  Group End Time: 7130  Group Topic: Psychoeducation    CHARLES Renae      Patient declined to attend social skills group at 1330 despite encouragement from staff. 1:1 talk time offered by staff as alternative to group session.         Signature:  Michael Fairchild

## 2021-12-07 NOTE — PLAN OF CARE
Problem: Altered Mood, Depressive Behavior:  Goal: Able to verbalize and/or display a decrease in depressive symptoms  Description: Able to verbalize and/or display a decrease in depressive symptoms  12/6/2021 2021 by Nati Pedroza LPN  Outcome: Ongoing  Note: Patient denies suicidal thoughts and hallucinations. He reports mild depression and anxiety. He is irritable during talk time, partly due to being woke up and also related to not receiving any of his medications today. He stated \"I havent been given any meds all day! \" Patient was redirected and writer explained what evening medications he would receive, to which he responded well. He has been resting in his room this evening and calm. Q15min safety checks continue     Problem: Altered Mood, Depressive Behavior:  Goal: Ability to disclose and discuss suicidal ideas will improve  Description: Ability to disclose and discuss suicidal ideas will improve  12/6/2021 2021 by Nati Pedroza LPN  Outcome: Ongoing  Note: Patient denies suicidal ideations at this time. Patient agrees to seek out staff if they begin having suicidal ideations or need to talk.  Q15min safety checks continue

## 2021-12-07 NOTE — GROUP NOTE
Group Therapy Note    Date: 12/7/2021    Group Start Time: 1000  Group End Time: 1030  Group Topic: Psychotherapy    [facility]LES [facility] C    JAIMEE Martinez LSW        Group Therapy Note    Attendees: 6/19       Patient refused to attend psychotherapy group at 10:00 am after encouragement from staff.   1:1 talk time provided as alternative to group session    Discipline Responsible: /Counselor      Signature:  JAIMEE Martinez LSW

## 2021-12-07 NOTE — GROUP NOTE
Group Therapy Note    Date: 12/7/2021    Group Start Time: 1100  Group End Time: 0954  Group Topic: Psychoeducation    CHARLES Renae    Patient declined to attend self expression group at 1100 despite encouragement from staff. 1:1 talk time offered by staff as alternative to group session.         Signature:  Zoey Jimenez

## 2021-12-08 LAB
CHOLESTEROL/HDL RATIO: 3.6
CHOLESTEROL: 137 MG/DL
ESTIMATED AVERAGE GLUCOSE: 114 MG/DL
HBA1C MFR BLD: 5.6 % (ref 4–6)
HDLC SERPL-MCNC: 38 MG/DL
LDL CHOLESTEROL: 90 MG/DL (ref 0–130)
TRIGL SERPL-MCNC: 47 MG/DL
VLDLC SERPL CALC-MCNC: ABNORMAL MG/DL (ref 1–30)

## 2021-12-08 PROCEDURE — 6370000000 HC RX 637 (ALT 250 FOR IP): Performed by: PSYCHIATRY & NEUROLOGY

## 2021-12-08 PROCEDURE — 99232 SBSQ HOSP IP/OBS MODERATE 35: CPT | Performed by: PSYCHIATRY & NEUROLOGY

## 2021-12-08 PROCEDURE — 6370000000 HC RX 637 (ALT 250 FOR IP): Performed by: INTERNAL MEDICINE

## 2021-12-08 PROCEDURE — 1240000000 HC EMOTIONAL WELLNESS R&B

## 2021-12-08 PROCEDURE — 83036 HEMOGLOBIN GLYCOSYLATED A1C: CPT

## 2021-12-08 PROCEDURE — 80061 LIPID PANEL: CPT

## 2021-12-08 PROCEDURE — 36415 COLL VENOUS BLD VENIPUNCTURE: CPT

## 2021-12-08 RX ADMIN — AMLODIPINE BESYLATE 5 MG: 5 TABLET ORAL at 08:49

## 2021-12-08 RX ADMIN — ATORVASTATIN CALCIUM 40 MG: 20 TABLET, FILM COATED ORAL at 21:44

## 2021-12-08 RX ADMIN — Medication 1.5 MG: at 21:44

## 2021-12-08 RX ADMIN — LISINOPRIL 40 MG: 20 TABLET ORAL at 08:48

## 2021-12-08 RX ADMIN — RISPERIDONE 0.5 MG: 1 TABLET ORAL at 21:44

## 2021-12-08 RX ADMIN — PANTOPRAZOLE SODIUM 40 MG: 40 TABLET, DELAYED RELEASE ORAL at 08:48

## 2021-12-08 RX ADMIN — RISPERIDONE 0.5 MG: 1 TABLET ORAL at 08:49

## 2021-12-08 RX ADMIN — HYDROXYZINE HYDROCHLORIDE 50 MG: 50 TABLET, FILM COATED ORAL at 21:44

## 2021-12-08 RX ADMIN — NICOTINE POLACRILEX 2 MG: 2 GUM, CHEWING BUCCAL at 14:55

## 2021-12-08 RX ADMIN — NICOTINE POLACRILEX 2 MG: 2 GUM, CHEWING BUCCAL at 21:46

## 2021-12-08 NOTE — GROUP NOTE
Group Therapy Note    Date: 12/8/2021    Group Start Time: 1000  Group End Time: 1050  Group Topic: Psychotherapy    [facility] JAIMEE Bone LSW        Group Therapy Note    Attendees: 9/19       Patient refused to attend psychotherapy group at 10:00 am after encouragement from staff.   1:1 talk time provided as alternative to group session       Discipline Responsible: /Counselor      Signature:  JAIMEE Gleason LSW

## 2021-12-08 NOTE — PLAN OF CARE
Problem: Altered Mood, Depressive Behavior:  Goal: Ability to disclose and discuss suicidal ideas will improve  Description: Ability to disclose and discuss suicidal ideas will improve  12/8/2021 1052 by Media Bertin, RN  Outcome: Ongoing  Note: Patient denies suicidal ideation.

## 2021-12-08 NOTE — PLAN OF CARE
Problem: Altered Mood, Depressive Behavior:  Goal: Able to verbalize and/or display a decrease in depressive symptoms  Description: Able to verbalize and/or display a decrease in depressive symptoms  12/8/2021 0014 by Celestino Navarro LPN  Outcome: Ongoing  Note: Patient admits to depression at this time. Patient states its getting better. Patient is isolative to self and room this shift, coming out only for needs. Patient denies suicidal/homicidal ideations and hallucinations at this time. Patient monitored every 15 minutes with environmental safety checks. Problem: Altered Mood, Depressive Behavior:  Goal: Ability to disclose and discuss suicidal ideas will improve  Description: Ability to disclose and discuss suicidal ideas will improve  12/8/2021 0014 by Celestino Navarro LPN  Outcome: Ongoing  Note:  Patient denies suicidal ideations at this time. Patient agreed to seek staff at anytime he felt like any urges to harm self would arise. Safety checks maintained [id number]. Problem: Altered Mood, Depressive Behavior:  Goal: Absence of self-harm  Description: Absence of self-harm  12/8/2021 0014 by Celestino Navarro LPN  Outcome: Ongoing  Note: Patient is free of self harm at this time. Patient agrees to seek out staff if thoughts to harm self arise. Staff will provide support and reassurance as needed. Safety checks maintained every 15 minutes.       Problem: Altered Mood, Manic Behavior:  Goal: Ability to achieve adequate nutritional intake will improve  Description: Ability to achieve adequate nutritional intake will improve  12/8/2021 0014 by Celestino Navarro LPN  Outcome: Ongoing     Problem: Tobacco Use:  Goal: Inpatient tobacco use cessation counseling participation  Description: Inpatient tobacco use cessation counseling participation  12/8/2021 0014 by Celestino Navarro LPN  Outcome: Ongoing  Note: Patient given tobacco quitline number 74424207837 at this time, refusing to call at this time, states \" I just dont want to quit now\"- patient given information as to the dangers of long term tobacco use. Continue to reinforce the importance of tobacco cessation. Problem: Suicide risk  Goal: Provide patient with safe environment  Description: Provide patient with safe environment  12/8/2021 0014 by Emile Gordon LPN  Outcome: Ongoing  Note: Patient provided safe environment within [facility] milieu. Will continue to monitor and provide q15 min safety checks.        Problem: Nutrition  Goal: Optimal nutrition therapy  12/8/2021 0014 by Emile Gordon LPN  Outcome: Ongoing

## 2021-12-08 NOTE — GROUP NOTE
Group Therapy Note    Date: 12/8/2021    Group Start Time: 4673  Group End Time: 0561  Group Topic: Psychoeducation    CHARLES Oneill, CTRS    Patient refused to attend coping skills group at 1335 after encouragement from staff. 1:1 talk time offered by staff as alternative to group session.

## 2021-12-08 NOTE — PROGRESS NOTES
Daily Progress Note  12/8/2021    Patient Name: Foster Morton    CHIEF COMPLAINT: Depression with suicidal ideation         SUBJECTIVE:      Patient is seen today for a follow up assessment. Glenroy Powers is interviewed today bedside, he is easily arousable to verbal stimulus. He continues to avoid group activity, he cites not wanting to be around groups of people in small enclosed rooms secondary to [facility]. He reports that he has been coming out of his room at mealtime, otherwise he has been trying to keep to himself. He reports some improvement in his mood and is hopeful that he will be able to discharge towards the end of the week to follow-up outpatient with [facility]. He reports that he would like to engage in their Suboxone program.  At present he is denying any opiate withdrawal or cravings. He is tolerating the risperidone, yesterday was the first day he had to full doses. He reports that he did sleep better and is starting to feel well rested. He does endorse improvement in suicidal thoughts and continues to contract for safety. Appetite:  [x] Adequate/Unchanged  [] Increased  [] Decreased      Sleep:       [x] Adequate/Unchanged  [] Fair  [] Poor      Group Attendance on Unit:   [] Yes  [] Selectively    [x] No    Medication Side Effects: Denies         Mental Status Exam  Level of consciousness: Alert and awake   Appearance: Appropriate attire for setting, resting in bed, with fair  grooming and hygiene   Behavior/Motor: Approachable, isolating, out of bed for needs only  Attitude toward examiner: Mostly cooperative, attentive, fair eye contact  Speech: Normal rate, volume and tone  Mood: \"A little better\"  Affect: Congruent  Thought processes: Linear and coherent  Thought content: Denies homicidal ideation  Suicidal Ideation: Reports improvement in suicidal ideations, contracts for safety on the unit. Delusions: No evidence of delusions.    Perceptual Disturbance: Reports improvement in auditory hallucinations, patient does not appear to be responding to internal stimuli. Cognition: Oriented to self, location, time, and situation  Memory: intact  Insight: fair   Judgement: poor       Data   height is 5' 10\" (1.778 m) and weight is 173 lb (78.5 kg). His temporal temperature is 97.7 °F (36.5 °C). His blood pressure is 115/67 and his pulse is 54. His respiration is 14 and oxygen saturation is 95%.    Labs:   Admission on 12/05/2021   Component Date Value Ref Range Status    WBC 12/05/2021 12.1* 3.5 - 11.0 k/uL Final    RBC 12/05/2021 4.57  4.5 - 5.9 m/uL Final    Hemoglobin 12/05/2021 14.1  13.5 - 17.5 g/dL Final    Hematocrit 12/05/2021 41.7  41 - 53 % Final    MCV 12/05/2021 91.2  80 - 100 fL Final    MCH 12/05/2021 30.9  26 - 34 pg Final    MCHC 12/05/2021 33.9  31 - 37 g/dL Final    RDW 12/05/2021 13.9  11.5 - 14.9 % Final    Platelets 86/44/5602 317  150 - 450 k/uL Final    MPV 12/05/2021 6.5  6.0 - 12.0 fL Final    NRBC Automated 12/05/2021 NOT REPORTED  per 100 WBC Final    Differential Type 12/05/2021 NOT REPORTED   Final    Seg Neutrophils 12/05/2021 72* 36 - 66 % Final    Lymphocytes 12/05/2021 18* 24 - 44 % Final    Monocytes 12/05/2021 7  1 - 7 % Final    Eosinophils % 12/05/2021 1  0 - 4 % Final    Basophils 12/05/2021 2  0 - 2 % Final    Immature Granulocytes 12/05/2021 NOT REPORTED  0 % Final    Segs Absolute 12/05/2021 8.80  1.3 - 9.1 k/uL Final    Absolute Lymph # 12/05/2021 2.20  1.0 - 4.8 k/uL Final    Absolute Mono # 12/05/2021 0.80  0.1 - 1.3 k/uL Final    Absolute Eos # 12/05/2021 0.10  0.0 - 0.4 k/uL Final    Basophils Absolute 12/05/2021 0.20  0.0 - 0.2 k/uL Final    Absolute Immature Granulocyte 12/05/2021 NOT REPORTED  0.00 - 0.30 k/uL Final    WBC Morphology 12/05/2021 NOT REPORTED   Final    RBC Morphology 12/05/2021 NOT REPORTED   Final    Platelet Estimate 56/92/1740 NOT REPORTED   Final    Glucose 12/05/2021 111* 70 - 99 mg/dL Final    BUN 12/05/2021 16  6 - 20 mg/dL Final    CREATININE 12/05/2021 0.66* 0.70 - 1.20 mg/dL Final    Bun/Cre Ratio 12/05/2021 NOT REPORTED  9 - 20 Final    Calcium 12/05/2021 10.2  8.6 - 10.4 mg/dL Final    Sodium 12/05/2021 135  135 - 144 mmol/L Final    Potassium 12/05/2021 4.1  3.7 - 5.3 mmol/L Final    Chloride 12/05/2021 97* 98 - 107 mmol/L Final    CO2 12/05/2021 28  20 - 31 mmol/L Final    Anion Gap 12/05/2021 10  9 - 17 mmol/L Final    Alkaline Phosphatase 12/05/2021 60  40 - 129 U/L Final    ALT 12/05/2021 9  5 - 41 U/L Final    AST 12/05/2021 13  <40 U/L Final    Total Bilirubin 12/05/2021 0.42  0.3 - 1.2 mg/dL Final    Total Protein 12/05/2021 7.1  6.4 - 8.3 g/dL Final    Albumin 12/05/2021 4.7  3.5 - 5.2 g/dL Final    Albumin/Globulin Ratio 12/05/2021 NOT REPORTED  1.0 - 2.5 Final    GFR Non- 12/05/2021 >60  >60 mL/min Final    GFR  12/05/2021 >60  >60 mL/min Final    GFR Comment 12/05/2021        Final    Comment: Average GFR for 52-63 years old:   80 mL/min/1.73sq m  Chronic Kidney Disease:   <60 mL/min/1.73sq m  Kidney failure:   <15 mL/min/1.73sq m              eGFR calculated using average adult body mass. Additional eGFR calculator available at:        [employer]Devex.[employer]            GFR Staging 12/05/2021 NOT REPORTED   Final    Specimen Description 12/05/2021 . NASOPHARYNGEAL SWAB   Final    SARS-CoV-2, Rapid 12/05/2021 Not Detected  Not Detected Final    Comment:       Rapid NAAT:  The specimen is NEGATIVE for SARS-CoV-2, the novel coronavirus associated with   COVID-19. The ID NOW COVID-19 assay is designed to detect the virus that causes COVID-19 in patients   with signs and symptoms of infection who are suspected of COVID-19. An individual without symptoms of COVID-19 and who is not shedding SARS-CoV-2 virus would   expect to have a negative (not detected) result in this assay.   Negative results should be treated as presumptive and, if inconsistent with clinical signs   and symptoms or necessary for patient management,  should be tested with an alternative molecular assay. Negative results do not preclude   SARS-CoV-2 infection and   should not be used as the sole basis for patient management decisions.          Fact sheet for Healthcare Providers: Velvet[employer]patricio  Fact sheet for Patients: Velvet[employer]patricio          Methodology: Isothermal Nucleic Acid Amplification      Ethanol 12/05/2021 <10  <10 mg/dL Final    Ethanol percent 12/05/2021 <0.010  % Final    Amphetamine Screen, Ur 12/05/2021 NEGATIVE  NEGATIVE Final    Comment:       (Positive cutoff 1000 ng/mL)                  Barbiturate Screen, Ur 12/05/2021 NEGATIVE  NEGATIVE Final    Comment:       (Positive cutoff 200 ng/mL)                  Benzodiazepine Screen, Urine 12/05/2021 NEGATIVE  NEGATIVE Final    Comment:       (Positive cutoff 200 ng/mL)                  Cocaine Metabolite, Urine 12/05/2021 NEGATIVE  NEGATIVE Final    Comment:       (Positive cutoff 300 ng/mL)                  Methadone Screen, Urine 12/05/2021 POSITIVE* NEGATIVE Final    Comment:       (Positive cutoff 300 ng/mL)                  Opiates, Urine 12/05/2021 NEGATIVE  NEGATIVE Final    Comment:       (Positive cutoff 300 ng/mL)                  Phencyclidine, Urine 12/05/2021 NEGATIVE  NEGATIVE Final    Comment:       (Positive cutoff 25 ng/mL)                  Propoxyphene, Urine 12/05/2021 NOT REPORTED  NEGATIVE Final    Cannabinoid Scrn, Ur 12/05/2021 POSITIVE* NEGATIVE Final    Comment:       (Positive cutoff 50 ng/mL)                  Oxycodone Screen, Ur 12/05/2021 NEGATIVE  NEGATIVE Final    Comment:       (Positive cutoff 100 ng/mL)                  Methamphetamine, Urine 12/05/2021 NOT REPORTED  NEGATIVE Final    Tricyclic Antidepressants, Urine 12/05/2021 NOT REPORTED  NEGATIVE Final    MDMA, Urine 12/05/2021 NOT REPORTED  NEGATIVE Final    Buprenorphine Urine 12/05/2021 NOT REPORTED  NEGATIVE Final    Test Information 12/05/2021 Assay provides medical screening only. The absence of expected drug(s) and/or metabolite(s) may indicate diluted or adulterated urine, limitations of testing or timing of collection. Final    Comment: Testing for legal purposes should be confirmed by another method. To request confirmation   of test result, please call the lab within 7 days of sample submission.  Cholesterol 12/08/2021 137  <200 mg/dL Final    Comment:    Cholesterol Guidelines:      <200  Desirable   200-240  Borderline      >240  Undesirable         HDL 12/08/2021 38* >40 mg/dL Final    Comment:    HDL Guidelines:    <40     Undesirable   40-59    Borderline    >59     Desirable         LDL Cholesterol 12/08/2021 90  0 - 130 mg/dL Final    Comment:    LDL Guidelines:     <100    Desirable   100-129   Near to/above Desirable   130-159   Borderline      >159   Undesirable     Direct (measured) LDL and calculated LDL are not interchangeable tests.  Chol/HDL Ratio 12/08/2021 3.6  <5 Final            Triglycerides 12/08/2021 47  <150 mg/dL Final    Comment:    Triglyceride Guidelines:     <150   Desirable   150-199  Borderline   200-499  High     >499   Very high   Based on AHA Guidelines for fasting triglyceride, October 2012.  VLDL 12/08/2021 NOT REPORTED  1 - 30 mg/dL Final    Hemoglobin A1C 12/08/2021 5.6  4.0 - 6.0 % Final    Estimated Avg Glucose 12/08/2021 114  mg/dL Final    Comment: The ADA and AACC recommend providing the estimated average glucose result to permit better   patient understanding of their HBA1c result. Reviewed patient's current plan of care and vital signs with nursing staff.     Labs reviewed: [x] Yes    Medications  Current Facility-Administered Medications: atorvastatin (LIPITOR) tablet 40 mg, 40 mg, Oral, Nightly  cetirizine (ZYRTEC) tablet 10 mg, 10 mg, Oral, Daily  fluticasone (FLONASE) 50 MCG/ACT nasal spray 1 spray, 1 spray, Each Nostril, Daily  albuterol sulfate  (90 Base) MCG/ACT inhaler 2 puff, 2 puff, Inhalation, Q6H PRN  risperiDONE (RISPERDAL) tablet 0.5 mg, 0.5 mg, Oral, BID  tiotropium (SPIRIVA RESPIMAT) 2.5 MCG/ACT inhaler 2 puff, 2 puff, Inhalation, Daily  amLODIPine (NORVASC) tablet 5 mg, 5 mg, Oral, Daily  lisinopril (PRINIVIL;ZESTRIL) tablet 40 mg, 40 mg, Oral, Daily  pantoprazole (PROTONIX) tablet 40 mg, 40 mg, Oral, QAM AC  acetaminophen (TYLENOL) tablet 650 mg, 650 mg, Oral, Q4H PRN  aluminum & magnesium hydroxide-simethicone (MAALOX) 200-200-20 MG/5ML suspension 30 mL, 30 mL, Oral, Q6H PRN  haloperidol lactate (HALDOL) injection 5 mg, 5 mg, IntraMUSCular, Q4H PRN **AND** LORazepam (ATIVAN) injection 2 mg, 2 mg, IntraMUSCular, Q4H PRN **AND** diphenhydrAMINE (BENADRYL) injection 50 mg, 50 mg, IntraMUSCular, Q4H PRN  haloperidol (HALDOL) tablet 5 mg, 5 mg, Oral, Q4H PRN **AND** LORazepam (ATIVAN) tablet 2 mg, 2 mg, Oral, Q4H PRN  hydrOXYzine (ATARAX) tablet 50 mg, 50 mg, Oral, TID PRN  polyethylene glycol (GLYCOLAX) packet 17 g, 17 g, Oral, Daily PRN  nicotine polacrilex (NICORETTE) gum 2 mg, 2 mg, Oral, PRN  ibuprofen (ADVIL;MOTRIN) tablet 400 mg, 400 mg, Oral, Q6H PRN  influenza quadrivalent split vaccine (FLUZONE;FLUARIX;FLULAVAL;AFLURIA) injection 0.5 mL, 0.5 mL, IntraMUSCular, Prior to discharge  melatonin tablet 1.5 mg, 1.5 mg, Oral, Nightly PRN    ASSESSMENT  Bipolar affective disorder, mixed, severe, with psychotic behavior ([facility] [address]         PLAN  Patient symptoms are: Modest improvement  Continue current medication regimen and observe  May benefit from further titration of risperidone  Monitor need and frequency of PRN medications. Internal medicine evaluated today and signed off. Encourage participation in groups and milieu. Attempt to develop insight. Psycho-education conducted. Supportive Therapy conducted.   Probable discharge is per attending physician. Likely in the next 1 to 2 days with continued stability of symptoms. Follow-up daily while inpatient. Patient continues to be monitored in the inpatient psychiatric facility at [facility] for safety and stabilization. Patient continues to need, on a daily basis, active treatment furnished directly by or requiring the supervision of inpatient psychiatric personnel. Electronically signed by JORGE Maurer CNP on 12/8/2021 at 6:01 PM    **This report has been created using voice recognition software. It may contain minor errors which are inherent in voice recognition technology. **    I independently saw and evaluated the patient. I reviewed the nurse practitioners documentation above. Any additional comments or changes to the nurse practitioners documentation are stated below otherwise agree with assessment. Plan will be as follows:  Patient indicating to this Litzy Avendaño that he was doing better. Denying suicidal or homicidal ideation intent or plan. Reporting good sleep. Denying auditory visual hallucinations. Discussed if stable through tomorrow would consider discharge on Friday and patient was in agreement. He did request Suboxone but discussed that under the circumstances Suboxone initiation is not appropriate. Patient accepted this well. Also discussed Vivitrol but patient declining. Spent 30 minutes with the patient, of that greater than 16 minutes was spent in supportive psychotherapy  PLAN  Patient s symptoms   are improving  Continue with current medication for now  Attempt to develop insight  Psycho-education conducted. Supportive Therapy conducted.   Probable discharge is Friday  Follow-up daily while on inpatient unit

## 2021-12-08 NOTE — GROUP NOTE
Group Therapy Note    Date: 12/8/2021    Group Start Time: 2065  Group End Time: 0754  Group Topic: Psychoeducation    CHARLES Penaloza Agent, CTRS    Patient refused to attend leisure skills group at  after encouragement from staff. 1:1 talk time offered by staff as alternative to group session.

## 2021-12-09 PROCEDURE — 6370000000 HC RX 637 (ALT 250 FOR IP): Performed by: INTERNAL MEDICINE

## 2021-12-09 PROCEDURE — APPSS30 APP SPLIT SHARED TIME 16-30 MINUTES: Performed by: NURSE PRACTITIONER

## 2021-12-09 PROCEDURE — 99232 SBSQ HOSP IP/OBS MODERATE 35: CPT | Performed by: PSYCHIATRY & NEUROLOGY

## 2021-12-09 PROCEDURE — 1240000000 HC EMOTIONAL WELLNESS R&B

## 2021-12-09 PROCEDURE — 6370000000 HC RX 637 (ALT 250 FOR IP): Performed by: PSYCHIATRY & NEUROLOGY

## 2021-12-09 RX ORDER — IBUPROFEN 400 MG/1
400 TABLET ORAL EVERY 6 HOURS PRN
Qty: 120 TABLET | Refills: 0 | Status: SHIPPED | OUTPATIENT
Start: 2021-12-09

## 2021-12-09 RX ORDER — RISPERIDONE 0.5 MG/1
0.5 TABLET, FILM COATED ORAL 2 TIMES DAILY
Qty: 60 TABLET | Refills: 0 | Status: ON HOLD | OUTPATIENT
Start: 2021-12-09 | End: 2022-02-08 | Stop reason: HOSPADM

## 2021-12-09 RX ORDER — ATORVASTATIN CALCIUM 40 MG/1
40 TABLET, FILM COATED ORAL NIGHTLY
Qty: 30 TABLET | Refills: 0 | Status: ON HOLD | OUTPATIENT
Start: 2021-12-09 | End: 2022-02-08 | Stop reason: SDUPTHER

## 2021-12-09 RX ORDER — LANOLIN ALCOHOL/MO/W.PET/CERES
1.5 CREAM (GRAM) TOPICAL NIGHTLY PRN
Qty: 30 TABLET | Refills: 0 | Status: SHIPPED | OUTPATIENT
Start: 2021-12-09

## 2021-12-09 RX ORDER — FLUTICASONE PROPIONATE 50 MCG
SPRAY, SUSPENSION (ML) NASAL
Qty: 16 G | Refills: 0 | Status: SHIPPED | OUTPATIENT
Start: 2021-12-09

## 2021-12-09 RX ORDER — CETIRIZINE HYDROCHLORIDE 10 MG/1
10 TABLET ORAL DAILY
Qty: 30 TABLET | Refills: 0 | Status: ON HOLD | OUTPATIENT
Start: 2021-12-09 | End: 2022-02-08 | Stop reason: SDUPTHER

## 2021-12-09 RX ORDER — OMEPRAZOLE 20 MG/1
20 CAPSULE, DELAYED RELEASE ORAL DAILY
Qty: 30 CAPSULE | Refills: 0 | Status: SHIPPED | OUTPATIENT
Start: 2021-12-09

## 2021-12-09 RX ORDER — LISINOPRIL 40 MG/1
40 TABLET ORAL DAILY
Qty: 30 TABLET | Refills: 0 | Status: ON HOLD | OUTPATIENT
Start: 2021-12-10 | End: 2022-02-08 | Stop reason: SDUPTHER

## 2021-12-09 RX ADMIN — LISINOPRIL 40 MG: 20 TABLET ORAL at 08:50

## 2021-12-09 RX ADMIN — Medication 1.5 MG: at 20:44

## 2021-12-09 RX ADMIN — PANTOPRAZOLE SODIUM 40 MG: 40 TABLET, DELAYED RELEASE ORAL at 08:50

## 2021-12-09 RX ADMIN — RISPERIDONE 0.5 MG: 1 TABLET ORAL at 20:43

## 2021-12-09 RX ADMIN — ATORVASTATIN CALCIUM 40 MG: 20 TABLET, FILM COATED ORAL at 20:44

## 2021-12-09 RX ADMIN — NICOTINE POLACRILEX 2 MG: 2 GUM, CHEWING BUCCAL at 17:27

## 2021-12-09 RX ADMIN — RISPERIDONE 0.5 MG: 1 TABLET ORAL at 08:50

## 2021-12-09 RX ADMIN — AMLODIPINE BESYLATE 5 MG: 5 TABLET ORAL at 08:50

## 2021-12-09 RX ADMIN — HYDROXYZINE HYDROCHLORIDE 50 MG: 50 TABLET, FILM COATED ORAL at 20:43

## 2021-12-09 NOTE — PROGRESS NOTES
CLINICAL PHARMACY NOTE: MEDS TO BEDS    Total # of Prescriptions Filled: 9   The following medications were delivered to the patient:  · Atorvastatin 40mg  · Albuterol MDI   · Flonase  · Cetirizine HCL 10mg  · Risperidone 0.5mg  · Ibuprofen 400mg  · Melatonin 3mg  · Omeprazole 20mg  · Lisinopril 40mg    Additional Documentation:  Delivered medications to nurses station

## 2021-12-09 NOTE — GROUP NOTE
Group Therapy Note    Date: 12/9/2021    Group Start Time: 1330  Group End Time: 3545  Group Topic: Psychoeducation    [address]    Patient refused to attend progressive muscle relaxation group at 1330 after encouragement from staff. 1:1 talk time offered by staff as alternative to group session.

## 2021-12-09 NOTE — PROGRESS NOTES
coherent  Thought content: Denies homicidal ideation  Suicidal Ideation: Improving, no plan or intent to harm self, contracts for safety on the unit   Delusions: No evidence of delusions. Perceptual Disturbance: Patient does not appear to be responding to internal stimuli; denies AVH today  Cognition: Oriented to self, location, time, and situation  Memory: intact  Insight: fair   Judgement: poor       Data   height is 5' 10\" (1.778 m) and weight is 173 lb (78.5 kg). His oral temperature is 97.1 °F (36.2 °C). His blood pressure is 122/75 and his pulse is 62. His respiration is 14 and oxygen saturation is 95%.    Labs:   Admission on 12/05/2021   Component Date Value Ref Range Status    WBC 12/05/2021 12.1* 3.5 - 11.0 k/uL Final    RBC 12/05/2021 4.57  4.5 - 5.9 m/uL Final    Hemoglobin 12/05/2021 14.1  13.5 - 17.5 g/dL Final    Hematocrit 12/05/2021 41.7  41 - 53 % Final    MCV 12/05/2021 91.2  80 - 100 fL Final    MCH 12/05/2021 30.9  26 - 34 pg Final    MCHC 12/05/2021 33.9  31 - 37 g/dL Final    RDW 12/05/2021 13.9  11.5 - 14.9 % Final    Platelets 68/53/7061 317  150 - 450 k/uL Final    MPV 12/05/2021 6.5  6.0 - 12.0 fL Final    NRBC Automated 12/05/2021 NOT REPORTED  per 100 WBC Final    Differential Type 12/05/2021 NOT REPORTED   Final    Seg Neutrophils 12/05/2021 72* 36 - 66 % Final    Lymphocytes 12/05/2021 18* 24 - 44 % Final    Monocytes 12/05/2021 7  1 - 7 % Final    Eosinophils % 12/05/2021 1  0 - 4 % Final    Basophils 12/05/2021 2  0 - 2 % Final    Immature Granulocytes 12/05/2021 NOT REPORTED  0 % Final    Segs Absolute 12/05/2021 8.80  1.3 - 9.1 k/uL Final    Absolute Lymph # 12/05/2021 2.20  1.0 - 4.8 k/uL Final    Absolute Mono # 12/05/2021 0.80  0.1 - 1.3 k/uL Final    Absolute Eos # 12/05/2021 0.10  0.0 - 0.4 k/uL Final    Basophils Absolute 12/05/2021 0.20  0.0 - 0.2 k/uL Final    Absolute Immature Granulocyte 12/05/2021 NOT REPORTED  0.00 - 0.30 k/uL Final    WBC Morphology 12/05/2021 NOT REPORTED   Final    RBC Morphology 12/05/2021 NOT REPORTED   Final    Platelet Estimate 79/97/6220 NOT REPORTED   Final    Glucose 12/05/2021 111* 70 - 99 mg/dL Final    BUN 12/05/2021 16  6 - 20 mg/dL Final    CREATININE 12/05/2021 0.66* 0.70 - 1.20 mg/dL Final    Bun/Cre Ratio 12/05/2021 NOT REPORTED  9 - 20 Final    Calcium 12/05/2021 10.2  8.6 - 10.4 mg/dL Final    Sodium 12/05/2021 135  135 - 144 mmol/L Final    Potassium 12/05/2021 4.1  3.7 - 5.3 mmol/L Final    Chloride 12/05/2021 97* 98 - 107 mmol/L Final    CO2 12/05/2021 28  20 - 31 mmol/L Final    Anion Gap 12/05/2021 10  9 - 17 mmol/L Final    Alkaline Phosphatase 12/05/2021 60  40 - 129 U/L Final    ALT 12/05/2021 9  5 - 41 U/L Final    AST 12/05/2021 13  <40 U/L Final    Total Bilirubin 12/05/2021 0.42  0.3 - 1.2 mg/dL Final    Total Protein 12/05/2021 7.1  6.4 - 8.3 g/dL Final    Albumin 12/05/2021 4.7  3.5 - 5.2 g/dL Final    Albumin/Globulin Ratio 12/05/2021 NOT REPORTED  1.0 - 2.5 Final    GFR Non- 12/05/2021 >60  >60 mL/min Final    GFR  12/05/2021 >60  >60 mL/min Final    GFR Comment 12/05/2021        Final    Comment: Average GFR for 52-63 years old:   80 mL/min/1.73sq m  Chronic Kidney Disease:   <60 mL/min/1.73sq m  Kidney failure:   <15 mL/min/1.73sq m              eGFR calculated using average adult body mass. Additional eGFR calculator available at:        SpotHero[employer]            GFR Staging 12/05/2021 NOT REPORTED   Final    Specimen Description 12/05/2021 . NASOPHARYNGEAL SWAB   Final    SARS-CoV-2, Rapid 12/05/2021 Not Detected  Not Detected Final    Comment:       Rapid NAAT:  The specimen is NEGATIVE for SARS-CoV-2, the novel coronavirus associated with   COVID-19.         The ID NOW COVID-19 assay is designed to detect the virus that causes COVID-19 in patients   with signs and symptoms of infection who are suspected of COVID-19. An individual without symptoms of COVID-19 and who is not shedding SARS-CoV-2 virus would   expect to have a negative (not detected) result in this assay. Negative results should be treated as presumptive and, if inconsistent with clinical signs   and symptoms or necessary for patient management,  should be tested with an alternative molecular assay. Negative results do not preclude   SARS-CoV-2 infection and   should not be used as the sole basis for patient management decisions.          Fact sheet for Healthcare Providers: Velvet[employer]patricio  Fact sheet for Patients: Velvet[employer]patricio          Methodology: Isothermal Nucleic Acid Amplification      Ethanol 12/05/2021 <10  <10 mg/dL Final    Ethanol percent 12/05/2021 <0.010  % Final    Amphetamine Screen, Ur 12/05/2021 NEGATIVE  NEGATIVE Final    Comment:       (Positive cutoff 1000 ng/mL)                  Barbiturate Screen, Ur 12/05/2021 NEGATIVE  NEGATIVE Final    Comment:       (Positive cutoff 200 ng/mL)                  Benzodiazepine Screen, Urine 12/05/2021 NEGATIVE  NEGATIVE Final    Comment:       (Positive cutoff 200 ng/mL)                  Cocaine Metabolite, Urine 12/05/2021 NEGATIVE  NEGATIVE Final    Comment:       (Positive cutoff 300 ng/mL)                  Methadone Screen, Urine 12/05/2021 POSITIVE* NEGATIVE Final    Comment:       (Positive cutoff 300 ng/mL)                  Opiates, Urine 12/05/2021 NEGATIVE  NEGATIVE Final    Comment:       (Positive cutoff 300 ng/mL)                  Phencyclidine, Urine 12/05/2021 NEGATIVE  NEGATIVE Final    Comment:       (Positive cutoff 25 ng/mL)                  Propoxyphene, Urine 12/05/2021 NOT REPORTED  NEGATIVE Final    Cannabinoid Scrn, Ur 12/05/2021 POSITIVE* NEGATIVE Final    Comment:       (Positive cutoff 50 ng/mL)                  Oxycodone Screen, Ur 12/05/2021 NEGATIVE  NEGATIVE Final    Comment:       (Positive cutoff 100 ng/mL)                  Methamphetamine, Urine 12/05/2021 NOT REPORTED  NEGATIVE Final    Tricyclic Antidepressants, Urine 12/05/2021 NOT REPORTED  NEGATIVE Final    MDMA, Urine 12/05/2021 NOT REPORTED  NEGATIVE Final    Buprenorphine Urine 12/05/2021 NOT REPORTED  NEGATIVE Final    Test Information 12/05/2021 Assay provides medical screening only. The absence of expected drug(s) and/or metabolite(s) may indicate diluted or adulterated urine, limitations of testing or timing of collection. Final    Comment: Testing for legal purposes should be confirmed by another method. To request confirmation   of test result, please call the lab within 7 days of sample submission.  Cholesterol 12/08/2021 137  <200 mg/dL Final    Comment:    Cholesterol Guidelines:      <200  Desirable   200-240  Borderline      >240  Undesirable         HDL 12/08/2021 38* >40 mg/dL Final    Comment:    HDL Guidelines:    <40     Undesirable   40-59    Borderline    >59     Desirable         LDL Cholesterol 12/08/2021 90  0 - 130 mg/dL Final    Comment:    LDL Guidelines:     <100    Desirable   100-129   Near to/above Desirable   130-159   Borderline      >159   Undesirable     Direct (measured) LDL and calculated LDL are not interchangeable tests.  Chol/HDL Ratio 12/08/2021 3.6  <5 Final            Triglycerides 12/08/2021 47  <150 mg/dL Final    Comment:    Triglyceride Guidelines:     <150   Desirable   150-199  Borderline   200-499  High     >499   Very high   Based on AHA Guidelines for fasting triglyceride, October 2012.  VLDL 12/08/2021 NOT REPORTED  1 - 30 mg/dL Final    Hemoglobin A1C 12/08/2021 5.6  4.0 - 6.0 % Final    Estimated Avg Glucose 12/08/2021 114  mg/dL Final    Comment: The ADA and AACC recommend providing the estimated average glucose result to permit better   patient understanding of their HBA1c result.            Reviewed patient's current plan of care and vital signs with nursing staff. Labs reviewed: [x] Yes    Medications  Current Facility-Administered Medications: atorvastatin (LIPITOR) tablet 40 mg, 40 mg, Oral, Nightly  cetirizine (ZYRTEC) tablet 10 mg, 10 mg, Oral, Daily  fluticasone (FLONASE) 50 MCG/ACT nasal spray 1 spray, 1 spray, Each Nostril, Daily  albuterol sulfate  (90 Base) MCG/ACT inhaler 2 puff, 2 puff, Inhalation, Q6H PRN  risperiDONE (RISPERDAL) tablet 0.5 mg, 0.5 mg, Oral, BID  tiotropium (SPIRIVA RESPIMAT) 2.5 MCG/ACT inhaler 2 puff, 2 puff, Inhalation, Daily  amLODIPine (NORVASC) tablet 5 mg, 5 mg, Oral, Daily  lisinopril (PRINIVIL;ZESTRIL) tablet 40 mg, 40 mg, Oral, Daily  pantoprazole (PROTONIX) tablet 40 mg, 40 mg, Oral, QAM AC  acetaminophen (TYLENOL) tablet 650 mg, 650 mg, Oral, Q4H PRN  aluminum & magnesium hydroxide-simethicone (MAALOX) 200-200-20 MG/5ML suspension 30 mL, 30 mL, Oral, Q6H PRN  haloperidol lactate (HALDOL) injection 5 mg, 5 mg, IntraMUSCular, Q4H PRN **AND** LORazepam (ATIVAN) injection 2 mg, 2 mg, IntraMUSCular, Q4H PRN **AND** diphenhydrAMINE (BENADRYL) injection 50 mg, 50 mg, IntraMUSCular, Q4H PRN  haloperidol (HALDOL) tablet 5 mg, 5 mg, Oral, Q4H PRN **AND** LORazepam (ATIVAN) tablet 2 mg, 2 mg, Oral, Q4H PRN  hydrOXYzine (ATARAX) tablet 50 mg, 50 mg, Oral, TID PRN  polyethylene glycol (GLYCOLAX) packet 17 g, 17 g, Oral, Daily PRN  nicotine polacrilex (NICORETTE) gum 2 mg, 2 mg, Oral, PRN  ibuprofen (ADVIL;MOTRIN) tablet 400 mg, 400 mg, Oral, Q6H PRN  influenza quadrivalent split vaccine (FLUZONE;FLUARIX;FLULAVAL;AFLURIA) injection 0.5 mL, 0.5 mL, IntraMUSCular, Prior to discharge  melatonin tablet 1.5 mg, 1.5 mg, Oral, Nightly PRN    ASSESSMENT  Bipolar affective disorder, mixed, severe, with psychotic behavior ([facility] [address]         PLAN  Patient symptoms are: Improving  Continue current medication regimen and observe  Monitor need and frequency of PRN medications. Internal medicine evaluated today and signed off.   Encourage participation in groups and milieu. Attempt to develop insight. Psycho-education conducted. Supportive Therapy conducted. Probable discharge is Friday  Follow-up daily while inpatient. Patient continues to be monitored in the inpatient psychiatric facility at [facility] for safety and stabilization. Patient continues to need, on a daily basis, active treatment furnished directly by or requiring the supervision of inpatient psychiatric personnel. Electronically signed by JORGE Moses CNP on 12/9/2021 at 5:35 PM    **This report has been created using voice recognition software. It may contain minor errors which are inherent in voice recognition technology. **    I independently saw and evaluated the patient. I reviewed the nurse practitioners documentation above. Any additional comments or changes to the nurse practitioners documentation are stated below otherwise agree with assessment. Plan will be as follows:  Patient reporting improvement in his mood. Denying side effects to medication. Reporting no irritability. States he can contract for safety. Discussed if stable through tomorrow would consider discharge and patient is in agreement  PLAN  Patient s symptoms   are improving  Continue with current medication for now  Attempt to develop insight  Psycho-education conducted. Supportive Therapy conducted.   Probable discharge is tomorrow  Follow-up daily while on inpatient unit

## 2021-12-09 NOTE — GROUP NOTE
Group Therapy Note    Date: 12/9/2021    Group Start Time: 1100  Group End Time: 1973  Group Topic: Psychoeducation    CHARLES Gonzalez, CTRS    Patient refused to attend creative expression group at 1100 after encouragement from staff. 1:1 talk time offered by staff as alternative to group session.

## 2021-12-09 NOTE — PLAN OF CARE
Problem: Altered Mood, Depressive Behavior:  Goal: Able to verbalize and/or display a decrease in depressive symptoms  Description: Able to verbalize and/or display a decrease in depressive symptoms  12/8/2021 2151 by Mary Lou Mcnair LPN  Outcome: Ongoing  Note: Patient is able to verbalize his needs, pleasant mood during the evening, denies having feelings of depression, isolative to room, comes out for meds and snack. Problem: Altered Mood, Depressive Behavior:  Goal: Ability to disclose and discuss suicidal ideas will improve  Description: Ability to disclose and discuss suicidal ideas will improve  12/8/2021 2151 by Mary Lou Mcnair LPN  Outcome: Ongoing  Note: Patient denies all suicidal and homicidal thoughts.      Problem: Altered Mood, Depressive Behavior:  Goal: Absence of self-harm  Description: Absence of self-harm  12/8/2021 2150 by Mary Lou Mcnair LPN  Outcome: Ongoing     Problem: Altered Mood, Manic Behavior:  Goal: Ability to achieve adequate nutritional intake will improve  Description: Ability to achieve adequate nutritional intake will improve  12/8/2021 2151 by Mary Lou Mcnair LPN  Outcome: Ongoing     Problem: Tobacco Use:  Goal: Inpatient tobacco use cessation counseling participation  Description: Inpatient tobacco use cessation counseling participation  12/8/2021 2151 by Mary Lou Mcnair LPN  Outcome: Ongoing     Problem: Suicide risk  Goal: Provide patient with safe environment  Description: Provide patient with safe environment  12/8/2021 2151 by Mary Lou Mcnair LPN  Outcome: Ongoing     Problem: Nutrition  Goal: Optimal nutrition therapy  12/8/2021 2151 by Mary Lou Mcnair LPN  Outcome: Ongoing

## 2021-12-09 NOTE — PLAN OF CARE
Problem: Altered Mood, Depressive Behavior:  Goal: Able to verbalize and/or display a decrease in depressive symptoms  Description: Able to verbalize and/or display a decrease in depressive symptoms  Outcome: Ongoing  Pt relates to feeling depressed with some anxiety. Denies hallucinations. Declines groups. Out for meals. Minimal interaction with peers. Problem: Altered Mood, Manic Behavior:  Goal: Ability to achieve adequate nutritional intake will improve  Description: Ability to achieve adequate nutritional intake will improve  Outcome: Ongoing  Mood is labile. Irritable at times. Pt makes comments and is irritable but is accepted of redirection and says sorry quickly. Medication compliant. Isolates in room for long intervals. Problem: Suicide risk  Goal: Provide patient with safe environment  Description: Provide patient with safe environment  Outcome: Ongoing  Pt denies any suicidal thoughts. Pt. Remains safe on the unit. Q 15 minute checks for safety maintained. Pt. Does verbalize understanding to approach staff if feelings arise. Problem: Nutrition  Goal: Optimal nutrition therapy  Outcome: Ongoing  Pt out for meals and eats well.

## 2021-12-09 NOTE — GROUP NOTE
Group Therapy Note    Date: 12/9/2021    Group Start Time: 1000  Group End Time: 7649  Group Topic: Psychotherapy    [address], LSW    patient refused to attend psychotherapy group at [facility] after encouragement from staff.   1:1 talk time provided as alternative to group session

## 2021-12-09 NOTE — GROUP NOTE
Group Therapy Note    Date: 12/9/2021    Group Start Time: 1100  Group End Time: 8501  Group Topic: Psychoeducation    CHARLES Dickinson, MIRTHAS    Group Therapy Note

## 2021-12-09 NOTE — BH NOTE
Pt. Declined to attend [address] group. Pt offered 1:1 talk time as an alternative to group. Pt. Also declines.

## 2021-12-10 VITALS
BODY MASS INDEX: 24.77 KG/M2 | HEART RATE: 63 BPM | WEIGHT: 173 LBS | SYSTOLIC BLOOD PRESSURE: 112 MMHG | HEIGHT: 70 IN | OXYGEN SATURATION: 95 % | TEMPERATURE: 97.6 F | RESPIRATION RATE: 14 BRPM | DIASTOLIC BLOOD PRESSURE: 76 MMHG

## 2021-12-10 PROCEDURE — 6370000000 HC RX 637 (ALT 250 FOR IP): Performed by: INTERNAL MEDICINE

## 2021-12-10 PROCEDURE — 6370000000 HC RX 637 (ALT 250 FOR IP): Performed by: PSYCHIATRY & NEUROLOGY

## 2021-12-10 PROCEDURE — 99239 HOSP IP/OBS DSCHRG MGMT >30: CPT | Performed by: PSYCHIATRY & NEUROLOGY

## 2021-12-10 RX ADMIN — AMLODIPINE BESYLATE 5 MG: 5 TABLET ORAL at 07:56

## 2021-12-10 RX ADMIN — LISINOPRIL 40 MG: 20 TABLET ORAL at 07:57

## 2021-12-10 RX ADMIN — CETIRIZINE HYDROCHLORIDE 10 MG: 10 TABLET, FILM COATED ORAL at 07:57

## 2021-12-10 RX ADMIN — PANTOPRAZOLE SODIUM 40 MG: 40 TABLET, DELAYED RELEASE ORAL at 07:57

## 2021-12-10 RX ADMIN — RISPERIDONE 0.5 MG: 1 TABLET ORAL at 07:57

## 2021-12-10 NOTE — PLAN OF CARE
Problem: Altered Mood, Depressive Behavior:  Goal: Able to verbalize and/or display a decrease in depressive symptoms  Description: Able to verbalize and/or display a decrease in depressive symptoms  12/9/2021 2224 by Dereck Wagoner LPN  Outcome: Ongoing  Note: Patient verbalized a decrease in depressive. Patient denies suicidal/homicidal ideations and hallucinations at this time. Patient monitored every 15 minutes with environmental safety checks. Problem: Altered Mood, Depressive Behavior:  Goal: Ability to disclose and discuss suicidal ideas will improve  Description: Ability to disclose and discuss suicidal ideas will improve  Outcome: Ongoing  Note:  Patient denies suicidal ideations at this time. Patient agreed to seek staff at anytime he felt like any urges to harm self would arise. Safety checks maintained [id number]. Problem: Altered Mood, Depressive Behavior:  Goal: Absence of self-harm  Description: Absence of self-harm  Outcome: Ongoing  Note: Patient is free of self harm at this time. Patient agrees to seek out staff if thoughts to harm self arise. Staff will provide support and reassurance as needed. Safety checks maintained every 15 minutes. Problem: Altered Mood, Manic Behavior:  Goal: Ability to achieve adequate nutritional intake will improve  Description: Ability to achieve adequate nutritional intake will improve  12/9/2021 2224 by Dereck Wagoner LPN  Outcome: Ongoing     Problem: Tobacco Use:  Goal: Inpatient tobacco use cessation counseling participation  Description: Inpatient tobacco use cessation counseling participation  Outcome: Ongoing  Note: Patient given tobacco quitline number 41998254191 at this time, refusing to call at this time, states \" I just dont want to quit now\"- patient given information as to the dangers of long term tobacco use. Continue to reinforce the importance of tobacco cessation.        Problem: Suicide risk  Goal: Provide patient with safe environment  Description: Provide patient with safe environment  12/9/2021 2224 by Tsering Smith LPN  Outcome: Ongoing  Note: Patient provided safe environment within [facility] milieu. Will continue to monitor and provide q15 min safety checks.        Problem: Nutrition  Goal: Optimal nutrition therapy  12/9/2021 2224 by Tsering Smith LPN  Outcome: Ongoing

## 2021-12-10 NOTE — DISCHARGE SUMMARY
Provider Discharge Summary     Patient ID:  Dodie Jennings  [id number]  23 y.o.  1965    Admit date: 12/5/2021    Discharge date and time: 12/10/2021  12:55 PM     Admitting Physician: Noel You MD     Discharge Physician: Faraz Keith MD    Admission Diagnoses: Uncontrolled hypertension [I10]  Depression with suicidal ideation [F32. A, R45.851]  Bipolar affective disorder, mixed, severe, with psychotic behavior ([address] [F31.64]    Discharge Diagnoses:      Bipolar affective disorder, mixed, severe, with psychotic behavior ([address]     Patient Active Problem List   Diagnosis Code    Severe bipolar I disorder, current or most recent episode depressed ([address] F31.4    HTN (hypertension) I10    Irritable bowel syndrome K58.9    GERD (gastroesophageal reflux disease) K21.9    Chronic back pain M54.9, G89.29    Chronic diarrhea K52.9    Opioid withdrawal (HCC) F11.23    Depression with suicidal ideation F32. A, R45.851    Bipolar affective disorder, mixed, severe, with psychotic behavior ([address] F31.64    Polysubstance abuse ([address] F19.10    ELIZA (generalized anxiety disorder) F41.1        Admission Condition: poor    Discharged Condition: stable    Indication for Admission: threat to self    History of Present Illnes (present tense wording is of findings from admission exam and are not necessarily indicative of current findings):   Dodie Jennings is a 48 y.o. male who has a past medical history of prediabetes, hypertension, GERD, IBS. Patient has a past psychiatric history of MDD, opiate QUOC, and bipolar type I. Patient presented to the [facility] ED, Per social work: \"presents to the ED for suicidal ideation with thoughts of driving his car into another oncoming vehicle. Patient repots fear he will act on these thoughts in spite of him not wanting to die. Patient states \"I am just unstable, and I am afraid I am going to hurt myself and someone else. \"  Patient reports difficulty focusing which has made it hard for him to communicate. Patient states he is having racing thoughts and is unsure if he is hearing voices or if they are his own thoughts. Patient states \"Like what is that in my head that is saying I am worthless and telling me drive my car into a vehicle. \" Patient reports he is feeling manic and having racing thoughts, and difficulty to focus. Patient states he has also been \"seeing trail, and dark silhouettes. \" Patient states he has been having paranoid thoughts that \"something bad is going to happen to me\" and states this has lead to him being afraid to go to the store due to having panic attacks due to these thoughts. \"  Patient was also previously seen at [facility] 2 days ago for similar symptoms of depression though reports that he was not admitted because he did not meet criteria for self-harm. He reports that the symptoms continued to persist and are worsening which led him to present to [facility].     Patient reports that he stopped taking medications 6 months ago. Patient states that he got in arguments with staff at [facility] about \"messing with his medications\" so he gave up and stopped taking the medications at this time. Patient states that he experienced symptoms that presented like previous manic episodes starting \" a few weeks ago\". Symptoms included increased activity, racing thoughts, irritability. Patient states that he is no longer feeling \"manic\" and is currently feeling down, reporting that he feels \"hollow\" and depressed for the past two weeks almost every day. Patient states he is experiencing poor sleep hygiene, difficulty getting to sleep and not feeling rested during the day. Patient endorses feelings of guilt and worthlessness but has difficulty putting reasons for this into words. When asked if patient is currently having thoughts to harm himself he became irritable and said \"that shit again\".  He reports no current feelings of wanting to harm himself and states that if this changes he would talk with staff, he does report that he was feeling this way at the time he presented to the ER and felt that he cannot trust himself. Additionally, patient is presenting with increased lability. When asked about recent weight loss of 80 lbs in the past 6 months, patient was unable to communicate reasons for decreased food intake and became acutely irritable snapping at interviewer. Previously, patient was smiling and laughing. When asked about hearing voices, patient states that he feels his mind is playing tricks on him. When asked to explain this he said he was unable to stating he can not distinguish between internal thoughts or an internal voice reinforcing thoughts of worthlessness and depression. In addition to exploring symptoms of depression, gage and psychosis; patient reports anxiety that leads to panic attacks. He reports that he uses gabapentin and marijuana to help control the panic attacks. He denies any symptoms of OCD or PTSD.     Patient started a new job about 2 months ago to which she reports difficulty for filling responsibilities related to new medication trials and recent physical illness. Patient reports that he began feeling sick about 2 weeks ago which could have led to some of his mental health symptoms, however he began to experience his mental health symptoms prior to the physical illness. Patient reported feeling hot and cold and not having any energy and was unable to show up to work during the time of his illness. Upon admission COVID screen was negative, CBC showed increased white blood cells of 12.1, additionally patient had slightly elevated glucose level of 111.     Medications were discussed with patient and he reports he has been med compliant up until recent disagreements with [facility] 6 months ago. He reports a history of taking BuSpar, Atarax, gabapentin, trazodone, Latuda, Wellbutrin, Trileptal, Suboxone, Trintellix, omeprazole, and Seroquel. Patient states that he was also taking Suboxone for 3 to 4 years however he is not currently prescribed from this [facility] and would like this to be prescribed again. When asked what symptoms he is using Suboxone to treat, patient stated that he has been having cravings but can ignore them without the suboxone. Patient then contradicted himself stating he needs Suboxone because he cannot ignore his cravings and will relapse without it. Patient also reports that if he does not use all of his Suboxone he would sell the rest.  Before admission patient reported getting methadone from the street but reported it was not helpful. Additionally patient reported Adderall use approximately 1 week ago that he got from the street, he also reports that it led to conflict with his [facility] provider which led to his gabapentin discontinuation. Patient became irritable again when talking about this, stating that he needs this medication. Patient also stated Trileptal started to affect his sodium levels when previously taking. 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 Patient reports that he is open to trying new mood stabilizer. Hospital Course:   Upon admission, Scarlet Ko was provided a safe secure environment, introduced to unit milieu. Patient participated in groups and individual therapies. Meds were adjusted as noted below. After few days of hospital care, patient began to feel improvement. Depression lifted, thoughts to harm self ceased. Sleep improved, appetite was good. On morning rounds 12/10/2021, Scarlet Ko  endorses feeling ready for discharge. Patient denies suicidal or homicidal ideations, denies hallucinations or delusions. Denies SE's from meds. It was decided that maximum benefit from hospital care had been achieved and patient can be discharged. Consults:   Internal medicine for medical management    Significant Diagnostic Studies: Routine labs and diagnostics    Treatments: Psychotropic medications, therapy with group, milieu, and 1:1 with nurses, social workers, PAFredyC/CNP, and Attending physician.       Discharge Medications:  Discharge Medication List as of 12/10/2021  7:55 AM      START taking these medications    Details   ibuprofen (ADVIL;MOTRIN) 400 MG tablet Take 1 tablet by mouth every 6 hours as needed for Pain, Disp-120 tablet, R-0Normal      melatonin 3 MG TABS tablet Take 0.5 tablets by mouth nightly as needed (insomnia), Disp-30 tablet, R-0Normal      risperiDONE (RISPERDAL) 0.5 MG tablet Take 1 tablet by mouth 2 times daily, Disp-60 tablet, R-0Normal         CONTINUE these medications which have CHANGED    Details   atorvastatin (LIPITOR) 40 MG tablet Take 1 tablet by mouth nightly, Disp-30 tablet, R-0Normal      cetirizine (ZYRTEC) 10 MG tablet Take 1 tablet by mouth daily, Disp-30 tablet, R-0Normal      fluticasone (FLONASE) 50 MCG/ACT nasal spray instill 1 spray into each nostril once daily, Disp-16 g, R-0Normal      lisinopril (PRINIVIL;ZESTRIL) 40 MG tablet Take 1 tablet by mouth daily, Disp-30 tablet, R-0Normal      omeprazole (PRILOSEC) 20 MG delayed release capsule Take 1 capsule by mouth Daily, Disp-30 capsule, R-0Normal      VENTOLIN  (90 Base) MCG/ACT inhaler Inhale 2 puffs into the lungs every 6 hours as needed for Wheezing, Disp-1 each, R-0, DAWNormal         CONTINUE these medications which have NOT CHANGED    Details   amLODIPine (NORVASC) 5 MG tablet take 1 tablet by mouth once daily, Disp-30 tablet, R-3Normal         STOP taking these medications       metoprolol tartrate (LOPRESSOR) 25 MG tablet Comments:   Reason for Stopping:         tiotropium (SPIRIVA HANDIHALER) 18 MCG inhalation capsule Comments:   Reason for Stopping:         buprenorphine-naloxone (SUBOXONE) 8-2 MG FILM SL film Comments:   Reason for Stopping:         Buprenorphine HCl-Naloxone HCl (SUBOXONE SL) Comments:   Reason for Stopping:         ondansetron (ZOFRAN) 4 MG tablet Comments:   Reason for Stopping:         gabapentin (NEURONTIN) 800 MG tablet Comments:   Reason for Stopping:         gabapentin (NEURONTIN) 300 MG capsule Comments:   Reason for Stopping:         TRINTELLIX 10 MG TABS tablet Comments:   Reason for Stopping:         buPROPion (WELLBUTRIN XL) 300 MG extended release tablet Comments:   Reason for Stopping:         OXcarbazepine (TRILEPTAL) 600 MG tablet Comments:   Reason for Stopping:         vortioxetine (BRINTELLIX) 5 MG TABS tablet Comments:   Reason for Stopping:                Core Measures statement:   Not applicable    Discharge Exam:  Level of consciousness:  Within normal limits  Appearance: Street clothes, seated, with good grooming  Behavior/Motor: No abnormalities noted  Attitude toward examiner:  Cooperative, attentive, good eye contact  Speech:  spontaneous, normal rate, normal volume and well articulated  Mood:  euthymic  Affect:  Full range  Thought processes:  linear, goal directed and coherent  Thought content:  denies homicidal ideation  Suicidal Ideation:  denies suicidal ideation  Delusions:  no evidence of delusions  Perceptual Disturbance:  denies any perceptual disturbance  Cognition:  Intact  Memory: age appropriate  Insight & Judgement: fair  Medication side effects: denies     Disposition: home    Patient Instructions: Activity: activity as tolerated  1. Patient instructed to take medications regularly and follow up with outpatient appointments. Follow-up as scheduled with outpatient [facility] mental health      Signed:    Electronically signed by Josselin Whitlock MD on 12/10/21 at 12:55 PM EST    Time Spent on discharge is more than 30 minutes in the examination, evaluation, counseling and review of medications and discharge plan.

## 2021-12-10 NOTE — CARE COORDINATION
Name: Sabrina Russell    : 1965    Discharge Date: 12/10/21    Primary Auth/Cert #: [id number]    Destination:home     Discharge Medications:      Medication List      START taking these medications    ibuprofen 400 MG tablet  Commonly known as: ADVIL;MOTRIN  Take 1 tablet by mouth every 6 hours as needed for Pain  Notes to patient: For pain     melatonin 3 MG Tabs tablet  Take 0.5 tablets by mouth nightly as needed (insomnia)  Notes to patient: For sleep     risperiDONE 0.5 MG tablet  Commonly known as: RISPERDAL  Take 1 tablet by mouth 2 times daily  Notes to patient: For clear thoughts         CHANGE how you take these medications    atorvastatin 40 MG tablet  Commonly known as: LIPITOR  Take 1 tablet by mouth nightly  What changed: See the new instructions. Notes to patient: For high cholesterol     lisinopril 40 MG tablet  Commonly known as: PRINIVIL;ZESTRIL  Take 1 tablet by mouth daily  What changed: See the new instructions. Notes to patient: For high blood pressure     omeprazole 20 MG delayed release capsule  Commonly known as: PRILOSEC  Take 1 capsule by mouth Daily  What changed: See the new instructions. Notes to patient: For prevention of heartburn        CONTINUE taking these medications    amLODIPine 5 MG tablet  Commonly known as: NORVASC  take 1 tablet by mouth once daily  Notes to patient: For high blood pressure     cetirizine 10 MG tablet  Commonly known as: ZYRTEC  Take 1 tablet by mouth daily  Notes to patient: For allergies     fluticasone 50 MCG/ACT nasal spray  Commonly known as: FLONASE  instill 1 spray into each nostril once daily  Notes to patient: For allergies     Ventolin  (90 Base) MCG/ACT inhaler  Generic drug: albuterol sulfate HFA  Inhale 2 puffs into the lungs every 6 hours as needed for Wheezing  Notes to patient:  For wheezing        STOP taking these medications    buprenorphine-naloxone 8-2 MG Film SL film  Commonly known as: SUBOXONE     buPROPion 300 MG extended release tablet  Commonly known as: WELLBUTRIN XL     gabapentin 300 MG capsule  Commonly known as: NEURONTIN     gabapentin 800 MG tablet  Commonly known as: NEURONTIN     metoprolol tartrate 25 MG tablet  Commonly known as: LOPRESSOR     ondansetron 4 MG tablet  Commonly known as: Zofran     Spiriva HandiHaler 18 MCG inhalation capsule  Generic drug: tiotropium     SUBOXONE SL     Trileptal 600 MG tablet  Generic drug: OXcarbazepine     Trintellix 10 MG Tabs tablet  Generic drug: VORTIoxetine           Where to Get Your Medications      These medications were sent to [place] [address]    Phone: [phone]   · atorvastatin 40 MG tablet  · cetirizine 10 MG tablet  · fluticasone 50 MCG/ACT nasal spray  · ibuprofen 400 MG tablet  · lisinopril 40 MG tablet  · melatonin 3 MG Tabs tablet  · omeprazole 20 MG delayed release capsule  · risperiDONE 0.5 MG tablet  · Ventolin  (90 Base) MCG/ACT inhaler         Follow Up Appointment: Jai Mancia [address] [place] [address] [phone]  On 12/14/2021  @ 12:30pm for intake

## 2021-12-10 NOTE — BH NOTE
Patient given tobacco quitline number 31283941641 at this time, refusing to call at this time, states \" I just dont want to quit now\"- patient given information as to the dangers of long term tobacco use. Continue to reinforce the importance of tobacco cessation.

## 2022-02-02 ENCOUNTER — HOSPITAL ENCOUNTER (INPATIENT)
Age: 57
LOS: 6 days | Discharge: HOME | DRG: 753 | End: 2022-02-08
Attending: STUDENT IN AN ORGANIZED HEALTH CARE EDUCATION/TRAINING PROGRAM | Admitting: PSYCHIATRY & NEUROLOGY
Payer: MEDICARE

## 2022-02-02 DIAGNOSIS — J30.2 SEASONAL ALLERGIC RHINITIS, UNSPECIFIED TRIGGER: ICD-10-CM

## 2022-02-02 DIAGNOSIS — R45.851 SUICIDAL IDEATION: Primary | ICD-10-CM

## 2022-02-02 DIAGNOSIS — E78.5 DYSLIPIDEMIA: ICD-10-CM

## 2022-02-02 DIAGNOSIS — F11.10 OPIOID ABUSE, EPISODIC (HCC): ICD-10-CM

## 2022-02-02 PROBLEM — F23 ACUTE PSYCHOSIS (HCC): Status: ACTIVE | Noted: 2022-02-02

## 2022-02-02 LAB
-: ABNORMAL
ABSOLUTE EOS #: 0.1 K/UL (ref 0–0.4)
ABSOLUTE IMMATURE GRANULOCYTE: ABNORMAL K/UL (ref 0–0.3)
ABSOLUTE LYMPH #: 1.5 K/UL (ref 1–4.8)
ABSOLUTE MONO #: 0.6 K/UL (ref 0.1–1.3)
ALBUMIN SERPL-MCNC: 4.2 G/DL (ref 3.5–5.2)
ALBUMIN/GLOBULIN RATIO: ABNORMAL (ref 1–2.5)
ALP BLD-CCNC: 75 U/L (ref 40–129)
ALT SERPL-CCNC: 6 U/L (ref 5–41)
AMORPHOUS: ABNORMAL
AMPHETAMINE SCREEN URINE: NEGATIVE
ANION GAP SERPL CALCULATED.3IONS-SCNC: 12 MMOL/L (ref 9–17)
AST SERPL-CCNC: 10 U/L
BACTERIA: ABNORMAL
BARBITURATE SCREEN URINE: NEGATIVE
BASOPHILS # BLD: 1 % (ref 0–2)
BASOPHILS ABSOLUTE: 0.1 K/UL (ref 0–0.2)
BENZODIAZEPINE SCREEN, URINE: NEGATIVE
BILIRUB SERPL-MCNC: 0.2 MG/DL (ref 0.3–1.2)
BILIRUBIN URINE: NEGATIVE
BUN BLDV-MCNC: 13 MG/DL (ref 6–20)
BUN/CREAT BLD: ABNORMAL (ref 9–20)
BUPRENORPHINE URINE: ABNORMAL
CALCIUM SERPL-MCNC: 9.3 MG/DL (ref 8.6–10.4)
CANNABINOID SCREEN URINE: POSITIVE
CASTS UA: ABNORMAL /LPF
CHLORIDE BLD-SCNC: 97 MMOL/L (ref 98–107)
CO2: 25 MMOL/L (ref 20–31)
COCAINE METABOLITE, URINE: NEGATIVE
COLOR: YELLOW
COMMENT UA: ABNORMAL
CREAT SERPL-MCNC: 0.83 MG/DL (ref 0.7–1.2)
CRYSTALS, UA: ABNORMAL /HPF
DIFFERENTIAL TYPE: ABNORMAL
EOSINOPHILS RELATIVE PERCENT: 1 % (ref 0–4)
EPITHELIAL CELLS UA: ABNORMAL /HPF
ETHANOL PERCENT: <0.01 %
ETHANOL: <10 MG/DL
GFR AFRICAN AMERICAN: >60 ML/MIN
GFR NON-AFRICAN AMERICAN: >60 ML/MIN
GFR SERPL CREATININE-BSD FRML MDRD: ABNORMAL ML/MIN/{1.73_M2}
GFR SERPL CREATININE-BSD FRML MDRD: ABNORMAL ML/MIN/{1.73_M2}
GLUCOSE BLD-MCNC: 141 MG/DL (ref 70–99)
GLUCOSE URINE: NEGATIVE
HCT VFR BLD CALC: 40.8 % (ref 41–53)
HEMOGLOBIN: 13.6 G/DL (ref 13.5–17.5)
IMMATURE GRANULOCYTES: ABNORMAL %
KETONES, URINE: NEGATIVE
LEUKOCYTE ESTERASE, URINE: NEGATIVE
LYMPHOCYTES # BLD: 13 % (ref 24–44)
MCH RBC QN AUTO: 31.1 PG (ref 26–34)
MCHC RBC AUTO-ENTMCNC: 33.4 G/DL (ref 31–37)
MCV RBC AUTO: 92.9 FL (ref 80–100)
MDMA URINE: ABNORMAL
METHADONE SCREEN, URINE: NEGATIVE
METHAMPHETAMINE, URINE: ABNORMAL
MONOCYTES # BLD: 5 % (ref 1–7)
MUCUS: ABNORMAL
NITRITE, URINE: NEGATIVE
NRBC AUTOMATED: ABNORMAL PER 100 WBC
OPIATES, URINE: NEGATIVE
OTHER OBSERVATIONS UA: ABNORMAL
OXYCODONE SCREEN URINE: NEGATIVE
PDW BLD-RTO: 14.6 % (ref 11.5–14.9)
PH UA: 6.5 (ref 5–8)
PHENCYCLIDINE, URINE: NEGATIVE
PLATELET # BLD: 320 K/UL (ref 150–450)
PLATELET ESTIMATE: ABNORMAL
PMV BLD AUTO: 6.1 FL (ref 6–12)
POTASSIUM SERPL-SCNC: 3.8 MMOL/L (ref 3.7–5.3)
PROPOXYPHENE, URINE: ABNORMAL
PROTEIN UA: NEGATIVE
RBC # BLD: 4.39 M/UL (ref 4.5–5.9)
RBC # BLD: ABNORMAL 10*6/UL
RBC UA: ABNORMAL /HPF
RENAL EPITHELIAL, UA: ABNORMAL /HPF
SARS-COV-2, RAPID: NOT DETECTED
SEG NEUTROPHILS: 80 % (ref 36–66)
SEGMENTED NEUTROPHILS ABSOLUTE COUNT: 9.5 K/UL (ref 1.3–9.1)
SODIUM BLD-SCNC: 134 MMOL/L (ref 135–144)
SPECIFIC GRAVITY UA: 1.02 (ref 1–1.03)
SPECIMEN DESCRIPTION: NORMAL
TEST INFORMATION: ABNORMAL
TOTAL PROTEIN: 6.6 G/DL (ref 6.4–8.3)
TRICHOMONAS: ABNORMAL
TRICYCLIC ANTIDEPRESSANTS, UR: ABNORMAL
TURBIDITY: CLEAR
URINE HGB: ABNORMAL
UROBILINOGEN, URINE: NORMAL
WBC # BLD: 11.8 K/UL (ref 3.5–11)
WBC # BLD: ABNORMAL 10*3/UL
WBC UA: ABNORMAL /HPF
YEAST: ABNORMAL

## 2022-02-02 PROCEDURE — 85025 COMPLETE CBC W/AUTO DIFF WBC: CPT

## 2022-02-02 PROCEDURE — 6370000000 HC RX 637 (ALT 250 FOR IP): Performed by: PSYCHIATRY & NEUROLOGY

## 2022-02-02 PROCEDURE — 81001 URINALYSIS AUTO W/SCOPE: CPT

## 2022-02-02 PROCEDURE — 36415 COLL VENOUS BLD VENIPUNCTURE: CPT

## 2022-02-02 PROCEDURE — 87635 SARS-COV-2 COVID-19 AMP PRB: CPT

## 2022-02-02 PROCEDURE — 80307 DRUG TEST PRSMV CHEM ANLYZR: CPT

## 2022-02-02 PROCEDURE — 6370000000 HC RX 637 (ALT 250 FOR IP): Performed by: STUDENT IN AN ORGANIZED HEALTH CARE EDUCATION/TRAINING PROGRAM

## 2022-02-02 PROCEDURE — 1240000000 HC EMOTIONAL WELLNESS R&B

## 2022-02-02 PROCEDURE — 80053 COMPREHEN METABOLIC PANEL: CPT

## 2022-02-02 PROCEDURE — G0480 DRUG TEST DEF 1-7 CLASSES: HCPCS

## 2022-02-02 PROCEDURE — 99285 EMERGENCY DEPT VISIT HI MDM: CPT

## 2022-02-02 RX ORDER — NICOTINE 21 MG/24HR
1 PATCH, TRANSDERMAL 24 HOURS TRANSDERMAL DAILY
Status: DISCONTINUED | OUTPATIENT
Start: 2022-02-02 | End: 2022-02-03

## 2022-02-02 RX ORDER — LORAZEPAM 1 MG/1
1 TABLET ORAL ONCE
Status: DISCONTINUED | OUTPATIENT
Start: 2022-02-02 | End: 2022-02-08 | Stop reason: HOSPADM

## 2022-02-02 RX ORDER — POLYETHYLENE GLYCOL 3350 17 G/17G
17 POWDER, FOR SOLUTION ORAL DAILY PRN
Status: DISCONTINUED | OUTPATIENT
Start: 2022-02-02 | End: 2022-02-08 | Stop reason: HOSPADM

## 2022-02-02 RX ORDER — IBUPROFEN 400 MG/1
400 TABLET ORAL EVERY 6 HOURS PRN
Status: DISCONTINUED | OUTPATIENT
Start: 2022-02-02 | End: 2022-02-08 | Stop reason: HOSPADM

## 2022-02-02 RX ORDER — HYDROXYZINE 50 MG/1
50 TABLET, FILM COATED ORAL 3 TIMES DAILY PRN
Status: DISCONTINUED | OUTPATIENT
Start: 2022-02-02 | End: 2022-02-08 | Stop reason: HOSPADM

## 2022-02-02 RX ORDER — ACETAMINOPHEN 325 MG/1
650 TABLET ORAL EVERY 4 HOURS PRN
Status: DISCONTINUED | OUTPATIENT
Start: 2022-02-02 | End: 2022-02-08 | Stop reason: HOSPADM

## 2022-02-02 RX ORDER — MAGNESIUM HYDROXIDE/ALUMINUM HYDROXICE/SIMETHICONE 120; 1200; 1200 MG/30ML; MG/30ML; MG/30ML
30 SUSPENSION ORAL EVERY 6 HOURS PRN
Status: DISCONTINUED | OUTPATIENT
Start: 2022-02-02 | End: 2022-02-08 | Stop reason: HOSPADM

## 2022-02-02 RX ADMIN — HYDROXYZINE HYDROCHLORIDE 50 MG: 50 TABLET, FILM COATED ORAL at 23:28

## 2022-02-02 RX ADMIN — NICOTINE POLACRILEX 2 MG: 2 GUM, CHEWING BUCCAL at 17:18

## 2022-02-02 RX ADMIN — NICOTINE POLACRILEX 2 MG: 2 GUM, CHEWING BUCCAL at 15:26

## 2022-02-02 RX ADMIN — HYDROXYZINE HYDROCHLORIDE 50 MG: 50 TABLET, FILM COATED ORAL at 17:31

## 2022-02-02 ASSESSMENT — SLEEP AND FATIGUE QUESTIONNAIRES
AVERAGE NUMBER OF SLEEP HOURS: 2
DIFFICULTY ARISING: NO
RESTFUL SLEEP: NO
DIFFICULTY FALLING ASLEEP: YES
DO YOU USE A SLEEP AID: NO
DIFFICULTY STAYING ASLEEP: YES
DO YOU HAVE DIFFICULTY SLEEPING: YES
SLEEP PATTERN: RESTLESSNESS;DIFFICULTY FALLING ASLEEP

## 2022-02-02 ASSESSMENT — ENCOUNTER SYMPTOMS
NAUSEA: 0
BACK PAIN: 0
DIARRHEA: 0
RHINORRHEA: 0
VOMITING: 0
COUGH: 0
EYE PAIN: 0
FACIAL SWELLING: 0
SHORTNESS OF BREATH: 0
COLOR CHANGE: 0
SORE THROAT: 0
ABDOMINAL PAIN: 0
EYE REDNESS: 0

## 2022-02-02 ASSESSMENT — PATIENT HEALTH QUESTIONNAIRE - PHQ9: SUM OF ALL RESPONSES TO PHQ QUESTIONS 1-9: 18

## 2022-02-02 ASSESSMENT — LIFESTYLE VARIABLES: HISTORY_ALCOHOL_USE: NO

## 2022-02-02 NOTE — ED PROVIDER NOTES
EMERGENCY DEPARTMENT ENCOUNTER    Pt Name: Lee Barnes  MRN: [id number]  Birthdate 1965  Date of evaluation: 2/2/22  CHIEF COMPLAINT       Chief Complaint   Patient presents with    Suicidal     HISTORY OF PRESENT ILLNESS   HPI  78-year-old male history of bipolar, hypertension, anxiety presents for evaluation of suicidal ideations. Symptoms been worsening over the past 1 to 2 weeks. Patient has not taken his medicine in several weeks. Patient states he has been having thoughts of overdosing on pills. He does occasionally hear nonspecific voices. No actual attempted self-harm. Was admitted with similar symptoms in December of last year. No other physical symptoms. Symptoms are moderate and progressive. REVIEW OF SYSTEMS     Review of Systems   Constitutional: Negative for chills and fatigue. HENT: Negative for facial swelling, nosebleeds, rhinorrhea and sore throat. Eyes: Negative for pain and redness. Respiratory: Negative for cough and shortness of breath. Cardiovascular: Negative for chest pain and leg swelling. Gastrointestinal: Negative for abdominal pain, diarrhea, nausea and vomiting. Genitourinary: Negative for flank pain and hematuria. Musculoskeletal: Negative for arthralgias and back pain. Skin: Negative for color change and rash. Neurological: Negative for dizziness, tremors, facial asymmetry, speech difficulty, weakness and numbness. Psychiatric/Behavioral: Positive for hallucinations and suicidal ideas. Negative for self-injury.      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 A, R45.851    Bipolar affective disorder, mixed, severe, with psychotic behavior ([facility] F31.64    Polysubstance abuse ([facility] F19.10    ELIZA (generalized anxiety disorder) F41.1    Acute psychosis ([facility] F23     SURGICAL HISTORY     History reviewed. No pertinent surgical history. CURRENT MEDICATIONS       Previous Medications    AMLODIPINE (NORVASC) 5 MG TABLET    take 1 tablet by mouth once daily    ATORVASTATIN (LIPITOR) 40 MG TABLET    Take 1 tablet by mouth nightly    CETIRIZINE (ZYRTEC) 10 MG TABLET    Take 1 tablet by mouth daily    FLUTICASONE (FLONASE) 50 MCG/ACT NASAL SPRAY    instill 1 spray into each nostril once daily    IBUPROFEN (ADVIL;MOTRIN) 400 MG TABLET    Take 1 tablet by mouth every 6 hours as needed for Pain    LISINOPRIL (PRINIVIL;ZESTRIL) 40 MG TABLET    Take 1 tablet by mouth daily    MELATONIN 3 MG TABS TABLET    Take 0.5 tablets by mouth nightly as needed (insomnia)    OMEPRAZOLE (PRILOSEC) 20 MG DELAYED RELEASE CAPSULE    Take 1 capsule by mouth Daily    RISPERIDONE (RISPERDAL) 0.5 MG TABLET    Take 1 tablet by mouth 2 times daily    VENTOLIN  (90 BASE) MCG/ACT INHALER    Inhale 2 puffs into the lungs every 6 hours as needed for Wheezing     ALLERGIES     is allergic to skelaxin [metaxalone] and trazodone. FAMILY HISTORY     He indicated that the status of his mother is unknown. He indicated that the status of his father is unknown. He indicated that the status of his brother is unknown.      SOCIAL HISTORY       Social History     Tobacco Use    Smoking status: Current Every Day Smoker     Packs/day: 1.00     Years: 33.00     Pack years: 33.00     Types: Pipe, Cigarettes     Start date: 7/17/2002    Smokeless tobacco: Never Used   Vaping Use    Vaping Use: Never used   Substance Use Topics    Alcohol use: Not Currently     Comment: Currently in the past    Drug use: Not Currently     Types: Cocaine     Comment: Last was 15 years ago     PHYSICAL EXAM     INITIAL VITALS: BP (!) 143/77   Pulse 89   Temp 97.8 °F (36.6 °C) (Oral)   Resp 20   SpO2 99%    Physical Exam  Constitutional:       Appearance: Normal appearance. HENT:      Head: Normocephalic and atraumatic. Nose: Nose normal.   Eyes:      Extraocular Movements: Extraocular movements intact. Pupils: Pupils are equal, round, and reactive to light. Cardiovascular:      Rate and Rhythm: Normal rate and regular rhythm. Pulmonary:      Effort: Pulmonary effort is normal. No respiratory distress. Breath sounds: Normal breath sounds. Abdominal:      General: Abdomen is flat. There is no distension. Palpations: Abdomen is soft. There is no mass. Musculoskeletal:         General: No swelling. Normal range of motion. Cervical back: Normal range of motion. No rigidity. Skin:     General: Skin is warm and dry. Neurological:      General: No focal deficit present. Mental Status: He is alert. Mental status is at baseline. Cranial Nerves: No cranial nerve deficit. Psychiatric:      Comments: +SI         MEDICAL DECISION MAKIN-year-old male presents for evaluation of suicidal ideation. We will get some basic labs to medically clear and plan for psychiatric admission. Labs are unremarkable. Patient is medically cleared. CRITICAL CARE:       PROCEDURES:    Procedures    DIAGNOSTIC RESULTS   EKG:All EKG's are interpreted by the Emergency Department Physician who either signs or Co-signs this chart in the absence of a cardiologist.        RADIOLOGY:All plain film, CT, MRI, and formal ultrasound images (except ED bedside ultrasound) are read by the radiologist, see reports below, unless otherwisenoted in MDM or here. No orders to display     LABS: All lab results were reviewed by myself, and all abnormals are listed below.   Labs Reviewed   CBC WITH AUTO DIFFERENTIAL - Abnormal; Notable for the following components:       Result Value    WBC 11.8 (*)     RBC 4.39 (*)     Hematocrit 40.8 (*)     Seg Neutrophils 80 (*)     Lymphocytes 13 (*)     Segs Absolute 9.50 (*)     All other components within normal limits   COMPREHENSIVE METABOLIC PANEL W/ REFLEX TO MG FOR LOW K - Abnormal; Notable for the following components:    Glucose 141 (*)     Sodium 134 (*)     Chloride 97 (*)     Total Bilirubin 0.20 (*)     All other components within normal limits   URINALYSIS - Abnormal; Notable for the following components:    Urine Hgb 1+ (*)     All other components within normal limits   URINE DRUG SCREEN - Abnormal; Notable for the following components:    Cannabinoid Scrn, Ur POSITIVE (*)     All other components within normal limits   MICROSCOPIC URINALYSIS - Abnormal; Notable for the following components:    Bacteria, UA FEW (*)     All other components within normal limits   COVID-19, RAPID   ETHANOL       EMERGENCY DEPARTMENTCOURSE:         Vitals:    Vitals:    02/02/22 1343   BP: (!) 143/77   Pulse: 89   Resp: 20   Temp: 97.8 °F (36.6 °C)   TempSrc: Oral   SpO2: 99%       The patient was given the following medications while in the emergency department:  Orders Placed This Encounter   Medications    nicotine polacrilex (NICORETTE) gum 2 mg    LORazepam (ATIVAN) tablet 1 mg     CONSULTS:  IP CONSULT TO INTERNAL MEDICINE    FINAL IMPRESSION      1. Suicidal ideation          DISPOSITION/PLAN   DISPOSITION Admitted 02/02/2022 03:51:48 PM      PATIENT REFERRED TO:  No follow-up provider specified. DISCHARGE MEDICATIONS:  New Prescriptions    No medications on file     The care is provided during an unprecedented national emergency due to the novel coronavirus, COVID 19.   MD Mason Ferguson MD  02/02/22 0428

## 2022-02-02 NOTE — BH NOTE
On call provider notified of best practice advisory suggesting to place patient on suicide precautions.  Provider to discontinue the order as patient does not meet criteria for suicide precautions at this time

## 2022-02-02 NOTE — BH NOTE
Pt admitted to [facility] at 1630 RM # 103 per provider order. Pt scanned with metal detector. Nourishment provided. Patient admitted voluntary from [address] [place] [address]. Patient presented to the ED with increased anxiety, suicidal ideation with no specific plan, and paranoia that he is being watched and followed. Patient reports he has been off his medications/[facility] for over a month since his last discharge in December 2021. Patient recently lost his job, [employer] due to having panic attacks. Patient reports poor sleep and appetite. On admission, patient denies having thoughts to harm self or others. Patient reports increase anxiety and depression 8/10 with 10 being the worst, reports hearing voices and paranoia. Patient reports he receives Suboxone from [facility]. Provider paged for orders. Will monitor pt for safety and behavior.

## 2022-02-02 NOTE — ED NOTES
Dr Marcial House to admit under Acute psychosis and voluntary faxed to [facility].

## 2022-02-02 NOTE — ED NOTES
Provisional Diagnosis:   acute psychosis with paranoia / AH      Psychosocial and Contextual Factors:   reports off his medications and not taking any since last discharge      C-SSRS Summary:    [facility] admission 12/5-12/10/2021     Patient:   Family: X- Brother  Agency: [facility] did not follow up / linked with [facility] for Suboxone / past hx with [facility]      Substance Abuse:   Hx of Opiates / cannabis     Present Suicidal Behavior:    Ideations      Verbal:  X     Attempt:     Past Suicidal Behavior:  reports past attempts over 10 years ago     Verbal:  X     Attempt:  X        Self-Injurious/Self-Mutilation:    denies      Hx of Violence:  denies    Trauma Hx:     denies    Protective Factors:  housing with brother and friend, income, insurance, linked with [facility] [address] for suboxone     Risk Factors:  Poor insight, not compliant with [facility] and medications, poor coping/problem solving skills, recently lost job     Clinical Summary:   Adelina Warren is a single 55year old  male whom presented to the ED with increased anxiety, SI with no specific plan (reports ideations other day to overdose) and paranoia that he is being watched and followed, he denies HI. Patient reports he has been off his medications for over a month since his last discharge in December 2021. Patient reports he is only linked with [facility] for suboxone only and not linked for any medications. Patient reports he was not ready for discharge I December and rushed himself out of here and he needs to get back on medications to help his anxiety and paranoia. Patient denies any legal issues reports only cannabis use and reports poor sleep and poor appetite. Patient reports he lives with his brother and friend and is employed. Level of Care Disposition: To be medically cleared and ED physician to decide disposition.

## 2022-02-02 NOTE — BH NOTE
completed (first 3 are required if patient doesn't refuse):            ( )  Recognizing danger situations (included triggers and roadblocks)                    ( )  Coping skills (new ways to manage stress, exercise, relaxation techniques, changing routine, distraction)                                                           ( )  Basic information about quitting (benefits of quitting, techniques in how to quit, available resources  ( ) Referral for counseling faxed to [facility]                                           ( ) Patient refused counseling  (x ) Patient has not smoked in the last 30 days    Metabolic Screening:    Lab Results   Component Value Date    LABA1C 5.6 12/08/2021       Lab Results   Component Value Date    CHOL 137 12/08/2021    CHOL 219 (H) 05/04/2018    CHOL 159 07/17/2014     Lab Results   Component Value Date    TRIG 47 12/08/2021    TRIG 89 05/04/2018    TRIG 95 07/17/2014     Lab Results   Component Value Date    HDL 38 (L) 12/08/2021    HDL 39 (L) 05/04/2018    HDL 54 07/17/2014     No components found for: LDLCAL  No results found for: LABVLDL      Body mass index is 25.4 kg/m². BP Readings from Last 2 Encounters:   02/02/22 135/87   12/10/21 112/76           Pt admitted with followings belongings:  Dental Appliances: None  Vision - Corrective Lenses: None  Hearing Aid: None  Jewelry: None  Body Piercings Removed: N/A  Clothing: Footwear,Pants,Shirt,Socks,Undergarments (Comment),Other (Comment) (belt, hat)  Were All Patient Medications Collected?: Yes  Other Valuables: Cell phone,Money (Comment),Wallet     Patient's home medications needs to be verified and reviewed. Patient oriented to surroundings and program expectations and copy of patient rights given. Received admission packet:  Yes. Consents reviewed, signed all consents. Refused no consents. Patient verbalize understanding:  Yes.   Patient education on precautions: Yes      Pt admitted to unit [facility] at [address] per provider order. Pt scanned with metal detector. Nourishment provided. Patient admitted voluntary from [facility]. Patient presented to the ED with increased anxiety, suicidal ideation with no specific plan, and paranoia that he is being watched and followed. Patient reports he has been off his medications/Unison for over a month since his last discharge in December 2021. Patient recently lost his job, [employer] due to having panic attacks. Patient reports poor sleep and appetite. On admission, patient denies having thoughts to harm self or others. Patient reports increase anxiety and depression 8/10 with 10 being the worst, reports hearing voices and paranoia. Patient reports he receives Suboxone from [facility]. Provider paged for orders. Will monitor pt for safety and behavior.                     Erick Palmer RN

## 2022-02-02 NOTE — BH NOTE
Patient given tobacco quitline number 20704205654 at this time, refusing to call at this time, states \" I just dont want to quit now\"- patient given information as to the dangers of long term tobacco use. Continue to reinforce the importance of tobacco cessation.

## 2022-02-03 PROBLEM — F41.0 GENERALIZED ANXIETY DISORDER WITH PANIC ATTACKS: Status: ACTIVE | Noted: 2021-12-06

## 2022-02-03 PROBLEM — F12.10 CANNABIS ABUSE: Status: ACTIVE | Noted: 2022-02-03

## 2022-02-03 PROBLEM — F31.5 BIPOLAR I DISORDER, CURRENT OR MOST RECENT EPISODE DEPRESSED, WITH PSYCHOTIC FEATURES (HCC): Status: ACTIVE | Noted: 2022-02-03

## 2022-02-03 PROBLEM — F11.10 OPIOID ABUSE, EPISODIC (HCC): Status: ACTIVE | Noted: 2022-02-03

## 2022-02-03 PROCEDURE — 6370000000 HC RX 637 (ALT 250 FOR IP): Performed by: PSYCHIATRY & NEUROLOGY

## 2022-02-03 PROCEDURE — 6370000000 HC RX 637 (ALT 250 FOR IP): Performed by: NURSE PRACTITIONER

## 2022-02-03 PROCEDURE — 1240000000 HC EMOTIONAL WELLNESS R&B

## 2022-02-03 PROCEDURE — APPSS60 APP SPLIT SHARED TIME 46-60 MINUTES: Performed by: NURSE PRACTITIONER

## 2022-02-03 PROCEDURE — 90792 PSYCH DIAG EVAL W/MED SRVCS: CPT | Performed by: PSYCHIATRY & NEUROLOGY

## 2022-02-03 RX ORDER — BUPRENORPHINE AND NALOXONE 8; 2 MG/1; MG/1
1.5 FILM, SOLUBLE BUCCAL; SUBLINGUAL DAILY
Status: DISCONTINUED | OUTPATIENT
Start: 2022-02-03 | End: 2022-02-08 | Stop reason: HOSPADM

## 2022-02-03 RX ORDER — QUETIAPINE FUMARATE 25 MG/1
25 TABLET, FILM COATED ORAL 2 TIMES DAILY
Status: DISCONTINUED | OUTPATIENT
Start: 2022-02-03 | End: 2022-02-04

## 2022-02-03 RX ORDER — LISINOPRIL 20 MG/1
40 TABLET ORAL DAILY
Status: DISCONTINUED | OUTPATIENT
Start: 2022-02-03 | End: 2022-02-08 | Stop reason: HOSPADM

## 2022-02-03 RX ORDER — FLUTICASONE PROPIONATE 50 MCG
2 SPRAY, SUSPENSION (ML) NASAL DAILY
Status: DISCONTINUED | OUTPATIENT
Start: 2022-02-03 | End: 2022-02-08 | Stop reason: HOSPADM

## 2022-02-03 RX ORDER — RISPERIDONE 1 MG/1
0.5 TABLET, FILM COATED ORAL 2 TIMES DAILY
Status: DISCONTINUED | OUTPATIENT
Start: 2022-02-03 | End: 2022-02-03

## 2022-02-03 RX ORDER — CETIRIZINE HYDROCHLORIDE 10 MG/1
10 TABLET ORAL DAILY
Status: DISCONTINUED | OUTPATIENT
Start: 2022-02-03 | End: 2022-02-08 | Stop reason: HOSPADM

## 2022-02-03 RX ORDER — ATORVASTATIN CALCIUM 20 MG/1
40 TABLET, FILM COATED ORAL NIGHTLY
Status: DISCONTINUED | OUTPATIENT
Start: 2022-02-03 | End: 2022-02-08 | Stop reason: HOSPADM

## 2022-02-03 RX ORDER — LANOLIN ALCOHOL/MO/W.PET/CERES
1.5 CREAM (GRAM) TOPICAL NIGHTLY PRN
Status: DISCONTINUED | OUTPATIENT
Start: 2022-02-03 | End: 2022-02-08 | Stop reason: HOSPADM

## 2022-02-03 RX ORDER — ALBUTEROL SULFATE 90 UG/1
2 AEROSOL, METERED RESPIRATORY (INHALATION) EVERY 6 HOURS PRN
Status: DISCONTINUED | OUTPATIENT
Start: 2022-02-03 | End: 2022-02-08 | Stop reason: HOSPADM

## 2022-02-03 RX ORDER — AMLODIPINE BESYLATE 5 MG/1
5 TABLET ORAL DAILY
Status: DISCONTINUED | OUTPATIENT
Start: 2022-02-03 | End: 2022-02-08 | Stop reason: HOSPADM

## 2022-02-03 RX ORDER — PANTOPRAZOLE SODIUM 40 MG/1
40 TABLET, DELAYED RELEASE ORAL
Status: DISCONTINUED | OUTPATIENT
Start: 2022-02-04 | End: 2022-02-08 | Stop reason: HOSPADM

## 2022-02-03 RX ORDER — TRAZODONE HYDROCHLORIDE 50 MG/1
50 TABLET ORAL NIGHTLY PRN
Status: DISCONTINUED | OUTPATIENT
Start: 2022-02-03 | End: 2022-02-08 | Stop reason: HOSPADM

## 2022-02-03 RX ADMIN — LISINOPRIL 40 MG: 20 TABLET ORAL at 10:12

## 2022-02-03 RX ADMIN — ATORVASTATIN CALCIUM 40 MG: 20 TABLET, FILM COATED ORAL at 21:34

## 2022-02-03 RX ADMIN — BUPRENORPHINE AND NALOXONE 1.5 FILM: 8; 2 FILM BUCCAL; SUBLINGUAL at 10:13

## 2022-02-03 RX ADMIN — HYDROXYZINE HYDROCHLORIDE 50 MG: 50 TABLET, FILM COATED ORAL at 08:13

## 2022-02-03 RX ADMIN — TRAZODONE HYDROCHLORIDE 50 MG: 50 TABLET ORAL at 01:19

## 2022-02-03 RX ADMIN — HYDROXYZINE HYDROCHLORIDE 50 MG: 50 TABLET, FILM COATED ORAL at 16:36

## 2022-02-03 RX ADMIN — VORTIOXETINE 10 MG: 10 TABLET, FILM COATED ORAL at 10:09

## 2022-02-03 RX ADMIN — QUETIAPINE FUMARATE 25 MG: 25 TABLET ORAL at 21:34

## 2022-02-03 RX ADMIN — CETIRIZINE HYDROCHLORIDE TABLETS 10 MG: 10 TABLET, FILM COATED ORAL at 10:09

## 2022-02-03 RX ADMIN — NICOTINE POLACRILEX 2 MG: 2 GUM, CHEWING BUCCAL at 15:11

## 2022-02-03 RX ADMIN — NICOTINE POLACRILEX 2 MG: 2 GUM, CHEWING BUCCAL at 08:17

## 2022-02-03 RX ADMIN — AMLODIPINE BESYLATE 5 MG: 5 TABLET ORAL at 10:12

## 2022-02-03 RX ADMIN — QUETIAPINE FUMARATE 25 MG: 25 TABLET ORAL at 10:10

## 2022-02-03 RX ADMIN — HYDROXYZINE HYDROCHLORIDE 50 MG: 50 TABLET, FILM COATED ORAL at 00:51

## 2022-02-03 ASSESSMENT — LIFESTYLE VARIABLES: HISTORY_ALCOHOL_USE: NO

## 2022-02-03 NOTE — PROGRESS NOTES
Behavioral Services  Medicare Certification Upon Admission    I certify that this patient's inpatient psychiatric hospital admission is medically necessary for:    [x] (1) Treatment which could reasonably be expected to improve this patient's condition,       [x] (2) Or for diagnostic study;     AND     [x](2) The inpatient psychiatric services are provided while the individual is under the care of a physician and are included in the individualized plan of care.     Estimated length of stay/service 33-5 days    Plan for post-hospital care hc    Electronically signed by Maria Alejandra Harrington MD on 2/3/2022 at 9:42 AM

## 2022-02-03 NOTE — PLAN OF CARE
[facility]  Initial Interdisciplinary Treatment Plan NO      Original treatment plan Date & Time: 2/3/2022   0856    Admission Type:  Admission Type: Voluntary    Reason for admission:   Reason for Admission: Presented to the ED with increased anxiety, SI with no specific plan, and paranoia that he is being watched and followed. Patient reports he has been off his medications/Unison for over a month since his last discharge in December 2021. Patient recently lost his job, [employer] due to having panic attacks.     Estimated Length of Stay:  5-7days  Estimated Discharge Date: to be determined by physician    PATIENT STRENGTHS:  Patient Strengths:Strengths: Social Skills,Communication  Patient Strengths and Limitations:Limitations: Hopeless about future,External locus of control,Difficult relationships / poor social skills  Addictive Behavior: Addictive Behavior  In the past 3 months, have you felt or has someone told you that you have a problem with:  : None  Do you have a history of Chemical Use?: No  Do you have a history of Alcohol Use?: No  Do you have a history of Street Drug Abuse?: No  Histroy of Prescripton Drug Abuse?: Comment (opiates-past)  Medical Problems:  Past Medical History:   Diagnosis Date    Bipolar disorder ([facility] [address]     Hypertension     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     Status EXAM:Status and Exam  Normal: No  Facial Expression: Sad,Flat  Affect: Appropriate,Blunt  Level of Consciousness: Lethargic  Mood:Normal: No  Mood: Depressed,Ambivalent  Motor Activity:Normal: No  Motor Activity: Decreased  Interview Behavior: Cooperative  Preception: [place] to Person,[place] to Time,[place] to Place,[place] to Situation  Attention:Normal: Yes  Attention: Distractible  Thought Processes: Circumstantial  Thought Content:Normal: No  Thought Content: Preoccupations  Hallucinations: None  Delusions: No  Memory:Normal: Yes  Insight and Judgment: No  Insight and Judgment: Unmotivated,Poor Insight,Poor Judgment  Present Suicidal Ideation: No  Present Homicidal Ideation: No    EDUCATION:   Learner Progress Toward Treatment Goals: reviewed group plans and strategies for care    Method:group therapy, medication compliance, individualized assessments and care planning    Outcome: needs reinforcement    PATIENT GOALS: to be discussed with patient within 72 hours    PLAN/TREATMENT RECOMMENDATIONS:     continue group therapy , medications compliance, goal setting, individualized assessments and care, continue to monitor pt on unit      SHORT-TERM GOALS:   Time frame for Short-Term Goals: 5-7 days    LONG-TERM GOALS:  Time frame for Long-Term Goals: 6 months  Members Present in Team Meeting: See Signature Sheet    Valente Munoz

## 2022-02-03 NOTE — PLAN OF CARE
Problem: Altered Mood, Depressive Behavior:  Goal: Able to verbalize and/or display a decrease in depressive symptoms  Description: Able to verbalize and/or display a decrease in depressive symptoms  Outcome: Ongoing  Note: Pt denies having depression or anxiety. Pt denies having suicidal or homicidal ideation. Pt is cooperative with staff and compliant with medical treatment. Pt was isolative for first part of shift but was out more in the afternoon. Pt reports having poor sleep but has adequate diet. Pt is compliant with medical treatment. Problem: Altered Mood, Manic Behavior:  Goal: Able to sleep  Description: Able to sleep  Outcome: Ongoing  Note: Pt was seen sleeping for intervals at a time.

## 2022-02-03 NOTE — PLAN OF CARE
Problem: Altered Mood, Depressive Behavior:  Goal: Able to verbalize and/or display a decrease in depressive symptoms  Description: Able to verbalize and/or display a decrease in depressive symptoms  Outcome: Ongoing   Pt rests in his room quietly this shift, he is out of his room briefly for needs only, reports he is tired and that he only wants to rest  Problem: Altered Mood, Depressive Behavior:  Goal: Ability to disclose and discuss suicidal ideas will improve  Description: Ability to disclose and discuss suicidal ideas will improve  Outcome: Ongoing  Pt denies thoughts of harming themself and verbally agrees to remain safe while on the unit.  No self harming behaviors are noted this shift    Problem: Altered Mood, Manic Behavior:  Goal: Able to sleep  Description: Able to sleep  Outcome: Ongoing   Pt rests in his room quietly this shift
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
## 2022-02-03 NOTE — GROUP NOTE
Group Therapy Note    Date: 2/3/2022    Group Start Time: 1000  Group End Time: 1635  Group Topic: Psychotherapy    CHARLES SIFUENTES    JAIMEE Greer, CAROLEE        Group Therapy Note    Attendees: 6/9         Patient's Goal:  Increase interpersonal relationship skills    Notes:  Patient was an active participant in group discussion- occasionally was monopolizing of discussion but not intentional    Status After Intervention:  Unchanged    Participation Level:  Active Listener and Interactive    Participation Quality: Appropriate, Attentive and Sharing      Speech:  normal      Thought Process/Content: Logical  Linear      Affective Functioning: Congruent      Mood: depressed      Level of consciousness:  Alert, Oriented x4 and Attentive      Response to Learning: Able to verbalize current knowledge/experience and Able to verbalize/acknowledge new learning      Endings: None Reported    Modes of Intervention: Support, Socialization and Exploration      Discipline Responsible: /Counselor      Signature:  JAIMEE Greer, CAROLEE

## 2022-02-03 NOTE — GROUP NOTE
Group Therapy Note    Date: 2/3/2022    Group Start Time: 1115  Group End Time: 1200  Group Topic: Music Therapy    CHARLES SIFUENTES    Lukas Fishman        Group Therapy Note    Attendees: 7/9         Patient's Goal:  Patients shared preferred music and offered advice based on their music selections. Goals to engage in peers support; Increase self-expression; Normalize the environment    Notes:  Patient attended and participated in group having positive interactions with peers and staff. Patient shared music and engaged in conversations about advice and topics within lyrics of their song selection. Status After Intervention:  Improved    Participation Level:  Active Listener and Interactive    Participation Quality: Appropriate, Attentive and Sharing      Speech:  normal      Thought Process/Content: Logical  Linear      Affective Functioning: Congruent      Mood: euthymic      Level of consciousness:  Alert and Attentive      Response to Learning: Able to verbalize current knowledge/experience, Capable of insight and Progressing to goal      Endings: None Reported    Modes of Intervention: Support, Socialization, Exploration, Activity, Media and Reality-testing      Discipline Responsible: Psychoeducational Specialist      Signature:  Luksa Fishman    Signature:  Lukas Fishman

## 2022-02-04 PROCEDURE — APPSS30 APP SPLIT SHARED TIME 16-30 MINUTES: Performed by: NURSE PRACTITIONER

## 2022-02-04 PROCEDURE — 6370000000 HC RX 637 (ALT 250 FOR IP): Performed by: PSYCHIATRY & NEUROLOGY

## 2022-02-04 PROCEDURE — 99223 1ST HOSP IP/OBS HIGH 75: CPT | Performed by: INTERNAL MEDICINE

## 2022-02-04 PROCEDURE — 99232 SBSQ HOSP IP/OBS MODERATE 35: CPT | Performed by: PSYCHIATRY & NEUROLOGY

## 2022-02-04 PROCEDURE — 1240000000 HC EMOTIONAL WELLNESS R&B

## 2022-02-04 PROCEDURE — 6370000000 HC RX 637 (ALT 250 FOR IP): Performed by: NURSE PRACTITIONER

## 2022-02-04 RX ORDER — QUETIAPINE FUMARATE 50 MG/1
50 TABLET, FILM COATED ORAL 3 TIMES DAILY
Status: DISCONTINUED | OUTPATIENT
Start: 2022-02-04 | End: 2022-02-05

## 2022-02-04 RX ORDER — QUETIAPINE FUMARATE 50 MG/1
50 TABLET, FILM COATED ORAL ONCE
Status: COMPLETED | OUTPATIENT
Start: 2022-02-04 | End: 2022-02-04

## 2022-02-04 RX ADMIN — HYDROXYZINE HYDROCHLORIDE 50 MG: 50 TABLET, FILM COATED ORAL at 03:03

## 2022-02-04 RX ADMIN — TRAZODONE HYDROCHLORIDE 50 MG: 50 TABLET ORAL at 20:20

## 2022-02-04 RX ADMIN — NICOTINE POLACRILEX 2 MG: 2 GUM, CHEWING BUCCAL at 13:03

## 2022-02-04 RX ADMIN — NICOTINE POLACRILEX 2 MG: 2 GUM, CHEWING BUCCAL at 18:35

## 2022-02-04 RX ADMIN — VORTIOXETINE 10 MG: 10 TABLET, FILM COATED ORAL at 08:17

## 2022-02-04 RX ADMIN — NICOTINE POLACRILEX 2 MG: 2 GUM, CHEWING BUCCAL at 08:22

## 2022-02-04 RX ADMIN — ATORVASTATIN CALCIUM 40 MG: 20 TABLET, FILM COATED ORAL at 20:20

## 2022-02-04 RX ADMIN — PANTOPRAZOLE SODIUM 40 MG: 40 TABLET, DELAYED RELEASE ORAL at 08:18

## 2022-02-04 RX ADMIN — BUPRENORPHINE AND NALOXONE 1.5 FILM: 8; 2 FILM BUCCAL; SUBLINGUAL at 08:22

## 2022-02-04 RX ADMIN — FLUTICASONE PROPIONATE 2 SPRAY: 50 SPRAY, METERED NASAL at 08:20

## 2022-02-04 RX ADMIN — TRAZODONE HYDROCHLORIDE 50 MG: 50 TABLET ORAL at 03:03

## 2022-02-04 RX ADMIN — NICOTINE POLACRILEX 2 MG: 2 GUM, CHEWING BUCCAL at 10:56

## 2022-02-04 RX ADMIN — QUETIAPINE FUMARATE 50 MG: 50 TABLET ORAL at 20:20

## 2022-02-04 RX ADMIN — AMLODIPINE BESYLATE 5 MG: 5 TABLET ORAL at 08:19

## 2022-02-04 RX ADMIN — HYDROXYZINE HYDROCHLORIDE 50 MG: 50 TABLET, FILM COATED ORAL at 20:20

## 2022-02-04 RX ADMIN — CETIRIZINE HYDROCHLORIDE TABLETS 10 MG: 10 TABLET, FILM COATED ORAL at 08:19

## 2022-02-04 RX ADMIN — LISINOPRIL 40 MG: 20 TABLET ORAL at 08:18

## 2022-02-04 RX ADMIN — QUETIAPINE FUMARATE 25 MG: 25 TABLET ORAL at 08:20

## 2022-02-04 RX ADMIN — QUETIAPINE FUMARATE 50 MG: 50 TABLET ORAL at 18:35

## 2022-02-04 RX ADMIN — HYDROXYZINE HYDROCHLORIDE 50 MG: 50 TABLET, FILM COATED ORAL at 13:02

## 2022-02-04 ASSESSMENT — PAIN DESCRIPTION - PAIN TYPE: TYPE: CHRONIC PAIN

## 2022-02-04 ASSESSMENT — PAIN DESCRIPTION - LOCATION: LOCATION: GENERALIZED

## 2022-02-04 ASSESSMENT — PAIN SCALES - GENERAL: PAINLEVEL_OUTOF10: 3

## 2022-02-04 NOTE — GROUP NOTE
Group Therapy Note    Date: 2/4/2022    Group Start Time: 1110  Group End Time: 1200  Group Topic: Recreational    STCZ SONIDO Skelton        Group Therapy Note    Attendees: 5/9       Patient's Goal:  Patients engaged in game group to increase socialization; Increase sense of community; And normalize the environemnt    Notes:  Augustus attended and participated in group having positive interactions with peers and staff throughout    Status After Intervention:  Improved    Participation Level:  Active Listener and Interactive    Participation Quality: Appropriate and Attentive      Speech:  normal      Thought Process/Content: Logical  Linear      Affective Functioning: Congruent      Mood: euthymic      Level of consciousness:  Alert and Attentive      Response to Learning: Able to verbalize current knowledge/experience and Progressing to goal      Endings: None Reported    Modes of Intervention: Socialization, Activity and Reality-testing      Discipline Responsible: Psychoeducational Specialist      Signature:  Opal Skelton

## 2022-02-04 NOTE — GROUP NOTE
Group Therapy Note    Date: 2/4/2022    Group Start Time: 8407  Group End Time: 56  Group Topic: Psychotherapy    CHARLES SIFUENTES    JAIMEE Canada LSW        Group Therapy Note    Attendees: 7/10         Patient's Goal: Increase interpersonal relationship skills    Notes: Patient was an active participant in group discussion    Status After Intervention:  Unchanged    Participation Level:  Active Listener and Interactive    Participation Quality: Appropriate, Attentive, Sharing and Supportive      Speech:  normal      Thought Process/Content: Logical  Linear      Affective Functioning: Congruent      Mood: euthymic      Level of consciousness:  Alert, Oriented x4 and Attentive      Response to Learning: Able to verbalize current knowledge/experience, Able to verbalize/acknowledge new learning and Able to retain information      Endings: None Reported    Modes of Intervention: Support, Socialization and Exploration      Discipline Responsible: /Counselor      Signature:  JAIMEE Canada LSW

## 2022-02-04 NOTE — PLAN OF CARE
Problem: Altered Mood, Depressive Behavior:  Goal: Able to verbalize and/or display a decrease in depressive symptoms  Description: Able to verbalize and/or display a decrease in depressive symptoms  2/3/2022 2024 by Margaret Frazier LPN  Outcome: Ongoing     Problem: Altered Mood, Depressive Behavior:  Goal: Ability to disclose and discuss suicidal ideas will improve  Description: Ability to disclose and discuss suicidal ideas will improve  Outcome: Ongoing   Reports continued depression and is isolated to room. Denies suicidal thoughts and remains free from harm at this time.

## 2022-02-04 NOTE — H&P
Formerly [facility]    CONSULTATION / HISTORY AND PHYSICAL EXAMINATION            Date:   2/4/2022  Patient name:  Jessica Villanueva  Date of admission:  2/2/2022  1:44 PM  MRN:   [id number]  Account:  [de-identified]  YOB: 1965  PCP:    No primary care provider on file. Room:   [address] [place] [address]  Code Status:    Full Code    Physician Requesting Consult: Christian Munguia, *    Reason for Consult:  medical management    Chief Complaint:     Chief Complaint   Patient presents with    Suicidal   htn  hld      History Obtained From:     Patient medical record nursing staff    History of Present Illness:     HTN  Onset more than 2 years ago  john mild to mod  Controlled with current po meds  Not associated with headaches or blurry vision  No chest pain    HLD  Onset more than 5 years ago  Severity is mild, not getting worse  Not associated with pancreatitis  Tolerating statin well no muscle pain        Past Medical History:     Past Medical History:   Diagnosis Date    Bipolar disorder ([facility] [address]     Hypertension     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 No pertinent surgical history. Medications Prior to Admission:     Prior to Admission medications    Medication Sig Start Date End Date Taking?  Authorizing Provider   atorvastatin (LIPITOR) 40 MG tablet Take 1 tablet by mouth nightly 12/9/21   Shauna Polanco MD   cetirizine (ZYRTEC) 10 MG tablet Take 1 tablet by mouth daily 12/9/21   Shauna Polanco MD   fluticasone (FLONASE) 50 MCG/ACT nasal spray instill 1 spray into each nostril once daily 12/9/21   Shauna Polanco MD   ibuprofen (ADVIL;MOTRIN) 400 MG tablet Take 1 tablet by mouth every 6 hours as needed for Pain 12/9/21   Shauna Polanco MD   lisinopril (PRINIVIL;ZESTRIL) 40 MG tablet Take 1 tablet by mouth daily 12/10/21   Shauna Polanco MD   melatonin 3 MG TABS tablet Take 0.5 tablets by mouth nightly as needed (insomnia) 12/9/21   Fallon Patel MD   omeprazole (PRILOSEC) 20 MG delayed release capsule Take 1 capsule by mouth Daily 12/9/21   Fallon Patel MD   risperiDONE (RISPERDAL) 0.5 MG tablet Take 1 tablet by mouth 2 times daily 12/9/21   Fallon Patel MD   VENTOLIN  (90 Base) MCG/ACT inhaler Inhale 2 puffs into the lungs every 6 hours as needed for Wheezing 12/9/21   Fallon Patel MD   amLODIPine (NORVASC) 5 MG tablet take 1 tablet by mouth once daily 4/4/21   Maksim Luna MD        Allergies:     Skelaxin [metaxalone]    Social History:     Tobacco:    reports that he has been smoking pipe and cigarettes. He started smoking about 19 years ago. He has a 33.00 pack-year smoking history. He has never used smokeless tobacco.  Alcohol:      reports previous alcohol use. Drug Use:  reports previous drug use. Drug: Cocaine. Family History:     Family History   Problem Relation Age of Onset    Bipolar Disorder Mother     Alcohol Abuse Mother         Liver cirrhosis    Coronary Art Dis Brother     Hypertension Other         Sibling    Seizures Other         Siblings    Hypertension Father     Stroke Father        Review of Systems:     Positive and Negative as described in HPI. CONSTITUTIONAL:  negative for fevers, chills, sweats, fatigue, weight loss  HEENT:  negative for vision, hearing changes, runny nose, throat pain  RESPIRATORY:  negative for shortness of breath, cough, congestion, wheezing. CARDIOVASCULAR:  negative for chest pain, palpitations.   GASTROINTESTINAL:  negative for nausea, vomiting, diarrhea, constipation, change in bowel habits, abdominal pain   GENITOURINARY:  negative for difficulty of urination, burning with urination, frequency   INTEGUMENT:  negative for rash, skin lesions, easy bruising   HEMATOLOGIC/LYMPHATIC:  negative for swelling/edema   ALLERGIC/IMMUNOLOGIC:  negative for urticaria , itching  ENDOCRINE:  negative increase in drinking, increase in urination, hot or cold intolerance  MUSCULOSKELETAL:  negative joint pains, muscle aches, swelling of joints  NEUROLOGICAL:  negative for headaches, dizziness, lightheadedness, numbness, pain, tingling extremities      Physical Exam:     /69   Pulse 67   Temp 97.8 °F (36.6 °C) (Oral)   Resp 14   Ht 5' 10\" (1.778 m)   Wt 177 lb (80.3 kg)   SpO2 100%   BMI 25.40 kg/m²   Temp (24hrs), Av.2 °F (36.8 °C), Min:97.8 °F (36.6 °C), Max:98.6 °F (37 °C)    No results for input(s): POCGLU in the last 72 hours. No intake or output data in the 24 hours ending 22 1521  Left side of face small 1cm skin lesion   ruleout basal cell ca    General Appearance:  alert, well appearing, and in no acute distress  Mental status: oriented to person, place, and time with normal affect  Head:  normocephalic, atraumatic. Eye: no icterus, redness, pupils equal and reactive, extraocular eye movements intact, conjunctiva clear  Ear: normal external ear, no discharge, hearing intact  Nose:  no drainage noted  Mouth: mucous membranes moist  Neck: supple, no carotid bruits, thyroid not palpable  Lungs: Bilateral equal air entry, clear to ausculation, no wheezing, rales or rhonchi, normal effort  Cardiovascular: normal rate, regular rhythm, no murmur, gallop, rub. Abdomen: Soft, nontender, nondistended, normal bowel sounds, no hepatomegaly or splenomegaly  Neurologic: There are no new focal motor or sensory deficits, normal muscle tone and bulk, no abnormal sensation, normal speech, cranial nerves II through XII grossly intact  Skin: No gross lesions, rashes, bruising or bleeding on exposed skin area  Extremities:  peripheral pulses palpable, no pedal edema or calf pain with palpation      Investigations:      Laboratory Testing:  No results found for this or any previous visit (from the past 24 hour(s)).         Consultations:   IP CONSULT TO INTERNAL MEDICINE  Assessment :      Primary Problem  Bipolar I disorder, current or most recent episode depressed, with psychotic features [facility])    Active Hospital Problems    Diagnosis Date Noted    Bipolar I disorder, current or most recent episode depressed, with psychotic features ([facility] [F31.5] 02/03/2022    Cannabis abuse [F12.10] 02/03/2022    Opioid abuse, episodic ([facility] [F11.10] 02/03/2022    Generalized anxiety disorder with panic attacks [F41.1, F41.0] 12/06/2021    Chronic back pain [M54.9, G89.29] 07/18/2014    Chronic diarrhea [K52.9] 07/18/2014    GERD (gastroesophageal reflux disease) [K21.9] 07/18/2014    HTN (hypertension) [I10] 07/18/2014       Plan:     Hypertension start Norvasc 5 mg and lisinopril 40 mg  Hyperlipidemia start Lipitor 40 mg  History of gastroesophageal reflux restart pantoprazole 40 mg  Hyponatremia sodium 134 check TSH and cortisol levels  History of prediabetes A1c 6.0 diet and exercise advised outpatient follow-up with PCP  Left side of nose possible skin cancer needs bx  Advised to see dermatology out pt        Sudarshan Silva MD  2/4/2022  3:21 PM    Copy sent to Dr. Itzel Georges primary care provider on file. Please note that this chart was generated using voice recognition Dragon dictation software. Although every effort was made to ensure the accuracy of this automated transcription, some errors in transcription may have occurred.

## 2022-02-04 NOTE — PROGRESS NOTES
Daily Progress Note  2/4/2022    Patient Name: Ros Moran    CHIEF COMPLAINT: Depression with suicidal ideation with plan and intent to overdose on medications         Emergency Medications: None     Scheduled psychiatric medications: Adherent with Suboxone, Seroquel, Trintellix    SUBJECTIVE:   Patient was seen today for follow-up assessment in his assigned room. Nursing staff report he has been cooperative and active in the milieu. His interactions with peers and staff have been appropriate. Patient was resting in bed on approach with his eyes covered by a pillow. He was awake and agreed to assessment but was reluctant to engage in sustained conversation. Patient is endorsing ongoing suicidal ideation today and reports that it \"comes and goes\". He is still expressing some paranoia that someone wants to harm him but is hopeful it will continue to improve as his medication is adjusted. He described his mood today as \"okay\". He is endorsing high levels of depression and anxiety and rates them both a 7/10, 10 being the worst.  He endorses poor sleep last night and stated he had difficulty falling asleep and staying asleep. He denies any symptoms of withdrawal currently. Patient denies auditory and visual hallucinations. He agrees to contract for safety on the unit however at this time he is unable to contract for safety in the community.     Appetite:  [x] Normal/Adequate/Unchanged  [] Increased  [] Decreased      Sleep:       [] Normal/Adequate/Unchanged  [] Fair  [x] Poor      Group Attendance on Unit:   [x] Yes  [] Selectively    [] No    Medication Side Effects: Denies         Mental Status Exam  Level of consciousness: Alert and awake   Appearance: Appropriate attire for setting, resting in bed, with fair grooming and hygiene   Behavior/Motor: Approachable, no psychomotor abnormalities  Attitude toward examiner: Somewhat cooperative, attentive, poor eye contact  Speech: normal rate and normal volume with depressed tone  Mood: Depressed  Affect: Blunted  Thought processes: linear and coherent   Thought content: Denies homicidal ideation  Suicidal Ideation: Endorses ongoing; contracts for safety on unit  Delusions: Expresses paranoia; some improvement  Perceptual Disturbance: patient is not observed responding to internal stimuli; denies AVH  Cognition: Oriented to self, location, time, and situation  Memory: intact  Insight & Judgement: poor     Data   height is 5' 10\" (1.778 m) and weight is 177 lb (80.3 kg). His oral temperature is 97.8 °F (36.6 °C). His blood pressure is 137/69 and his pulse is 67. His respiration is 14 and oxygen saturation is 100%.    Labs:   Admission on 02/02/2022   Component Date Value Ref Range Status    WBC 02/02/2022 11.8* 3.5 - 11.0 k/uL Final    RBC 02/02/2022 4.39* 4.5 - 5.9 m/uL Final    Hemoglobin 02/02/2022 13.6  13.5 - 17.5 g/dL Final    Hematocrit 02/02/2022 40.8* 41 - 53 % Final    MCV 02/02/2022 92.9  80 - 100 fL Final    MCH 02/02/2022 31.1  26 - 34 pg Final    MCHC 02/02/2022 33.4  31 - 37 g/dL Final    RDW 02/02/2022 14.6  11.5 - 14.9 % Final    Platelets 27/22/6008 320  150 - 450 k/uL Final    MPV 02/02/2022 6.1  6.0 - 12.0 fL Final    NRBC Automated 02/02/2022 NOT REPORTED  per 100 WBC Final    Differential Type 02/02/2022 NOT REPORTED   Final    Seg Neutrophils 02/02/2022 80* 36 - 66 % Final    Lymphocytes 02/02/2022 13* 24 - 44 % Final    Monocytes 02/02/2022 5  1 - 7 % Final    Eosinophils % 02/02/2022 1  0 - 4 % Final    Basophils 02/02/2022 1  0 - 2 % Final    Immature Granulocytes 02/02/2022 NOT REPORTED  0 % Final    Segs Absolute 02/02/2022 9.50* 1.3 - 9.1 k/uL Final    Absolute Lymph # 02/02/2022 1.50  1.0 - 4.8 k/uL Final    Absolute Mono # 02/02/2022 0.60  0.1 - 1.3 k/uL Final    Absolute Eos # 02/02/2022 0.10  0.0 - 0.4 k/uL Final    Basophils Absolute 02/02/2022 0.10  0.0 - 0.2 k/uL Final    Absolute Immature Granulocyte 02/02/2022 NOT REPORTED  0.00 - 0.30 k/uL Final    WBC Morphology 02/02/2022 NOT REPORTED   Final    RBC Morphology 02/02/2022 NOT REPORTED   Final    Platelet Estimate 32/04/2570 NOT REPORTED   Final    Glucose 02/02/2022 141* 70 - 99 mg/dL Final    BUN 02/02/2022 13  6 - 20 mg/dL Final    CREATININE 02/02/2022 0.83  0.70 - 1.20 mg/dL Final    Bun/Cre Ratio 02/02/2022 NOT REPORTED  9 - 20 Final    Calcium 02/02/2022 9.3  8.6 - 10.4 mg/dL Final    Sodium 02/02/2022 134* 135 - 144 mmol/L Final    Potassium 02/02/2022 3.8  3.7 - 5.3 mmol/L Final    Chloride 02/02/2022 97* 98 - 107 mmol/L Final    CO2 02/02/2022 25  20 - 31 mmol/L Final    Anion Gap 02/02/2022 12  9 - 17 mmol/L Final    Alkaline Phosphatase 02/02/2022 75  40 - 129 U/L Final    ALT 02/02/2022 6  5 - 41 U/L Final    AST 02/02/2022 10  <40 U/L Final    Total Bilirubin 02/02/2022 0.20* 0.3 - 1.2 mg/dL Final    Total Protein 02/02/2022 6.6  6.4 - 8.3 g/dL Final    Albumin 02/02/2022 4.2  3.5 - 5.2 g/dL Final    Albumin/Globulin Ratio 02/02/2022 NOT REPORTED  1.0 - 2.5 Final    GFR Non- 02/02/2022 >60  >60 mL/min Final    GFR  02/02/2022 >60  >60 mL/min Final    GFR Comment 02/02/2022        Final    Comment: Average GFR for 52-63 years old:   80 mL/min/1.73sq m  Chronic Kidney Disease:   <60 mL/min/1.73sq m  Kidney failure:   <15 mL/min/1.73sq m              eGFR calculated using average adult body mass.  Additional eGFR calculator available at:        [email or web address]            GFR Staging 02/02/2022 NOT REPORTED   Final    Color, UA 02/02/2022 Yellow  Yellow Final    Turbidity UA 02/02/2022 Clear  Clear Final    Glucose, Ur 02/02/2022 NEGATIVE  NEGATIVE Final    Bilirubin Urine 02/02/2022 NEGATIVE  NEGATIVE Final    Ketones, Urine 02/02/2022 NEGATIVE  NEGATIVE Final    Specific Gravity, UA 02/02/2022 1.016  1.000 - 1.030 Final    Urine Hgb 02/02/2022 1+* NEGATIVE Final    pH, UA 02/02/2022 6.5  5.0 - 8.0 Final    Protein, UA 02/02/2022 NEGATIVE  NEGATIVE Final    Urobilinogen, Urine 02/02/2022 Normal  Normal Final    Nitrite, Urine 02/02/2022 NEGATIVE  NEGATIVE Final    Leukocyte Esterase, Urine 02/02/2022 NEGATIVE  NEGATIVE Final    Urinalysis Comments 02/02/2022 NOT REPORTED   Final    Ethanol 02/02/2022 <10  <10 mg/dL Final    Ethanol percent 02/02/2022 <0.010  % Final    Amphetamine Screen, Ur 02/02/2022 NEGATIVE  NEGATIVE Final    Comment:       (Positive cutoff 1000 ng/mL)                  Barbiturate Screen, Ur 02/02/2022 NEGATIVE  NEGATIVE Final    Comment:       (Positive cutoff 200 ng/mL)                  Benzodiazepine Screen, Urine 02/02/2022 NEGATIVE  NEGATIVE Final    Comment:       (Positive cutoff 200 ng/mL)                  Cocaine Metabolite, Urine 02/02/2022 NEGATIVE  NEGATIVE Final    Comment:       (Positive cutoff 300 ng/mL)                  Methadone Screen, Urine 02/02/2022 NEGATIVE  NEGATIVE Final    Comment:       (Positive cutoff 300 ng/mL)                  Opiates, Urine 02/02/2022 NEGATIVE  NEGATIVE Final    Comment:       (Positive cutoff 300 ng/mL)                  Phencyclidine, Urine 02/02/2022 NEGATIVE  NEGATIVE Final    Comment:       (Positive cutoff 25 ng/mL)                  Propoxyphene, Urine 02/02/2022 NOT REPORTED  NEGATIVE Final    Cannabinoid Scrn, Ur 02/02/2022 POSITIVE* NEGATIVE Final    Comment:       (Positive cutoff 50 ng/mL)                  Oxycodone Screen, Ur 02/02/2022 NEGATIVE  NEGATIVE Final    Comment:       (Positive cutoff 100 ng/mL)                  Methamphetamine, Urine 02/02/2022 NOT REPORTED  NEGATIVE Final    Tricyclic Antidepressants, Urine 02/02/2022 NOT REPORTED  NEGATIVE Final    MDMA, Urine 02/02/2022 NOT REPORTED  NEGATIVE Final    Buprenorphine Urine 02/02/2022 NOT REPORTED  NEGATIVE Final    Test Information 02/02/2022 Assay provides medical screening only.   The absence of expected drug(s) and/or metabolite(s) may indicate diluted or adulterated urine, limitations of testing or timing of collection. Final    Comment: Testing for legal purposes should be confirmed by another method. To request confirmation   of test result, please call the lab within 7 days of sample submission.  Specimen Description 02/02/2022 . NASOPHARYNGEAL SWAB   Final    SARS-CoV-2, Rapid 02/02/2022 Not Detected  Not Detected Final    Comment:       Rapid NAAT:  The specimen is NEGATIVE for SARS-CoV-2, the novel coronavirus associated with   COVID-19. The ID NOW COVID-19 assay is designed to detect the virus that causes COVID-19 in patients   with signs and symptoms of infection who are suspected of COVID-19. An individual without symptoms of COVID-19 and who is not shedding SARS-CoV-2 virus would   expect to have a negative (not detected) result in this assay. Negative results should be treated as presumptive and, if inconsistent with clinical signs   and symptoms or necessary for patient management,  should be tested with an alternative molecular assay. Negative results do not preclude   SARS-CoV-2 infection and   should not be used as the sole basis for patient management decisions.          Fact sheet for Healthcare Providers: [employer]  Fact sheet for Patients: [employer]          Methodology: Isothermal Nucleic Acid Amplification      - 02/02/2022        Final    WBC, UA 02/02/2022 0 TO 2  /HPF Final    RBC, UA 02/02/2022 5 TO 10  /HPF Final    Casts UA 02/02/2022 NOT REPORTED  /LPF Final    Crystals, UA 02/02/2022 NOT REPORTED  None /HPF Final    Epithelial Cells UA 02/02/2022 0 TO 2  /HPF Final    Renal Epithelial, UA 02/02/2022 NOT REPORTED  0 /HPF Final    Bacteria, UA 02/02/2022 FEW* None Final    Mucus, UA 02/02/2022 NOT REPORTED  None Final    Trichomonas, UA 02/02/2022 NOT REPORTED  None Final    Amorphous, UA 02/02/2022 NOT REPORTED  None Final    Other Observations UA 02/02/2022 NOT REPORTED  NOT REQ. Final    Yeast, UA 02/02/2022 NOT REPORTED  None Final         Reviewed patient's current plan of care and vital signs with nursing staff. Labs reviewed: [x] Yes  Last EKG in EMR reviewed: [x] Yes    Medications  Current Facility-Administered Medications: traZODone (DESYREL) tablet 50 mg, 50 mg, Oral, Nightly PRN  amLODIPine (NORVASC) tablet 5 mg, 5 mg, Oral, Daily  atorvastatin (LIPITOR) tablet 40 mg, 40 mg, Oral, Nightly  cetirizine (ZYRTEC) tablet 10 mg, 10 mg, Oral, Daily  fluticasone (FLONASE) 50 MCG/ACT nasal spray 2 spray, 2 spray, Each Nostril, Daily  lisinopril (PRINIVIL;ZESTRIL) tablet 40 mg, 40 mg, Oral, Daily  melatonin tablet 1.5 mg, 1.5 mg, Oral, Nightly PRN  pantoprazole (PROTONIX) tablet 40 mg, 40 mg, Oral, QAM AC  albuterol sulfate  (90 Base) MCG/ACT inhaler 2 puff, 2 puff, Inhalation, Q6H PRN  QUEtiapine (SEROQUEL) tablet 25 mg, 25 mg, Oral, BID  buprenorphine-naloxone (SUBOXONE) 8-2 MG SL film 1.5 Film, 1.5 Film, SubLINGual, Daily  VORTIoxetine (TRINTELLIX) tablet 10 mg, 10 mg, Oral, Daily  LORazepam (ATIVAN) tablet 1 mg, 1 mg, Oral, Once  acetaminophen (TYLENOL) tablet 650 mg, 650 mg, Oral, Q4H PRN  aluminum & magnesium hydroxide-simethicone (MAALOX) 200-200-20 MG/5ML suspension 30 mL, 30 mL, Oral, Q6H PRN  hydrOXYzine (ATARAX) tablet 50 mg, 50 mg, Oral, TID PRN  ibuprofen (ADVIL;MOTRIN) tablet 400 mg, 400 mg, Oral, Q6H PRN  polyethylene glycol (GLYCOLAX) packet 17 g, 17 g, Oral, Daily PRN  nicotine polacrilex (NICORETTE) gum 2 mg, 2 mg, Oral, Q1H PRN    ASSESSMENT  Bipolar I disorder, current or most recent episode depressed, with psychotic features (HCC)  ELIZA with panic attacks  Cannabis use  Opioid abuse, episodic         HANDOFF  Patient symptoms are: modestly improving   Medications as determined by attending physician  Encourage participation in groups and milieu.   Probable discharge is to be determined by MD    Electronically signed by JORGE Lopez CNP on 2/4/2022 at 3:03 PM    **This report has been created using voice recognition software. It may contain minor errors which are inherent in voice recognition technology. **                                         Psychiatry Attending Attestation     I independently saw and evaluated the patient. I reviewed the Advance Practice Provider's documentation above. Any additional comments or changes to the Advance Practice Provider's documentation are stated below otherwise agree with assessment. Patient reports that he has been dealing with severe racing thoughts and extreme paranoia today. Mentions that he constantly feels that somebody is out there trying to get to him. Reports dealing with very high anxiety. Notes that Seroquel did help him some however was requesting to go higher on the dose. Agree with the plan. Mentions that suicidal thoughts continue to be very strong today. Able to contract for safety here on the unit however is unable to contract for safety outside of the hospital.  Discussed with him about continue to titrate Seroquel to 50 mg 3 times daily to help with his mood. He is agreeable to the plan. Will consider increasing Trintellix over the weekend. PLAN  Patient s symptoms   show no change  Will continue to titrate Seroquel  Attempt to develop insight  Psycho-education conducted. Supportive Therapy conducted.   Probable discharge is next week  Follow-up TBD      Electronically signed by Oleg Richard MD on 2/4/22 at 7:57 PM EST

## 2022-02-04 NOTE — PLAN OF CARE
[facility]  Day 3 Interdisciplinary Treatment Plan NOTE    Review Date & Time: 2/4/2022   0926    Admission Type:   Admission Type: Voluntary    Reason for admission:  Reason for Admission: Presented to the ED with increased anxiety, SI with no specific plan, and paranoia that he is being watched and followed. Patient reports he has been off his medications/[facility] for over a month since his last discharge in December 2021. Patient recently lost his job, [employer] due to having panic attacks.   Estimated Length of Stay: 5-7 days  Estimated Discharge Date Update: to be determined by physician    PATIENT STRENGTHS:  Patient Strengths Strengths: Social Skills,Communication  Patient Strengths and Limitations:Limitations: Hopeless about future,Difficult relationships / poor social skills,Difficulty problem solving/relies on others to help solve problems  Addictive Behavior:Addictive Behavior  In the past 3 months, have you felt or has someone told you that you have a problem with:  : None  Do you have a history of Chemical Use?: No  Do you have a history of Alcohol Use?: No  Do you have a history of Street Drug Abuse?: Yes  Histroy of Prescripton Drug Abuse?: Yes  Medical Problems:  Past Medical History:   Diagnosis Date    Bipolar disorder ([facility] [address]     Hypertension     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       Risk:  Fall RiskTotal: 73  Nick Scale Nick Scale Score: 22  BVC    Change in scores no Changes to plan of Care no    Status EXAM:   Status and Exam  Normal: No  Facial Expression: Sad,Flat  Affect: Appropriate  Level of Consciousness: Alert  Mood:Normal: No  Mood: Depressed  Motor Activity:Normal: No  Motor Activity: Decreased  Interview Behavior: Cooperative  Preception: [place] to Person,[place] to Time,Cary to Place,[place] to Situation  Attention:Normal: Yes  Attention: Distractible  Thought Processes: Circumstantial  Thought Content:Normal: No  Thought Content: Preoccupations  Hallucinations: None  Delusions: No  Memory:Normal: Yes  Insight and Judgment: No  Insight and Judgment: Poor Judgment,Poor Insight  Present Suicidal Ideation: No  Present Homicidal Ideation: No    Daily Assessment Last Entry:   Daily Sleep (WDL): Exceptions to WDL         Patient Currently in Pain: No  Daily Nutrition (WDL): Within Defined Limits    Patient Monitoring:  Frequency of Checks: 4 times per hour, close    Psychiatric Symptoms:   Depression Symptoms  Depression Symptoms: Feelings of helplessness,Sleep disturbance  Anxiety Symptoms  Anxiety Symptoms: Generalized  Keily Symptoms  Keily Symptoms: No problems reported or observed. Psychosis Symptoms  Delusion Type: No problems reported or observed. Suicide Risk CSSR-S:  1) Within the past month, have you wished you were dead or wished you could go to sleep and not wake up? : Yes  2) Have you actually had any thoughts of killing yourself? : Yes  3) Have you been thinking about how you might kill yourself? : Yes  5) Have you started to work out or worked out the details of how to kill yourself? Do you intend to carry out this plan? : Yes  6) Have you ever done anything, started to do anything, or prepared to do anything to end your life?: Yes  Change in Result NO Change in Plan of care NO      EDUCATION:   EDUCATION:   Learner Progress Toward Treatment Goals: Reviewed results and recommendations of this team, Reviewed group plan and strategies, Reviewed signs, symptoms and risk of self harm and violent behavior, Reviewed goals and plan of care    Method:small group, individual verbal education    Outcome:verbalized by patient, but needs reinforcement to obtain goals    PATIENT GOALS:  Short term: Improved sleep; Decrease symptoms of depression; Medication stabilization; Decrease anxiety;    Long term: Wanting to apply for social security to help with needs and stable housing; [place] finances; Continue decreasing anxiety and coping with anxiety;     PLAN/TREATMENT RECOMMENDATIONS UPDATE: continue with group therapies, increased socialization, continue planning for after discharge goals, continue with medication compliance    SHORT-TERM GOALS UPDATE:   Time frame for Short-Term Goals: 5-7 days    LONG-TERM GOALS UPDATE:   Time frame for Long-Term Goals: 6 months  Members Present in Team Meeting: See Signature Sheet    Jason Morales

## 2022-02-05 LAB — TSH SERPL DL<=0.05 MIU/L-ACNC: 1.41 MIU/L (ref 0.3–5)

## 2022-02-05 PROCEDURE — 36415 COLL VENOUS BLD VENIPUNCTURE: CPT

## 2022-02-05 PROCEDURE — 99232 SBSQ HOSP IP/OBS MODERATE 35: CPT | Performed by: INTERNAL MEDICINE

## 2022-02-05 PROCEDURE — 1240000000 HC EMOTIONAL WELLNESS R&B

## 2022-02-05 PROCEDURE — 6370000000 HC RX 637 (ALT 250 FOR IP): Performed by: PSYCHIATRY & NEUROLOGY

## 2022-02-05 PROCEDURE — 6370000000 HC RX 637 (ALT 250 FOR IP): Performed by: NURSE PRACTITIONER

## 2022-02-05 PROCEDURE — 99232 SBSQ HOSP IP/OBS MODERATE 35: CPT | Performed by: PSYCHIATRY & NEUROLOGY

## 2022-02-05 PROCEDURE — 84443 ASSAY THYROID STIM HORMONE: CPT

## 2022-02-05 PROCEDURE — APPSS30 APP SPLIT SHARED TIME 16-30 MINUTES: Performed by: NURSE PRACTITIONER

## 2022-02-05 RX ORDER — QUETIAPINE FUMARATE 25 MG/1
25 TABLET, FILM COATED ORAL ONCE
Status: COMPLETED | OUTPATIENT
Start: 2022-02-05 | End: 2022-02-05

## 2022-02-05 RX ORDER — QUETIAPINE FUMARATE 100 MG/1
100 TABLET, FILM COATED ORAL NIGHTLY
Status: DISCONTINUED | OUTPATIENT
Start: 2022-02-06 | End: 2022-02-06

## 2022-02-05 RX ADMIN — CETIRIZINE HYDROCHLORIDE TABLETS 10 MG: 10 TABLET, FILM COATED ORAL at 07:55

## 2022-02-05 RX ADMIN — QUETIAPINE FUMARATE 50 MG: 50 TABLET ORAL at 07:56

## 2022-02-05 RX ADMIN — PANTOPRAZOLE SODIUM 40 MG: 40 TABLET, DELAYED RELEASE ORAL at 07:56

## 2022-02-05 RX ADMIN — AMLODIPINE BESYLATE 5 MG: 5 TABLET ORAL at 07:55

## 2022-02-05 RX ADMIN — NICOTINE POLACRILEX 2 MG: 2 GUM, CHEWING BUCCAL at 15:23

## 2022-02-05 RX ADMIN — Medication 1.5 MG: at 20:48

## 2022-02-05 RX ADMIN — LISINOPRIL 40 MG: 20 TABLET ORAL at 07:55

## 2022-02-05 RX ADMIN — QUETIAPINE FUMARATE 25 MG: 25 TABLET ORAL at 16:51

## 2022-02-05 RX ADMIN — NICOTINE POLACRILEX 2 MG: 2 GUM, CHEWING BUCCAL at 07:56

## 2022-02-05 RX ADMIN — TRAZODONE HYDROCHLORIDE 50 MG: 50 TABLET ORAL at 20:48

## 2022-02-05 RX ADMIN — ATORVASTATIN CALCIUM 40 MG: 20 TABLET, FILM COATED ORAL at 20:48

## 2022-02-05 RX ADMIN — QUETIAPINE FUMARATE 50 MG: 50 TABLET ORAL at 13:02

## 2022-02-05 RX ADMIN — HYDROXYZINE HYDROCHLORIDE 50 MG: 50 TABLET, FILM COATED ORAL at 20:48

## 2022-02-05 RX ADMIN — FLUTICASONE PROPIONATE 2 SPRAY: 50 SPRAY, METERED NASAL at 07:55

## 2022-02-05 RX ADMIN — VORTIOXETINE 10 MG: 10 TABLET, FILM COATED ORAL at 07:55

## 2022-02-05 RX ADMIN — NICOTINE POLACRILEX 2 MG: 2 GUM, CHEWING BUCCAL at 11:12

## 2022-02-05 RX ADMIN — BUPRENORPHINE AND NALOXONE 1.5 FILM: 8; 2 FILM BUCCAL; SUBLINGUAL at 07:57

## 2022-02-05 NOTE — GROUP NOTE
Group Therapy Note    Date: 2/5/2022    Group Start Time: 1315  Group End Time: 1055  Group Topic: Cognitive Skills    STJOSE Gaspar, CTRS    Pt did not attend cognitive skills group at 1315 d/t resting in room despite staff invitation to attend. 1:1 talk time offered as alternative to group session, pt declined.         Signature:  Myron Gaspar, [address]

## 2022-02-05 NOTE — PROGRESS NOTES
Daily Progress Note  2/5/2022    Patient Name: Sara Be    CHIEF COMPLAINT: Depression with suicidal ideation with plan and intent to overdose on medications         Emergency Medications: None     Scheduled psychiatric medications: Adherent with Suboxone, Seroquel, Trintellix    SUBJECTIVE:   Patient was seen today for follow-up assessment in his assigned room. Nursing staff report he has been cooperative and interactions with peers and staff remain appropriate. He was resting in bed on approach again with his eyes covered by a towel. He did engage in brief assessment but was not interested in sustained conversation. He was somewhat irritable but remained calm. Patient endorses small improvements in mood and thoughts and states that he believes the small increase in Seroquel is \"starting to work\". He continues to endorse paranoia but he reports that he feels less paranoid today. Suicidal ideation is also less frequent, and he describes this as intermittent. He denies any ongoing racing thoughts today. He is able to contract for safety on the unit but at this time he is unable to contract for safety in the community.     Appetite:  [x] Normal/Adequate/Unchanged  [] Increased  [] Decreased      Sleep:       [] Normal/Adequate/Unchanged  [x] Fair  [] Poor      Group Attendance on Unit:   [x] Yes  [] Selectively    [] No    Medication Side Effects: Denies         Mental Status Exam  Level of consciousness: Alert and awake   Appearance: Appropriate attire for setting, resting in bed, with fair grooming and hygiene   Behavior/Motor: Approachable, no psychomotor abnormalities  Attitude toward examiner: Somewhat cooperative, attentive, poor eye contact  Speech: normal rate and normal volume with depressed tone  Mood: Depressed, some irritability  Affect: Blunted  Thought processes: linear and coherent   Thought content: Denies homicidal ideation  Suicidal Ideation: Endorses ongoing; contracts for safety on unit  Delusions: Expresses paranoia; some improvement  Perceptual Disturbance: patient is not observed responding to internal stimuli; denies AVH  Cognition: Oriented to self, location, time, and situation  Memory: intact  Insight & Judgement: poor     Data   height is 5' 10\" (1.778 m) and weight is 177 lb (80.3 kg). His oral temperature is 97.7 °F (36.5 °C). His blood pressure is 109/66 and his pulse is 66. His respiration is 14 and oxygen saturation is 100%.    Labs:   Admission on 02/02/2022   Component Date Value Ref Range Status    WBC 02/02/2022 11.8* 3.5 - 11.0 k/uL Final    RBC 02/02/2022 4.39* 4.5 - 5.9 m/uL Final    Hemoglobin 02/02/2022 13.6  13.5 - 17.5 g/dL Final    Hematocrit 02/02/2022 40.8* 41 - 53 % Final    MCV 02/02/2022 92.9  80 - 100 fL Final    MCH 02/02/2022 31.1  26 - 34 pg Final    MCHC 02/02/2022 33.4  31 - 37 g/dL Final    RDW 02/02/2022 14.6  11.5 - 14.9 % Final    Platelets 67/75/6634 320  150 - 450 k/uL Final    MPV 02/02/2022 6.1  6.0 - 12.0 fL Final    NRBC Automated 02/02/2022 NOT REPORTED  per 100 WBC Final    Differential Type 02/02/2022 NOT REPORTED   Final    Seg Neutrophils 02/02/2022 80* 36 - 66 % Final    Lymphocytes 02/02/2022 13* 24 - 44 % Final    Monocytes 02/02/2022 5  1 - 7 % Final    Eosinophils % 02/02/2022 1  0 - 4 % Final    Basophils 02/02/2022 1  0 - 2 % Final    Immature Granulocytes 02/02/2022 NOT REPORTED  0 % Final    Segs Absolute 02/02/2022 9.50* 1.3 - 9.1 k/uL Final    Absolute Lymph # 02/02/2022 1.50  1.0 - 4.8 k/uL Final    Absolute Mono # 02/02/2022 0.60  0.1 - 1.3 k/uL Final    Absolute Eos # 02/02/2022 0.10  0.0 - 0.4 k/uL Final    Basophils Absolute 02/02/2022 0.10  0.0 - 0.2 k/uL Final    Absolute Immature Granulocyte 02/02/2022 NOT REPORTED  0.00 - 0.30 k/uL Final    WBC Morphology 02/02/2022 NOT REPORTED   Final    RBC Morphology 02/02/2022 NOT REPORTED   Final    Platelet Estimate 42/56/3799 NOT REPORTED   Final    Glucose 02/02/2022 141* 70 - 99 mg/dL Final    BUN 02/02/2022 13  6 - 20 mg/dL Final    CREATININE 02/02/2022 0.83  0.70 - 1.20 mg/dL Final    Bun/Cre Ratio 02/02/2022 NOT REPORTED  9 - 20 Final    Calcium 02/02/2022 9.3  8.6 - 10.4 mg/dL Final    Sodium 02/02/2022 134* 135 - 144 mmol/L Final    Potassium 02/02/2022 3.8  3.7 - 5.3 mmol/L Final    Chloride 02/02/2022 97* 98 - 107 mmol/L Final    CO2 02/02/2022 25  20 - 31 mmol/L Final    Anion Gap 02/02/2022 12  9 - 17 mmol/L Final    Alkaline Phosphatase 02/02/2022 75  40 - 129 U/L Final    ALT 02/02/2022 6  5 - 41 U/L Final    AST 02/02/2022 10  <40 U/L Final    Total Bilirubin 02/02/2022 0.20* 0.3 - 1.2 mg/dL Final    Total Protein 02/02/2022 6.6  6.4 - 8.3 g/dL Final    Albumin 02/02/2022 4.2  3.5 - 5.2 g/dL Final    Albumin/Globulin Ratio 02/02/2022 NOT REPORTED  1.0 - 2.5 Final    GFR Non- 02/02/2022 >60  >60 mL/min Final    GFR  02/02/2022 >60  >60 mL/min Final    GFR Comment 02/02/2022        Final    Comment: Average GFR for 52-63 years old:   80 mL/min/1.73sq m  Chronic Kidney Disease:   <60 mL/min/1.73sq m  Kidney failure:   <15 mL/min/1.73sq m              eGFR calculated using average adult body mass.  Additional eGFR calculator available at:        [employer]            GFR Staging 02/02/2022 NOT REPORTED   Final    Color, UA 02/02/2022 Yellow  Yellow Final    Turbidity UA 02/02/2022 Clear  Clear Final    Glucose, Ur 02/02/2022 NEGATIVE  NEGATIVE Final    Bilirubin Urine 02/02/2022 NEGATIVE  NEGATIVE Final    Ketones, Urine 02/02/2022 NEGATIVE  NEGATIVE Final    Specific Gravity, UA 02/02/2022 1.016  1.000 - 1.030 Final    Urine Hgb 02/02/2022 1+* NEGATIVE Final    pH, UA 02/02/2022 6.5  5.0 - 8.0 Final    Protein, UA 02/02/2022 NEGATIVE  NEGATIVE Final    Urobilinogen, Urine 02/02/2022 Normal  Normal Final    Nitrite, Urine 02/02/2022 NEGATIVE  NEGATIVE Final    Leukocyte Esterase, Urine 02/02/2022 NEGATIVE  NEGATIVE Final    Urinalysis Comments 02/02/2022 NOT REPORTED   Final    Ethanol 02/02/2022 <10  <10 mg/dL Final    Ethanol percent 02/02/2022 <0.010  % Final    Amphetamine Screen, Ur 02/02/2022 NEGATIVE  NEGATIVE Final    Comment:       (Positive cutoff 1000 ng/mL)                  Barbiturate Screen, Ur 02/02/2022 NEGATIVE  NEGATIVE Final    Comment:       (Positive cutoff 200 ng/mL)                  Benzodiazepine Screen, Urine 02/02/2022 NEGATIVE  NEGATIVE Final    Comment:       (Positive cutoff 200 ng/mL)                  Cocaine Metabolite, Urine 02/02/2022 NEGATIVE  NEGATIVE Final    Comment:       (Positive cutoff 300 ng/mL)                  Methadone Screen, Urine 02/02/2022 NEGATIVE  NEGATIVE Final    Comment:       (Positive cutoff 300 ng/mL)                  Opiates, Urine 02/02/2022 NEGATIVE  NEGATIVE Final    Comment:       (Positive cutoff 300 ng/mL)                  Phencyclidine, Urine 02/02/2022 NEGATIVE  NEGATIVE Final    Comment:       (Positive cutoff 25 ng/mL)                  Propoxyphene, Urine 02/02/2022 NOT REPORTED  NEGATIVE Final    Cannabinoid Scrn, Ur 02/02/2022 POSITIVE* NEGATIVE Final    Comment:       (Positive cutoff 50 ng/mL)                  Oxycodone Screen, Ur 02/02/2022 NEGATIVE  NEGATIVE Final    Comment:       (Positive cutoff 100 ng/mL)                  Methamphetamine, Urine 02/02/2022 NOT REPORTED  NEGATIVE Final    Tricyclic Antidepressants, Urine 02/02/2022 NOT REPORTED  NEGATIVE Final    MDMA, Urine 02/02/2022 NOT REPORTED  NEGATIVE Final    Buprenorphine Urine 02/02/2022 NOT REPORTED  NEGATIVE Final    Test Information 02/02/2022 Assay provides medical screening only. The absence of expected drug(s) and/or metabolite(s) may indicate diluted or adulterated urine, limitations of testing or timing of collection.    Final    Comment: Testing for legal purposes should be confirmed by another method. To request confirmation   of test result, please call the lab within 7 days of sample submission.  Specimen Description 02/02/2022 . NASOPHARYNGEAL SWAB   Final    SARS-CoV-2, Rapid 02/02/2022 Not Detected  Not Detected Final    Comment:       Rapid NAAT:  The specimen is NEGATIVE for SARS-CoV-2, the novel coronavirus associated with   COVID-19. The ID NOW COVID-19 assay is designed to detect the virus that causes COVID-19 in patients   with signs and symptoms of infection who are suspected of COVID-19. An individual without symptoms of COVID-19 and who is not shedding SARS-CoV-2 virus would   expect to have a negative (not detected) result in this assay. Negative results should be treated as presumptive and, if inconsistent with clinical signs   and symptoms or necessary for patient management,  should be tested with an alternative molecular assay. Negative results do not preclude   SARS-CoV-2 infection and   should not be used as the sole basis for patient management decisions. Fact sheet for Healthcare Providers: Danish  Fact sheet for Patients: [employer]          Methodology: Isothermal Nucleic Acid Amplification      - 02/02/2022        Final    WBC, UA 02/02/2022 0 TO 2  /HPF Final    RBC, UA 02/02/2022 5 TO 10  /HPF Final    Casts UA 02/02/2022 NOT REPORTED  /LPF Final    Crystals, UA 02/02/2022 NOT REPORTED  None /HPF Final    Epithelial Cells UA 02/02/2022 0 TO 2  /HPF Final    Renal Epithelial, UA 02/02/2022 NOT REPORTED  0 /HPF Final    Bacteria, UA 02/02/2022 FEW* None Final    Mucus, UA 02/02/2022 NOT REPORTED  None Final    Trichomonas, UA 02/02/2022 NOT REPORTED  None Final    Amorphous, UA 02/02/2022 NOT REPORTED  None Final    Other Observations UA 02/02/2022 NOT REPORTED  NOT REQ.  Final    Yeast, UA 02/02/2022 NOT REPORTED  None Final    TSH 02/05/2022 1.41  0.30 - 5.00 mIU/L Final Reviewed patient's current plan of care and vital signs with nursing staff. Labs reviewed: [x] Yes  Last EKG in EMR reviewed: [x] Yes    Medications  Current Facility-Administered Medications: QUEtiapine (SEROQUEL) tablet 50 mg, 50 mg, Oral, TID  traZODone (DESYREL) tablet 50 mg, 50 mg, Oral, Nightly PRN  amLODIPine (NORVASC) tablet 5 mg, 5 mg, Oral, Daily  atorvastatin (LIPITOR) tablet 40 mg, 40 mg, Oral, Nightly  cetirizine (ZYRTEC) tablet 10 mg, 10 mg, Oral, Daily  fluticasone (FLONASE) 50 MCG/ACT nasal spray 2 spray, 2 spray, Each Nostril, Daily  lisinopril (PRINIVIL;ZESTRIL) tablet 40 mg, 40 mg, Oral, Daily  melatonin tablet 1.5 mg, 1.5 mg, Oral, Nightly PRN  pantoprazole (PROTONIX) tablet 40 mg, 40 mg, Oral, QAM AC  albuterol sulfate  (90 Base) MCG/ACT inhaler 2 puff, 2 puff, Inhalation, Q6H PRN  buprenorphine-naloxone (SUBOXONE) 8-2 MG SL film 1.5 Film, 1.5 Film, SubLINGual, Daily  VORTIoxetine (TRINTELLIX) tablet 10 mg, 10 mg, Oral, Daily  LORazepam (ATIVAN) tablet 1 mg, 1 mg, Oral, Once  acetaminophen (TYLENOL) tablet 650 mg, 650 mg, Oral, Q4H PRN  aluminum & magnesium hydroxide-simethicone (MAALOX) 200-200-20 MG/5ML suspension 30 mL, 30 mL, Oral, Q6H PRN  hydrOXYzine (ATARAX) tablet 50 mg, 50 mg, Oral, TID PRN  ibuprofen (ADVIL;MOTRIN) tablet 400 mg, 400 mg, Oral, Q6H PRN  polyethylene glycol (GLYCOLAX) packet 17 g, 17 g, Oral, Daily PRN  nicotine polacrilex (NICORETTE) gum 2 mg, 2 mg, Oral, Q1H PRN    ASSESSMENT  Bipolar I disorder, current or most recent episode depressed, with psychotic features (HCC)  ELIZA with panic attacks  Cannabis use  Opioid abuse, episodic         HANDOFF  Patient symptoms are: modestly improving   Medications as determined by attending physician  Encourage participation in groups and milieu.   Probable discharge is to be determined by MD    Electronically signed by JROGE Campos CNP on 2/5/2022 at 2:45 PM    **This report has been created using voice recognition software. It may contain minor errors which are inherent in voice recognition technology. **                                                 Psychiatry Attending Attestation     I independently saw and evaluated the patient. I reviewed the Advance Practice Provider's documentation above. Any additional comments or changes to the Advance Practice Provider's documentation are stated below otherwise agree with assessment. Patient reports that he was very impulsive and severely paranoid yesterday. He did receive one-time dose of Seroquel which helped him calm down. Was requesting to go on the higher dose of Seroquel as he continues to feel extremely paranoid that people are out there trying to hurt him. Reports that his suicidal thoughts are also unchanged. Reports that he feels very emotionally charged today. Reports that he continues to deal with mood swings. Discussed with him about switching Seroquel to 75 mg twice daily-in morning and afternoon and increasing a to 100mg at nighttime. He is agreeable to the plan. PLAN  Patient s symptoms   show no change  Will continue to titrate his Seroquel  Attempt to develop insight  Psycho-education conducted. Supportive Therapy conducted.   Probable discharge is next week  Follow-up TBD    Electronically signed by Harlow Dubin, MD on 2/5/22 at 4:26 PM EST

## 2022-02-05 NOTE — PLAN OF CARE
Problem: Altered Mood, Depressive Behavior:  Goal: Ability to disclose and discuss suicidal ideas will improve  Description: Ability to disclose and discuss suicidal ideas will improve  2/5/2022 0922 by Makenzie Hodges LPN  Outcome: Ongoing   Patient denies any suicidal ideations. Problem: Altered Mood, Depressive Behavior:  Goal: Able to verbalize and/or display a decrease in depressive symptoms  Description: Able to verbalize and/or display a decrease in depressive symptoms  2/5/2022 0922 by Makenzie Hodges LPN  Outcome: Ongoing  Patient is selectively social with peers. Patient comes out to eat for meals and has good hygiene. Every 15 min checks maintained for pt safety.

## 2022-02-05 NOTE — PROGRESS NOTES
[facility]    Progress note               Date:   2/5/2022  Patient name:  Magnolia Plunkett  Date of admission:  2/2/2022  1:44 PM  MRN:   [id number]  Account:  [de-identified]  YOB: 1965  PCP:    No primary care provider on file. Room:   [address] [place] [address]  Code Status:    Full Code    Physician Requesting Consult: Keo De Leon, *    Reason for Consult:  medical management    Chief Complaint:     Chief Complaint   Patient presents with    Suicidal   htn  hld      History Obtained From:     Patient medical record nursing staff    History of Present Illness:     HTN  Onset more than 2 years ago  john mild to mod  Controlled with current po meds  Not associated with headaches or blurry vision  No chest pain    HLD  Onset more than 5 years ago  Severity is mild, not getting worse  Not associated with pancreatitis  Tolerating statin well no muscle pain        Past Medical History:     Past Medical History:   Diagnosis Date    Bipolar disorder ([facility] [address]     Hypertension     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 No pertinent surgical history. Medications Prior to Admission:     Prior to Admission medications    Medication Sig Start Date End Date Taking?  Authorizing Provider   atorvastatin (LIPITOR) 40 MG tablet Take 1 tablet by mouth nightly 12/9/21   Rich Thayer MD   cetirizine (ZYRTEC) 10 MG tablet Take 1 tablet by mouth daily 12/9/21   Rich Thayer MD   fluticasone (FLONASE) 50 MCG/ACT nasal spray instill 1 spray into each nostril once daily 12/9/21   Rich Thayer MD   ibuprofen (ADVIL;MOTRIN) 400 MG tablet Take 1 tablet by mouth every 6 hours as needed for Pain 12/9/21   Rich Thayer MD   lisinopril (PRINIVIL;ZESTRIL) 40 MG tablet Take 1 tablet by mouth daily 12/10/21   Rich Thayer MD   melatonin 3 MG TABS tablet Take 0.5 tablets by mouth nightly as needed (insomnia) 12/9/21   Annamarie Donaldson MD   omeprazole (PRILOSEC) 20 MG delayed release capsule Take 1 capsule by mouth Daily 12/9/21   Annamarie Donaldson MD   risperiDONE (RISPERDAL) 0.5 MG tablet Take 1 tablet by mouth 2 times daily 12/9/21   Annamarie Donaldson MD   VENTOLIN  (90 Base) MCG/ACT inhaler Inhale 2 puffs into the lungs every 6 hours as needed for Wheezing 12/9/21   Annamarie Donaldson MD   amLODIPine (NORVASC) 5 MG tablet take 1 tablet by mouth once daily 4/4/21   Frandy Michelle MD        Allergies:     Skelaxin [metaxalone]    Social History:     Tobacco:    reports that he has been smoking pipe and cigarettes. He started smoking about 19 years ago. He has a 33.00 pack-year smoking history. He has never used smokeless tobacco.  Alcohol:      reports previous alcohol use. Drug Use:  reports previous drug use. Drug: Cocaine. Family History:     Family History   Problem Relation Age of Onset    Bipolar Disorder Mother     Alcohol Abuse Mother         Liver cirrhosis    Coronary Art Dis Brother     Hypertension Other         Sibling    Seizures Other         Siblings    Hypertension Father     Stroke Father        Review of Systems:     Positive and Negative as described in HPI. CONSTITUTIONAL:  negative for fevers, chills, sweats, fatigue, weight loss  HEENT:  negative for vision, hearing changes, runny nose, throat pain  RESPIRATORY:  negative for shortness of breath, cough, congestion, wheezing. CARDIOVASCULAR:  negative for chest pain, palpitations.   GASTROINTESTINAL:  negative for nausea, vomiting, diarrhea, constipation, change in bowel habits, abdominal pain   GENITOURINARY:  negative for difficulty of urination, burning with urination, frequency   INTEGUMENT:  negative for rash, skin lesions, easy bruising   HEMATOLOGIC/LYMPHATIC:  negative for swelling/edema   ALLERGIC/IMMUNOLOGIC:  negative for urticaria , itching  ENDOCRINE:  negative increase in drinking, increase in urination, hot or cold intolerance  MUSCULOSKELETAL:  negative joint pains, muscle aches, swelling of joints  NEUROLOGICAL:  negative for headaches, dizziness, lightheadedness, numbness, pain, tingling extremities      Physical Exam:     /66   Pulse 66   Temp 97.7 °F (36.5 °C) (Oral)   Resp 14   Ht 5' 10\" (1.778 m)   Wt 177 lb (80.3 kg)   SpO2 100%   BMI 25.40 kg/m²   Temp (24hrs), Av.8 °F (36.6 °C), Min:97.7 °F (36.5 °C), Max:97.9 °F (36.6 °C)    No results for input(s): POCGLU in the last 72 hours. No intake or output data in the 24 hours ending 22 1724  Left side of face small 1cm skin lesion   ruleout basal cell ca    General Appearance:  alert, well appearing, and in no acute distress  Mental status: oriented to person, place, and time with normal affect  Head:  normocephalic, atraumatic. Eye: no icterus, redness, pupils equal and reactive, extraocular eye movements intact, conjunctiva clear  Ear: normal external ear, no discharge, hearing intact  Nose:  no drainage noted  Mouth: mucous membranes moist  Neck: supple, no carotid bruits, thyroid not palpable  Lungs: Bilateral equal air entry, clear to ausculation, no wheezing, rales or rhonchi, normal effort  Cardiovascular: normal rate, regular rhythm, no murmur, gallop, rub.   Abdomen: Soft, nontender, nondistended, normal bowel sounds, no hepatomegaly or splenomegaly  Neurologic: There are no new focal motor or sensory deficits, normal muscle tone and bulk, no abnormal sensation, normal speech, cranial nerves II through XII grossly intact  Skin: No gross lesions, rashes, bruising or bleeding on exposed skin area  Extremities:  peripheral pulses palpable, no pedal edema or calf pain with palpation      Investigations:      Laboratory Testing:  Recent Results (from the past 24 hour(s))   TSH with Reflex    Collection Time: 22  7:26 AM   Result Value Ref Range    TSH 1.41 0.30 - 5.00 mIU/L           Consultations:   IP CONSULT TO INTERNAL MEDICINE  Assessment :      Primary Problem  Bipolar I disorder, current or most recent episode depressed, with psychotic features [facility])    Active Hospital Problems    Diagnosis Date Noted    Bipolar I disorder, current or most recent episode depressed, with psychotic features ([facility] [address] [F31.5] 02/03/2022    Cannabis abuse [F12.10] 02/03/2022    Opioid abuse, episodic ([facility] [address] [F11.10] 02/03/2022    Generalized anxiety disorder with panic attacks [F41.1, F41.0] 12/06/2021    Chronic back pain [M54.9, G89.29] 07/18/2014    Chronic diarrhea [K52.9] 07/18/2014    GERD (gastroesophageal reflux disease) [K21.9] 07/18/2014    HTN (hypertension) [I10] 07/18/2014       Plan:     Hypertension start Norvasc 5 mg and lisinopril 40 mg  Hyperlipidemia start Lipitor 40 mg  History of gastroesophageal reflux restart pantoprazole 40 mg  Hyponatremia sodium 134 check TSH and cortisol levels  History of prediabetes A1c 6.0 diet and exercise advised outpatient follow-up with PCP  Left side of nose possible skin cancer needs bx  Advised to see dermatology out pt    2/5  Hypertension, continue to monitor blood pressure   Hyperlipidemia,  On Lipitor,  Prediabetes, diet controlled  Toenail fungus,   Left side of nose possible skin cancer needs bx  Advised to see dermatology out pt      Jay Craven MD  2/5/2022  5:24 PM    Copy sent to Dr. Nilson Tenorio primary care provider on file. Please note that this chart was generated using voice recognition Dragon dictation software. Although every effort was made to ensure the accuracy of this automated transcription, some errors in transcription may have occurred.

## 2022-02-06 PROCEDURE — 99232 SBSQ HOSP IP/OBS MODERATE 35: CPT | Performed by: PSYCHIATRY & NEUROLOGY

## 2022-02-06 PROCEDURE — 6370000000 HC RX 637 (ALT 250 FOR IP): Performed by: PSYCHIATRY & NEUROLOGY

## 2022-02-06 PROCEDURE — 6370000000 HC RX 637 (ALT 250 FOR IP): Performed by: NURSE PRACTITIONER

## 2022-02-06 PROCEDURE — 1240000000 HC EMOTIONAL WELLNESS R&B

## 2022-02-06 RX ORDER — QUETIAPINE FUMARATE 100 MG/1
100 TABLET, FILM COATED ORAL ONCE
Status: DISCONTINUED | OUTPATIENT
Start: 2022-02-06 | End: 2022-02-08 | Stop reason: HOSPADM

## 2022-02-06 RX ORDER — QUETIAPINE FUMARATE 200 MG/1
200 TABLET, FILM COATED ORAL NIGHTLY
Status: DISCONTINUED | OUTPATIENT
Start: 2022-02-07 | End: 2022-02-08 | Stop reason: HOSPADM

## 2022-02-06 RX ADMIN — ATORVASTATIN CALCIUM 40 MG: 20 TABLET, FILM COATED ORAL at 21:26

## 2022-02-06 RX ADMIN — HYDROXYZINE HYDROCHLORIDE 50 MG: 50 TABLET, FILM COATED ORAL at 21:26

## 2022-02-06 RX ADMIN — NICOTINE POLACRILEX 2 MG: 2 GUM, CHEWING BUCCAL at 12:51

## 2022-02-06 RX ADMIN — NICOTINE POLACRILEX 2 MG: 2 GUM, CHEWING BUCCAL at 07:38

## 2022-02-06 RX ADMIN — FLUTICASONE PROPIONATE 2 SPRAY: 50 SPRAY, METERED NASAL at 07:44

## 2022-02-06 RX ADMIN — QUETIAPINE FUMARATE 75 MG: 50 TABLET ORAL at 07:38

## 2022-02-06 RX ADMIN — LISINOPRIL 40 MG: 20 TABLET ORAL at 07:39

## 2022-02-06 RX ADMIN — PANTOPRAZOLE SODIUM 40 MG: 40 TABLET, DELAYED RELEASE ORAL at 07:39

## 2022-02-06 RX ADMIN — BUPRENORPHINE AND NALOXONE 1.5 FILM: 8; 2 FILM BUCCAL; SUBLINGUAL at 08:26

## 2022-02-06 RX ADMIN — TRAZODONE HYDROCHLORIDE 50 MG: 50 TABLET ORAL at 21:25

## 2022-02-06 RX ADMIN — Medication 1.5 MG: at 21:26

## 2022-02-06 RX ADMIN — AMLODIPINE BESYLATE 5 MG: 5 TABLET ORAL at 07:39

## 2022-02-06 RX ADMIN — QUETIAPINE FUMARATE 75 MG: 50 TABLET ORAL at 12:51

## 2022-02-06 RX ADMIN — QUETIAPINE FUMARATE 100 MG: 100 TABLET ORAL at 21:25

## 2022-02-06 RX ADMIN — CETIRIZINE HYDROCHLORIDE TABLETS 10 MG: 10 TABLET, FILM COATED ORAL at 07:39

## 2022-02-06 RX ADMIN — VORTIOXETINE 10 MG: 10 TABLET, FILM COATED ORAL at 07:38

## 2022-02-06 ASSESSMENT — PAIN DESCRIPTION - PAIN TYPE: TYPE: CHRONIC PAIN

## 2022-02-06 ASSESSMENT — PAIN SCALES - GENERAL: PAINLEVEL_OUTOF10: 3

## 2022-02-06 ASSESSMENT — PAIN DESCRIPTION - LOCATION: LOCATION: GENERALIZED

## 2022-02-06 NOTE — PLAN OF CARE
Problem: Altered Mood, Depressive Behavior:  Goal: Ability to disclose and discuss suicidal ideas will improve  Description: Ability to disclose and discuss suicidal ideas will improve  Outcome: Ongoing     Problem: Altered Mood, Depressive Behavior:  Goal: Able to verbalize and/or display a decrease in depressive symptoms  Description: Able to verbalize and/or display a decrease in depressive symptoms  Outcome: Ongoing   Patient denies any suicidal ideations. Patient is cooperative and medication compliant. Every 15 min checks maintained for pt safety.

## 2022-02-07 PROCEDURE — APPSS30 APP SPLIT SHARED TIME 16-30 MINUTES

## 2022-02-07 PROCEDURE — 1240000000 HC EMOTIONAL WELLNESS R&B

## 2022-02-07 PROCEDURE — 6370000000 HC RX 637 (ALT 250 FOR IP): Performed by: PSYCHIATRY & NEUROLOGY

## 2022-02-07 PROCEDURE — 99232 SBSQ HOSP IP/OBS MODERATE 35: CPT | Performed by: PSYCHIATRY & NEUROLOGY

## 2022-02-07 PROCEDURE — 6370000000 HC RX 637 (ALT 250 FOR IP): Performed by: NURSE PRACTITIONER

## 2022-02-07 RX ADMIN — ATORVASTATIN CALCIUM 40 MG: 20 TABLET, FILM COATED ORAL at 21:26

## 2022-02-07 RX ADMIN — NICOTINE POLACRILEX 2 MG: 2 GUM, CHEWING BUCCAL at 11:04

## 2022-02-07 RX ADMIN — QUETIAPINE FUMARATE 75 MG: 50 TABLET ORAL at 13:20

## 2022-02-07 RX ADMIN — NICOTINE POLACRILEX 2 MG: 2 GUM, CHEWING BUCCAL at 13:21

## 2022-02-07 RX ADMIN — HYDROXYZINE HYDROCHLORIDE 50 MG: 50 TABLET, FILM COATED ORAL at 21:26

## 2022-02-07 RX ADMIN — NICOTINE POLACRILEX 2 MG: 2 GUM, CHEWING BUCCAL at 17:21

## 2022-02-07 RX ADMIN — QUETIAPINE FUMARATE 75 MG: 50 TABLET ORAL at 07:40

## 2022-02-07 RX ADMIN — BUPRENORPHINE AND NALOXONE 1.5 FILM: 8; 2 FILM BUCCAL; SUBLINGUAL at 08:33

## 2022-02-07 RX ADMIN — QUETIAPINE FUMARATE 200 MG: 200 TABLET ORAL at 21:26

## 2022-02-07 RX ADMIN — VORTIOXETINE 10 MG: 10 TABLET, FILM COATED ORAL at 07:40

## 2022-02-07 RX ADMIN — Medication 1.5 MG: at 21:26

## 2022-02-07 RX ADMIN — TRAZODONE HYDROCHLORIDE 50 MG: 50 TABLET ORAL at 21:26

## 2022-02-07 RX ADMIN — FLUTICASONE PROPIONATE 2 SPRAY: 50 SPRAY, METERED NASAL at 07:39

## 2022-02-07 RX ADMIN — CETIRIZINE HYDROCHLORIDE TABLETS 10 MG: 10 TABLET, FILM COATED ORAL at 07:40

## 2022-02-07 RX ADMIN — PANTOPRAZOLE SODIUM 40 MG: 40 TABLET, DELAYED RELEASE ORAL at 07:40

## 2022-02-07 RX ADMIN — AMLODIPINE BESYLATE 5 MG: 5 TABLET ORAL at 07:40

## 2022-02-07 RX ADMIN — LISINOPRIL 40 MG: 20 TABLET ORAL at 07:40

## 2022-02-07 RX ADMIN — NICOTINE POLACRILEX 2 MG: 2 GUM, CHEWING BUCCAL at 08:33

## 2022-02-07 NOTE — GROUP NOTE
Group Therapy Note    Date: 2/7/2022    Group Start Time: 1000  Group End Time: 1030  Group Topic: Psychotherapy    CHARLES SIFUENTES    JAIMEE Martinez LSW        Group Therapy Note    Attendees: 5/14         Patient's Goal:  Increase interpersonal relationship skills    Notes:  Patient was an active participant in group discussion. Stated he was able to sleep longer last night and feels more rested. Patient states this has helped his mental health a great deal.    Status After Intervention:  Improved    Participation Level:  Active Listener and Interactive    Participation Quality: Appropriate, Attentive, Sharing and Supportive      Speech:  normal      Thought Process/Content: Logical  Linear      Affective Functioning: Congruent      Mood: euthymic      Level of consciousness:  Alert, Oriented x4 and Attentive      Response to Learning: Able to verbalize current knowledge/experience, Able to verbalize/acknowledge new learning, Able to retain information and Capable of insight      Endings: None Reported    Modes of Intervention: Support, Socialization and Exploration      Discipline Responsible: /Counselor      Signature:  JAIMEE Martinez LSW

## 2022-02-07 NOTE — PROGRESS NOTES
Daily Progress Note  2/6/2022    Patient Name: Radha Modi    CHIEF COMPLAINT: Depression with suicidal ideation with plan and intent to overdose on medications         Emergency Medications: None     Scheduled psychiatric medications: Adherent with Suboxone, Seroquel, Trintellix    SUBJECTIVE:   Patient largely remains isolated to the room today. Reports that Seroquel has been helpful during the daytime however reports that he has been sleeping very poor and continues to feel like he is \"emotionally charged\". Mentions that he only slept till 2 AM this morning and has been up since then. Reports dealing with severe racing thoughts irritability and severe paranoia that people are out there trying to get to him. Notes that his suicidal thoughts are somewhat better however continues to have them strong at night when he is not sleeping. Discussed with him about continue to optimize the Seroquel at nighttime as he is tolerating well without any side effects and is agreeable to the plan.     Appetite:  [x] Normal/Adequate/Unchanged  [] Increased  [] Decreased      Sleep:       [] Normal/Adequate/Unchanged  [] Fair  [x] Poor      Group Attendance on Unit:   [] Yes  [x] Selectively    [] No    Medication Side Effects: Denies         Mental Status Exam  Level of consciousness: Alert and awake   Appearance: Appropriate attire for setting, resting in bed, with fair grooming and hygiene   Behavior/Motor: Approachable, no psychomotor abnormalities  Attitude toward examiner: Somewhat cooperative, attentive, poor eye contact  Speech: normal rate and normal volume with depressed tone  Mood: Depressed, some irritability  Affect: labile  Thought processes: linear and coherent   Thought content: Denies homicidal ideation  Suicidal Ideation: Endorses ongoing; contracts for safety on unit  Delusions: Expresses paranoia; some improvement  Perceptual Disturbance: patient is not observed responding to internal stimuli; denies Bun/Cre Ratio 02/02/2022 NOT REPORTED  9 - 20 Final    Calcium 02/02/2022 9.3  8.6 - 10.4 mg/dL Final    Sodium 02/02/2022 134* 135 - 144 mmol/L Final    Potassium 02/02/2022 3.8  3.7 - 5.3 mmol/L Final    Chloride 02/02/2022 97* 98 - 107 mmol/L Final    CO2 02/02/2022 25  20 - 31 mmol/L Final    Anion Gap 02/02/2022 12  9 - 17 mmol/L Final    Alkaline Phosphatase 02/02/2022 75  40 - 129 U/L Final    ALT 02/02/2022 6  5 - 41 U/L Final    AST 02/02/2022 10  <40 U/L Final    Total Bilirubin 02/02/2022 0.20* 0.3 - 1.2 mg/dL Final    Total Protein 02/02/2022 6.6  6.4 - 8.3 g/dL Final    Albumin 02/02/2022 4.2  3.5 - 5.2 g/dL Final    Albumin/Globulin Ratio 02/02/2022 NOT REPORTED  1.0 - 2.5 Final    GFR Non- 02/02/2022 >60  >60 mL/min Final    GFR  02/02/2022 >60  >60 mL/min Final    GFR Comment 02/02/2022        Final    Comment: Average GFR for 52-63 years old:   80 mL/min/1.73sq m  Chronic Kidney Disease:   <60 mL/min/1.73sq m  Kidney failure:   <15 mL/min/1.73sq m              eGFR calculated using average adult body mass.  Additional eGFR calculator available at:        [employer]            GFR Staging 02/02/2022 NOT REPORTED   Final    Color, UA 02/02/2022 Yellow  Yellow Final    Turbidity UA 02/02/2022 Clear  Clear Final    Glucose, Ur 02/02/2022 NEGATIVE  NEGATIVE Final    Bilirubin Urine 02/02/2022 NEGATIVE  NEGATIVE Final    Ketones, Urine 02/02/2022 NEGATIVE  NEGATIVE Final    Specific Gravity, UA 02/02/2022 1.016  1.000 - 1.030 Final    Urine Hgb 02/02/2022 1+* NEGATIVE Final    pH, UA 02/02/2022 6.5  5.0 - 8.0 Final    Protein, UA 02/02/2022 NEGATIVE  NEGATIVE Final    Urobilinogen, Urine 02/02/2022 Normal  Normal Final    Nitrite, Urine 02/02/2022 NEGATIVE  NEGATIVE Final    Leukocyte Esterase, Urine 02/02/2022 NEGATIVE  NEGATIVE Final    Urinalysis Comments 02/02/2022 NOT REPORTED   Final    Ethanol 02/02/2022 <10  <10 mg/dL Final    Ethanol percent 02/02/2022 <0.010  % Final    Amphetamine Screen, Ur 02/02/2022 NEGATIVE  NEGATIVE Final    Comment:       (Positive cutoff 1000 ng/mL)                  Barbiturate Screen, Ur 02/02/2022 NEGATIVE  NEGATIVE Final    Comment:       (Positive cutoff 200 ng/mL)                  Benzodiazepine Screen, Urine 02/02/2022 NEGATIVE  NEGATIVE Final    Comment:       (Positive cutoff 200 ng/mL)                  Cocaine Metabolite, Urine 02/02/2022 NEGATIVE  NEGATIVE Final    Comment:       (Positive cutoff 300 ng/mL)                  Methadone Screen, Urine 02/02/2022 NEGATIVE  NEGATIVE Final    Comment:       (Positive cutoff 300 ng/mL)                  Opiates, Urine 02/02/2022 NEGATIVE  NEGATIVE Final    Comment:       (Positive cutoff 300 ng/mL)                  Phencyclidine, Urine 02/02/2022 NEGATIVE  NEGATIVE Final    Comment:       (Positive cutoff 25 ng/mL)                  Propoxyphene, Urine 02/02/2022 NOT REPORTED  NEGATIVE Final    Cannabinoid Scrn, Ur 02/02/2022 POSITIVE* NEGATIVE Final    Comment:       (Positive cutoff 50 ng/mL)                  Oxycodone Screen, Ur 02/02/2022 NEGATIVE  NEGATIVE Final    Comment:       (Positive cutoff 100 ng/mL)                  Methamphetamine, Urine 02/02/2022 NOT REPORTED  NEGATIVE Final    Tricyclic Antidepressants, Urine 02/02/2022 NOT REPORTED  NEGATIVE Final    MDMA, Urine 02/02/2022 NOT REPORTED  NEGATIVE Final    Buprenorphine Urine 02/02/2022 NOT REPORTED  NEGATIVE Final    Test Information 02/02/2022 Assay provides medical screening only. The absence of expected drug(s) and/or metabolite(s) may indicate diluted or adulterated urine, limitations of testing or timing of collection. Final    Comment: Testing for legal purposes should be confirmed by another method. To request confirmation   of test result, please call the lab within 7 days of sample submission.       Specimen Description 02/02/2022 Marcmaira Mckeon NASOPHARYNGEAL SWAB   Final    SARS-CoV-2, Rapid 02/02/2022 Not Detected  Not Detected Final    Comment:       Rapid NAAT:  The specimen is NEGATIVE for SARS-CoV-2, the novel coronavirus associated with   COVID-19. The ID NOW COVID-19 assay is designed to detect the virus that causes COVID-19 in patients   with signs and symptoms of infection who are suspected of COVID-19. An individual without symptoms of COVID-19 and who is not shedding SARS-CoV-2 virus would   expect to have a negative (not detected) result in this assay. Negative results should be treated as presumptive and, if inconsistent with clinical signs   and symptoms or necessary for patient management,  should be tested with an alternative molecular assay. Negative results do not preclude   SARS-CoV-2 infection and   should not be used as the sole basis for patient management decisions. Fact sheet for Healthcare Providers: [employer]  Fact sheet for Patients: [employer]          Methodology: Isothermal Nucleic Acid Amplification      - 02/02/2022        Final    WBC, UA 02/02/2022 0 TO 2  /HPF Final    RBC, UA 02/02/2022 5 TO 10  /HPF Final    Casts UA 02/02/2022 NOT REPORTED  /LPF Final    Crystals, UA 02/02/2022 NOT REPORTED  None /HPF Final    Epithelial Cells UA 02/02/2022 0 TO 2  /HPF Final    Renal Epithelial, UA 02/02/2022 NOT REPORTED  0 /HPF Final    Bacteria, UA 02/02/2022 FEW* None Final    Mucus, UA 02/02/2022 NOT REPORTED  None Final    Trichomonas, UA 02/02/2022 NOT REPORTED  None Final    Amorphous, UA 02/02/2022 NOT REPORTED  None Final    Other Observations UA 02/02/2022 NOT REPORTED  NOT REQ. Final    Yeast, UA 02/02/2022 NOT REPORTED  None Final    TSH 02/05/2022 1.41  0.30 - 5.00 mIU/L Final         Reviewed patient's current plan of care and vital signs with nursing staff.     Labs reviewed: [x] Yes  Last EKG in EMR reviewed: [x] Yes    Medications  Current Facility-Administered Medications: [START ON 2/7/2022] QUEtiapine (SEROQUEL) tablet 200 mg, 200 mg, Oral, Nightly  QUEtiapine (SEROQUEL) tablet 100 mg, 100 mg, Oral, Once  QUEtiapine (SEROQUEL) tablet 75 mg, 75 mg, Oral, BID- 8&2  traZODone (DESYREL) tablet 50 mg, 50 mg, Oral, Nightly PRN  amLODIPine (NORVASC) tablet 5 mg, 5 mg, Oral, Daily  atorvastatin (LIPITOR) tablet 40 mg, 40 mg, Oral, Nightly  cetirizine (ZYRTEC) tablet 10 mg, 10 mg, Oral, Daily  fluticasone (FLONASE) 50 MCG/ACT nasal spray 2 spray, 2 spray, Each Nostril, Daily  lisinopril (PRINIVIL;ZESTRIL) tablet 40 mg, 40 mg, Oral, Daily  melatonin tablet 1.5 mg, 1.5 mg, Oral, Nightly PRN  pantoprazole (PROTONIX) tablet 40 mg, 40 mg, Oral, QAM AC  albuterol sulfate  (90 Base) MCG/ACT inhaler 2 puff, 2 puff, Inhalation, Q6H PRN  buprenorphine-naloxone (SUBOXONE) 8-2 MG SL film 1.5 Film, 1.5 Film, SubLINGual, Daily  VORTIoxetine (TRINTELLIX) tablet 10 mg, 10 mg, Oral, Daily  LORazepam (ATIVAN) tablet 1 mg, 1 mg, Oral, Once  acetaminophen (TYLENOL) tablet 650 mg, 650 mg, Oral, Q4H PRN  aluminum & magnesium hydroxide-simethicone (MAALOX) 200-200-20 MG/5ML suspension 30 mL, 30 mL, Oral, Q6H PRN  hydrOXYzine (ATARAX) tablet 50 mg, 50 mg, Oral, TID PRN  ibuprofen (ADVIL;MOTRIN) tablet 400 mg, 400 mg, Oral, Q6H PRN  polyethylene glycol (GLYCOLAX) packet 17 g, 17 g, Oral, Daily PRN  nicotine polacrilex (NICORETTE) gum 2 mg, 2 mg, Oral, Q1H PRN    ASSESSMENT  Bipolar I disorder, current or most recent episode depressed, with psychotic features (HCC)  ELIZA with panic attacks  Cannabis use  Opioid abuse, episodic         HANDOFF  Patient symptoms are: modestly improving   Will continue to titrate Seroquel at bedtime to 200mg  Encourage participation in groups and milieu.   Probable discharge is to be determined    Electronically signed by Marta Ferrer MD on 2/6/22 at 10:00 PM EST      **This report has been created using voice recognition software. It may contain minor errors which are inherent in voice recognition technology. **

## 2022-02-07 NOTE — GROUP NOTE
Group Therapy Note    Date: 2/7/2022    Group Start Time: 1110  Group End Time: 2251  Group Topic: Music Therapy    CHARLES SIFUENTES    [facility]        Group Therapy Note    Attendees: 5/14       Patient's Goal:  Patients shared preferred music and dedicated songs to important people in their lives/themselves. Goals to reflect on relationships; Increase socialization; Increase sense of community; Normalization of the environment;     Notes:  Patient attended and participated in group having positive interactions with peers and staff. Patient was pleasant and engaging throughout. Shared a song and dedicated it to his mother. When asking questions about his mother, patient became tearful and stated \"Oh I can't be talking about this stuff right now. Joanna Layne make me ball like a baby in here. \" Offered support and validation and told patient he would not be required to discuss these details at this time. Patient expressed it was enjoyable to reminisce about positive memories of his mother    Status After Intervention:  Improved    Participation Level:  Active Listener and Interactive    Participation Quality: Appropriate, Attentive and Sharing      Speech:  normal      Thought Process/Content: Logical  Linear      Affective Functioning: Congruent      Mood: euthymic      Level of consciousness:  Alert and Attentive      Response to Learning: Able to verbalize current knowledge/experience and Progressing to goal      Endings: None Reported    Modes of Intervention: Support, Socialization, Exploration, Activity, Media and Reality-testing      Discipline Responsible: Psychoeducational Specialist      Signature:  [facility]

## 2022-02-07 NOTE — PLAN OF CARE
Problem: Altered Mood, Depressive Behavior:  Goal: Able to verbalize and/or display a decrease in depressive symptoms  Description: Able to verbalize and/or display a decrease in depressive symptoms  2/7/2022 0027 by Saman Peter RN  Outcome: Ongoing   Pt rests in his room quietly this shift, is pleasant and cooperative when approached. Admits to feeling depressed and anxious, hopeful that the medication increase will help  Problem: Altered Mood, Depressive Behavior:  Goal: Ability to disclose and discuss suicidal ideas will improve  Description: Ability to disclose and discuss suicidal ideas will improve  2/7/2022 0027 by Saman Peter RN  Outcome: Ongoing   Pt denies thoughts of harming themself and verbally agrees to remain safe while on the unit.  No self harming behaviors are noted this shift    Problem: Altered Mood, Manic Behavior:  Goal: Able to sleep  Description: Able to sleep  2/7/2022 0027 by Saman Peter RN  Outcome: Ongoing   Pt rests in his room quietly this shift  Problem: Pain:  Goal: Pain level will decrease  Description: Pain level will decrease  2/7/2022 0027 by Saman Peter RN  Outcome: Ongoing   Pt is satisfied with pain management

## 2022-02-07 NOTE — PROGRESS NOTES
Daily Progress Note  2/7/2022    Patient Name: Omar Ceron    CHIEF COMPLAINT:  Depression with suicidal ideation with plan and intent to overdose on medications          SUBJECTIVE:      Patient is seen today for a follow up assessment. Patient is compliant with scheduled medications. Patient has not required emergency medications in the past 24 hours. Patient is agreeable to interview in the unit milieu today. Patient reports improvement in symptoms of depression and anxiety today. He continues to endorse some feelings of hopelessness and helplessness but reports that these are improving as well. Patient reports that he slept well last night for the first time since being here and he feels well rested today. He reports that his appetite is normal.    Patient reports improvement in suicidal ideation without current plan or intent. He denies homicidal ideation. He is able to contract for safety on this unit. He denies auditory and visual hallucinations. He denies paranoia. Patient reports that he was suffering from significant racing thoughts however states that they have improved since being on the unit. He reports \"my mind and my body were not in sync but now I feel a lot better. \"  He reports that when he is discharged he plans to go back to his house where he lives with his brother and another roommate. He denies medication side effects or medical concerns at this time. Appetite:  [x] Normal/Adequate/Unchanged  [] Increased  [] Decreased      Sleep:       [] Normal/Adequate/Unchanged  [x] Fair  [] Poor      Group Attendance on Unit:   [x] Yes  [] Selectively    [] No    Medication Side Effects:  Patient denies any medication side effects at the time of assessment. Mental Status Exam  Level of consciousness: Alert and awake. Appearance: Appropriate attire for setting, seated in chair, with fair  grooming and hygiene. Behavior/Motor: Approachable, no psychomotor abnormalities.    Attitude toward examiner: Cooperative, attentive, fair eye contact. Speech: Normal rate, normal volume, normal tone. Mood:  Patient reports \"good\". Affect: Depressed but improving  Thought processes: Linear and coherent. Thought content: Denies homicidal ideation. Suicidal Ideation: Reports improvement in suicidal ideations, without current plan or intent, contracts for safety on the unit. Delusions: No evidence of delusions. Denies paranoia. Perceptual Disturbance: Patient does not appear to be responding to internal stimuli. Denies auditory hallucinations. Denies visual hallucinations. Cognition: Oriented to self, location, time, and situation. Memory: Intact. Insight & Judgement: Poor. Data   height is 5' 10\" (1.778 m) and weight is 177 lb (80.3 kg). His oral temperature is 98 °F (36.7 °C). His blood pressure is 116/99 (abnormal) and his pulse is 98. His respiration is 14 and oxygen saturation is 100%.    Labs:   Admission on 02/02/2022   Component Date Value Ref Range Status    WBC 02/02/2022 11.8* 3.5 - 11.0 k/uL Final    RBC 02/02/2022 4.39* 4.5 - 5.9 m/uL Final    Hemoglobin 02/02/2022 13.6  13.5 - 17.5 g/dL Final    Hematocrit 02/02/2022 40.8* 41 - 53 % Final    MCV 02/02/2022 92.9  80 - 100 fL Final    MCH 02/02/2022 31.1  26 - 34 pg Final    MCHC 02/02/2022 33.4  31 - 37 g/dL Final    RDW 02/02/2022 14.6  11.5 - 14.9 % Final    Platelets 21/18/5155 320  150 - 450 k/uL Final    MPV 02/02/2022 6.1  6.0 - 12.0 fL Final    NRBC Automated 02/02/2022 NOT REPORTED  per 100 WBC Final    Differential Type 02/02/2022 NOT REPORTED   Final    Seg Neutrophils 02/02/2022 80* 36 - 66 % Final    Lymphocytes 02/02/2022 13* 24 - 44 % Final    Monocytes 02/02/2022 5  1 - 7 % Final    Eosinophils % 02/02/2022 1  0 - 4 % Final    Basophils 02/02/2022 1  0 - 2 % Final    Immature Granulocytes 02/02/2022 NOT REPORTED  0 % Final    Segs Absolute 02/02/2022 9.50* 1.3 - 9.1 k/uL Final    Absolute Lymph # 02/02/2022 NEGATIVE  NEGATIVE Final    Bilirubin Urine 02/02/2022 NEGATIVE  NEGATIVE Final    Ketones, Urine 02/02/2022 NEGATIVE  NEGATIVE Final    Specific Gravity, UA 02/02/2022 1.016  1.000 - 1.030 Final    Urine Hgb 02/02/2022 1+* NEGATIVE Final    pH, UA 02/02/2022 6.5  5.0 - 8.0 Final    Protein, UA 02/02/2022 NEGATIVE  NEGATIVE Final    Urobilinogen, Urine 02/02/2022 Normal  Normal Final    Nitrite, Urine 02/02/2022 NEGATIVE  NEGATIVE Final    Leukocyte Esterase, Urine 02/02/2022 NEGATIVE  NEGATIVE Final    Urinalysis Comments 02/02/2022 NOT REPORTED   Final    Ethanol 02/02/2022 <10  <10 mg/dL Final    Ethanol percent 02/02/2022 <0.010  % Final    Amphetamine Screen, Ur 02/02/2022 NEGATIVE  NEGATIVE Final    Comment:       (Positive cutoff 1000 ng/mL)                  Barbiturate Screen, Ur 02/02/2022 NEGATIVE  NEGATIVE Final    Comment:       (Positive cutoff 200 ng/mL)                  Benzodiazepine Screen, Urine 02/02/2022 NEGATIVE  NEGATIVE Final    Comment:       (Positive cutoff 200 ng/mL)                  Cocaine Metabolite, Urine 02/02/2022 NEGATIVE  NEGATIVE Final    Comment:       (Positive cutoff 300 ng/mL)                  Methadone Screen, Urine 02/02/2022 NEGATIVE  NEGATIVE Final    Comment:       (Positive cutoff 300 ng/mL)                  Opiates, Urine 02/02/2022 NEGATIVE  NEGATIVE Final    Comment:       (Positive cutoff 300 ng/mL)                  Phencyclidine, Urine 02/02/2022 NEGATIVE  NEGATIVE Final    Comment:       (Positive cutoff 25 ng/mL)                  Propoxyphene, Urine 02/02/2022 NOT REPORTED  NEGATIVE Final    Cannabinoid Scrn, Ur 02/02/2022 POSITIVE* NEGATIVE Final    Comment:       (Positive cutoff 50 ng/mL)                  Oxycodone Screen, Ur 02/02/2022 NEGATIVE  NEGATIVE Final    Comment:       (Positive cutoff 100 ng/mL)                  Methamphetamine, Urine 02/02/2022 NOT REPORTED  NEGATIVE Final    Tricyclic Antidepressants, Urine 02/02/2022 NOT REPORTED  NEGATIVE Final    MDMA, Urine 02/02/2022 NOT REPORTED  NEGATIVE Final    Buprenorphine Urine 02/02/2022 NOT REPORTED  NEGATIVE Final    Test Information 02/02/2022 Assay provides medical screening only. The absence of expected drug(s) and/or metabolite(s) may indicate diluted or adulterated urine, limitations of testing or timing of collection. Final    Comment: Testing for legal purposes should be confirmed by another method. To request confirmation   of test result, please call the lab within 7 days of sample submission.  Specimen Description 02/02/2022 . NASOPHARYNGEAL SWAB   Final    SARS-CoV-2, Rapid 02/02/2022 Not Detected  Not Detected Final    Comment:       Rapid NAAT:  The specimen is NEGATIVE for SARS-CoV-2, the novel coronavirus associated with   COVID-19. The ID NOW COVID-19 assay is designed to detect the virus that causes COVID-19 in patients   with signs and symptoms of infection who are suspected of COVID-19. An individual without symptoms of COVID-19 and who is not shedding SARS-CoV-2 virus would   expect to have a negative (not detected) result in this assay. Negative results should be treated as presumptive and, if inconsistent with clinical signs   and symptoms or necessary for patient management,  should be tested with an alternative molecular assay. Negative results do not preclude   SARS-CoV-2 infection and   should not be used as the sole basis for patient management decisions.          Fact sheet for Healthcare Providers: [place]  Fact sheet for Patients: [employer]          Methodology: Isothermal Nucleic Acid Amplification      - 02/02/2022        Final    WBC, UA 02/02/2022 0 TO 2  /HPF Final    RBC, UA 02/02/2022 5 TO 10  /HPF Final    Casts UA 02/02/2022 NOT REPORTED  /LPF Final    Crystals, UA 02/02/2022 NOT REPORTED  None /HPF Final    Epithelial Cells UA 02/02/2022 0 TO 2  /HPF Final    Renal Epithelial, UA 02/02/2022 NOT REPORTED  0 /HPF Final    Bacteria, UA 02/02/2022 FEW* None Final    Mucus, UA 02/02/2022 NOT REPORTED  None Final    Trichomonas, UA 02/02/2022 NOT REPORTED  None Final    Amorphous, UA 02/02/2022 NOT REPORTED  None Final    Other Observations UA 02/02/2022 NOT REPORTED  NOT REQ. Final    Yeast, UA 02/02/2022 NOT REPORTED  None Final    TSH 02/05/2022 1.41  0.30 - 5.00 mIU/L Final         Reviewed patient's current plan of care and vital signs with nursing staff.     Labs reviewed: [x] Yes    Medications  Current Facility-Administered Medications: QUEtiapine (SEROQUEL) tablet 200 mg, 200 mg, Oral, Nightly  QUEtiapine (SEROQUEL) tablet 100 mg, 100 mg, Oral, Once  QUEtiapine (SEROQUEL) tablet 75 mg, 75 mg, Oral, BID- 8&2  traZODone (DESYREL) tablet 50 mg, 50 mg, Oral, Nightly PRN  amLODIPine (NORVASC) tablet 5 mg, 5 mg, Oral, Daily  atorvastatin (LIPITOR) tablet 40 mg, 40 mg, Oral, Nightly  cetirizine (ZYRTEC) tablet 10 mg, 10 mg, Oral, Daily  fluticasone (FLONASE) 50 MCG/ACT nasal spray 2 spray, 2 spray, Each Nostril, Daily  lisinopril (PRINIVIL;ZESTRIL) tablet 40 mg, 40 mg, Oral, Daily  melatonin tablet 1.5 mg, 1.5 mg, Oral, Nightly PRN  pantoprazole (PROTONIX) tablet 40 mg, 40 mg, Oral, QAM AC  albuterol sulfate  (90 Base) MCG/ACT inhaler 2 puff, 2 puff, Inhalation, Q6H PRN  buprenorphine-naloxone (SUBOXONE) 8-2 MG SL film 1.5 Film, 1.5 Film, SubLINGual, Daily  VORTIoxetine (TRINTELLIX) tablet 10 mg, 10 mg, Oral, Daily  LORazepam (ATIVAN) tablet 1 mg, 1 mg, Oral, Once  acetaminophen (TYLENOL) tablet 650 mg, 650 mg, Oral, Q4H PRN  aluminum & magnesium hydroxide-simethicone (MAALOX) 200-200-20 MG/5ML suspension 30 mL, 30 mL, Oral, Q6H PRN  hydrOXYzine (ATARAX) tablet 50 mg, 50 mg, Oral, TID PRN  ibuprofen (ADVIL;MOTRIN) tablet 400 mg, 400 mg, Oral, Q6H PRN  polyethylene glycol (GLYCOLAX) packet 17 g, 17 g, Oral, Daily PRN  nicotine polacrilex (NICORETTE) gum 2 mg, 2 mg, Oral, Q1H PRN    ASSESSMENT  Bipolar I disorder, current or most recent episode depressed, with psychotic features [facility])         HANDOFF  Patient symptoms are: Modestly Improving. Medications as decided by attending physician  Monitor need and frequency of PRN medications. Encourage participation in groups and milieu. Probable discharge is to be determined by MD.     Electronically signed by JORGE Cannon CNP on 2/7/2022 at 2:30 PM    **This report has been created using voice recognition software. It may contain minor errors which are inherent in voice recognition technology. **                                         Psychiatry Attending Attestation     I independently saw and evaluated the patient. I reviewed the Advance Practice Provider's documentation above. Any additional comments or changes to the Advance Practice Provider's documentation are stated below otherwise agree with assessment. Patient reports that his suicidal thoughts were worse when he woke up at 5 AM this morning. Reports feeling sad down and low all morning. Mentions that his mood was getting better as the day went by. Tolerating medications well and denies any side effect from the medication. Discussed with him about continue to titrate Trintellix and he is agreeable to the plan. Mentions that he slept from 4 hours to 6 hours after starting Seroquel 200 mg at nighttime. PLAN  Patient s symptoms   show no change today  Will continue to titrate Trintellix  Attempt to develop insight  Psycho-education conducted. Supportive Therapy conducted.   Probable discharge is tomorrow  Follow-up TBD      Electronically signed by Harini Dan MD on 2/7/22 at 7:48 PM EST

## 2022-02-07 NOTE — PLAN OF CARE
Problem: Altered Mood, Depressive Behavior:  Goal: Ability to disclose and discuss suicidal ideas will improve  Description: Ability to disclose and discuss suicidal ideas will improve  2/7/2022 1048 by Megan Ordonez LPN  Outcome: Ongoing   Patient denies any suicidal ideations. Patient is selectively social with peers. Problem: Altered Mood, Depressive Behavior:  Goal: Able to verbalize and/or display a decrease in depressive symptoms  Description: Able to verbalize and/or display a decrease in depressive symptoms  2/7/2022 1048 by Megan Ordonez LPN  Outcome: Ongoing   Patient does come out and watch television in the dayroom. Patient is medication compliant and well groomed. Patient eats all meals. Every 15 min checks maintained for pt safety.

## 2022-02-08 VITALS
DIASTOLIC BLOOD PRESSURE: 71 MMHG | RESPIRATION RATE: 14 BRPM | SYSTOLIC BLOOD PRESSURE: 126 MMHG | WEIGHT: 177 LBS | BODY MASS INDEX: 25.34 KG/M2 | HEIGHT: 70 IN | HEART RATE: 70 BPM | TEMPERATURE: 98.7 F | OXYGEN SATURATION: 100 %

## 2022-02-08 PROCEDURE — 99239 HOSP IP/OBS DSCHRG MGMT >30: CPT | Performed by: PSYCHIATRY & NEUROLOGY

## 2022-02-08 PROCEDURE — 6370000000 HC RX 637 (ALT 250 FOR IP): Performed by: PSYCHIATRY & NEUROLOGY

## 2022-02-08 PROCEDURE — 6370000000 HC RX 637 (ALT 250 FOR IP): Performed by: NURSE PRACTITIONER

## 2022-02-08 RX ORDER — ATORVASTATIN CALCIUM 40 MG/1
40 TABLET, FILM COATED ORAL NIGHTLY
Qty: 30 TABLET | Refills: 0 | Status: SHIPPED | OUTPATIENT
Start: 2022-02-08

## 2022-02-08 RX ORDER — AMLODIPINE BESYLATE 5 MG/1
5 TABLET ORAL DAILY
Qty: 30 TABLET | Refills: 0 | Status: SHIPPED | OUTPATIENT
Start: 2022-02-09

## 2022-02-08 RX ORDER — QUETIAPINE FUMARATE 200 MG/1
200 TABLET, FILM COATED ORAL NIGHTLY
Qty: 30 TABLET | Refills: 0 | Status: SHIPPED | OUTPATIENT
Start: 2022-02-08

## 2022-02-08 RX ORDER — HYDROXYZINE 50 MG/1
50 TABLET, FILM COATED ORAL 3 TIMES DAILY PRN
Qty: 90 TABLET | Refills: 0 | Status: SHIPPED | OUTPATIENT
Start: 2022-02-08 | End: 2022-03-10

## 2022-02-08 RX ORDER — LISINOPRIL 40 MG/1
40 TABLET ORAL DAILY
Qty: 30 TABLET | Refills: 0 | Status: SHIPPED | OUTPATIENT
Start: 2022-02-08

## 2022-02-08 RX ORDER — QUETIAPINE FUMARATE 50 MG/1
75 TABLET, FILM COATED ORAL 2 TIMES DAILY
Qty: 90 TABLET | Refills: 0 | Status: SHIPPED | OUTPATIENT
Start: 2022-02-08

## 2022-02-08 RX ORDER — CETIRIZINE HYDROCHLORIDE 10 MG/1
10 TABLET ORAL DAILY
Qty: 30 TABLET | Refills: 0 | Status: SHIPPED | OUTPATIENT
Start: 2022-02-08

## 2022-02-08 RX ORDER — TRAZODONE HYDROCHLORIDE 50 MG/1
50 TABLET ORAL NIGHTLY PRN
Qty: 30 TABLET | Refills: 0 | Status: SHIPPED | OUTPATIENT
Start: 2022-02-08

## 2022-02-08 RX ORDER — BUPRENORPHINE AND NALOXONE 8; 2 MG/1; MG/1
1.5 FILM, SOLUBLE BUCCAL; SUBLINGUAL DAILY
Qty: 9 FILM | Refills: 0 | Status: SHIPPED | OUTPATIENT
Start: 2022-02-09 | End: 2022-02-15

## 2022-02-08 RX ADMIN — PANTOPRAZOLE SODIUM 40 MG: 40 TABLET, DELAYED RELEASE ORAL at 07:39

## 2022-02-08 RX ADMIN — CETIRIZINE HYDROCHLORIDE TABLETS 10 MG: 10 TABLET, FILM COATED ORAL at 07:39

## 2022-02-08 RX ADMIN — NICOTINE POLACRILEX 2 MG: 2 GUM, CHEWING BUCCAL at 10:37

## 2022-02-08 RX ADMIN — LISINOPRIL 40 MG: 20 TABLET ORAL at 07:39

## 2022-02-08 RX ADMIN — AMLODIPINE BESYLATE 5 MG: 5 TABLET ORAL at 07:40

## 2022-02-08 RX ADMIN — FLUTICASONE PROPIONATE 2 SPRAY: 50 SPRAY, METERED NASAL at 07:39

## 2022-02-08 RX ADMIN — QUETIAPINE FUMARATE 75 MG: 50 TABLET ORAL at 14:11

## 2022-02-08 RX ADMIN — NICOTINE POLACRILEX 2 MG: 2 GUM, CHEWING BUCCAL at 12:30

## 2022-02-08 RX ADMIN — VORTIOXETINE 15 MG: 10 TABLET, FILM COATED ORAL at 07:39

## 2022-02-08 RX ADMIN — NICOTINE POLACRILEX 2 MG: 2 GUM, CHEWING BUCCAL at 08:53

## 2022-02-08 RX ADMIN — QUETIAPINE FUMARATE 75 MG: 50 TABLET ORAL at 07:40

## 2022-02-08 RX ADMIN — BUPRENORPHINE AND NALOXONE 1.5 FILM: 8; 2 FILM BUCCAL; SUBLINGUAL at 07:41

## 2022-02-08 NOTE — PROGRESS NOTES
CLINICAL PHARMACY NOTE: MEDS TO BEDS    Total # of Prescriptions Filled: 11   The following medications were delivered to the patient:  · Hydroxyzine  · Norvasc  · Lisinopril  · Trazodone  · Zyrtec  · Lipitor  · Trintellix 10  · Seroquel 200  · Seroquel 50  · Tintellix 5mg  · Seroquel 25    Additional Documentation:

## 2022-02-08 NOTE — GROUP NOTE
Group Therapy Note    Date: 2/8/2022    Group Start Time: 4811  Group End Time: 1440  Group Topic: Music Therapy    CHARLES SIFUENTES    Chip Meeks        Group Therapy Note    Attendees: 5/15       Patient's Goal:  Patients worked together to create a play list of music going calm calm/relaxing music and gradually becoming more energetic. Goals to elevate mood and increase energy; Increase sense of community; Increase socialization; Normalization of the environment;    Notes:  Patient attended and participated in group having positive interactions with peers and staff. Patient was pleasant and engaging throughout. Contributed to group play list and engaged in conversations about music appropriately with peers. Status After Intervention:  Improved    Participation Level:  Active Listener and Interactive    Participation Quality: Appropriate, Attentive and Sharing      Speech:  normal      Thought Process/Content: Logical  Linear      Affective Functioning: Congruent      Mood: euthymic      Level of consciousness:  Alert and Attentive      Response to Learning: Able to verbalize current knowledge/experience and Progressing to goal      Endings: None Reported    Modes of Intervention: Socialization, Exploration, Activity, Media and Reality-testing      Discipline Responsible: Psychoeducational Specialist      Signature:  Chip Meeks

## 2022-02-08 NOTE — PLAN OF CARE
Problem: Altered Mood, Depressive Behavior:  Goal: Able to verbalize and/or display a decrease in depressive symptoms  Description: Able to verbalize and/or display a decrease in depressive symptoms  2/7/2022 2212 by Svitlana Love RN  Outcome: Ongoing  Pt rests in his room quietly this shift, he is pleasant and cooperative when approached. He is hopeful that the medication increase will help, admits to feeling depressed and anxious  Problem: Altered Mood, Depressive Behavior:  Goal: Ability to disclose and discuss suicidal ideas will improve  Description: Ability to disclose and discuss suicidal ideas will improve  2/7/2022 2212 by Svitlana Love RN  Outcome: Ongoing   Pt denies thoughts of harming themself and verbally agrees to remain safe while on the unit.  No self harming behaviors are noted this shift    Problem: Pain:  Goal: Pain level will decrease  Description: Pain level will decrease  2/7/2022 2212 by Svitlana Love RN  Outcome: Ongoing   Pt is satisfied with pain management

## 2022-02-08 NOTE — GROUP NOTE
Group Therapy Note    Date: 2/8/2022    Group Start Time: 1000  Group End Time: 0315  Group Topic: Psychotherapy    [facility]    JAIMEE Ojeda LSW        Group Therapy Note    Attendees: 7/15         Patient's Goal:  Increase interpersonal relationship skills    Notes: Patient was an active participant group discussion    Status After Intervention:  Improved    Participation Level:  Active Listener and Interactive    Participation Quality: Appropriate, Attentive, Sharing and Supportive      Speech:  normal      Thought Process/Content: Logical  Linear      Affective Functioning: Congruent      Mood: euthymic      Level of consciousness:  Alert, Oriented x4 and Attentive      Response to Learning: Able to verbalize current knowledge/experience, Able to verbalize/acknowledge new learning, Able to retain information, Capable of insight, Able to change behavior and Progressing to goal      Endings: None Reported    Modes of Intervention: Support, Socialization and Exploration      Discipline Responsible: /Counselor      Signature:  JAIMEE Ojeda LSW

## 2022-02-08 NOTE — BH NOTE
[id number] [facility]  Discharge Note    Pt discharged with followings belongings:   Dental Appliances: None  Vision - Corrective Lenses: None  Hearing Aid: None  Jewelry: None  Body Piercings Removed: N/A  Clothing: Footwear,Pants,Shirt,Socks,Undergarments (Comment),Other (Comment) (belt, hat)  Were All Patient Medications Collected?: Yes  Other Valuables: Cell phone,Money (Comment),Wallet   Valuables sent home with patient  or returned to patient. Patient education on aftercare instructions: yes  Information faxed to  [facility] by RN  at 4:21 PM .Patient verbalize understanding of AVS:  yes. Status EXAM upon discharge:  Status and Exam  Normal: Yes  Facial Expression: Brightened  Affect: Appropriate  Level of Consciousness: Alert  Mood:Normal: No  Mood: Anxious  Motor Activity:Normal: Yes  Motor Activity: Decreased  Interview Behavior: Cooperative  Preception: Baker to Person,Baker to Time,Baker to Place,Baker to Situation  Attention:Normal: Yes  Attention: Distractible  Thought Processes: Circumstantial  Thought Content:Normal: No  Thought Content: Preoccupations  Hallucinations: None  Delusions: No  Memory:Normal: Yes  Insight and Judgment: Yes  Insight and Judgment: Unmotivated  Present Suicidal Ideation: No  Present Homicidal Ideation: No      Metabolic Screening:    Lab Results   Component Value Date    LABA1C 5.6 12/08/2021       Lab Results   Component Value Date    CHOL 137 12/08/2021    CHOL 219 (H) 05/04/2018    CHOL 159 07/17/2014     Lab Results   Component Value Date    TRIG 47 12/08/2021    TRIG 89 05/04/2018    TRIG 95 07/17/2014     Lab Results   Component Value Date    HDL 38 (L) 12/08/2021    HDL 39 (L) 05/04/2018    HDL 54 07/17/2014     No components found for: LDLCAL  No results found for: LABVLDL   Patient transported home by friend.   Araceli Cash RN

## 2022-02-08 NOTE — BH NOTE
Patient given tobacco quitline number 71122441394 at this time, refusing to call at this time, states \" I just dont want to quit now\"- patient given information as to the dangers of long term tobacco use. Continue to reinforce the importance of tobacco cessation.

## 2022-02-08 NOTE — GROUP NOTE
Group Therapy Note    Date: 2/8/2022    Group Start Time: 1105  Group End Time: 8835  Group Topic: Recreational    CHARLES SIFUENTES    Dennis Jessica        Group Therapy Note    Attendees: 5/15       Patient's Goal:  Patients engaged in [place] or disagree group\" sharing opinions about a variety of topics and their thoughts about these topics. Goals to increase socialization; increase sense of community; Practice non-confrontational disagreement and conversational skills; Notes:  Patient attended and participated in group having positive interactions with peers and staff. Patient was pleasant and engaging throughout and engaging in conversations appropriately throughout group. Did use a lot of profanity and became loud using humor with peers. Status After Intervention:  Improved    Participation Level:  Active Listener and Interactive    Participation Quality: Appropriate, Attentive, Sharing and Supportive      Speech:  Normal/ loud at times      Thought Process/Content: Logical  Linear      Affective Functioning: Congruent      Mood: euthymic      Level of consciousness:  Alert and Attentive      Response to Learning: Able to verbalize current knowledge/experience and Progressing to goal      Endings: None Reported    Modes of Intervention: Socialization, Exploration, Clarifying, Problem-solving, Activity, Confrontation and Reality-testing      Discipline Responsible: Psychoeducational Specialist      Signature:  Dennis Jessica 134

## 2022-02-08 NOTE — GROUP NOTE
Group Therapy Note    Date: 2/8/2022    Group Start Time: 0900  Group End Time: 0915  Group Topic: Group Therapy    [facility]    Kirsty Conte RN        Group Therapy Note    Attendees: 6/15         Patient's Goal:  Patient will verbalize a goal this is set for today     Notes:  Patient participated well in goals group     Status After Intervention:  Improved    Participation Level:  Active Listener and Interactive    Participation Quality: Appropriate, Attentive and Sharing      Speech:  normal      Thought Process/Content: Logical      Affective Functioning: Congruent      Mood: stable      Level of consciousness:  Alert, Oriented x4 and Attentive      Response to Learning: Able to verbalize current knowledge/experience, Able to verbalize/acknowledge new learning, Able to retain information and Capable of insight      Endings: Self-harm    Modes of Intervention: Education, Support, Socialization and Exploration      Discipline Responsible: Registered Nurse      Signature:  Kirsty Conte RN

## 2022-02-09 NOTE — CARE COORDINATION
Name: Bhavin Wiggins    : 1965    Discharge Date: 2022    Primary Auth/Cert #: [id number]    Destination: Private residence    Discharge Medications:      Medication List      START taking these medications    buprenorphine-naloxone 8-2 MG Film SL film  Commonly known as: SUBOXONE  Place 1.5 Film under the tongue daily for 6 days. Notes to patient: Opiate management     hydrOXYzine 50 MG tablet  Commonly known as: ATARAX  Take 1 tablet by mouth 3 times daily as needed for Anxiety  Notes to patient: anxiety     * QUEtiapine 200 MG tablet  Commonly known as: SEROQUEL  Take 1 tablet by mouth nightly  Notes to patient: Clears thoughts     * QUEtiapine 50 MG tablet  Commonly known as: SEROQUEL  Take 1.5 tablets by mouth 2 times daily at 0800 and 1400  Notes to patient: Clears thoughts     traZODone 50 MG tablet  Commonly known as: DESYREL  Take 1 tablet by mouth nightly as needed for Sleep  Notes to patient: Sleep aid     VORTIoxetine 10 MG Tabs tablet  Commonly known as: TRINTELLIX  Take 1.5 tablets by mouth daily  Notes to patient: Lowers depression         * This list has 2 medication(s) that are the same as other medications prescribed for you. Read the directions carefully, and ask your doctor or other care provider to review them with you. CHANGE how you take these medications    amLODIPine 5 MG tablet  Commonly known as: NORVASC  Take 1 tablet by mouth daily  What changed: See the new instructions.   Notes to patient: Blood pressure        CONTINUE taking these medications    atorvastatin 40 MG tablet  Commonly known as: LIPITOR  Take 1 tablet by mouth nightly  Notes to patient: cholesterol     cetirizine 10 MG tablet  Commonly known as: ZYRTEC  Take 1 tablet by mouth daily  Notes to patient: allergy     fluticasone 50 MCG/ACT nasal spray  Commonly known as: FLONASE  instill 1 spray into each nostril once daily  Notes to patient: Nasal spray     ibuprofen 400 MG tablet  Commonly known as: ADVIL;MOTRIN  Take 1 tablet by mouth every 6 hours as needed for Pain  Notes to patient: pain     lisinopril 40 MG tablet  Commonly known as: PRINIVIL;ZESTRIL  Take 1 tablet by mouth daily  Notes to patient: Blood pressure     melatonin 3 MG Tabs tablet  Take 0.5 tablets by mouth nightly as needed (insomnia)  Notes to patient: Sleep aid     omeprazole 20 MG delayed release capsule  Commonly known as: PRILOSEC  Take 1 capsule by mouth Daily  Notes to patient: Acid reflux     Ventolin  (90 Base) MCG/ACT inhaler  Generic drug: albuterol sulfate HFA  Inhale 2 puffs into the lungs every 6 hours as needed for Wheezing  Notes to patient: Helps with breathing        STOP taking these medications    risperiDONE 0.5 MG tablet  Commonly known as: RISPERDAL           Where to Get Your Medications      These medications were sent to [address] [place] [id number]    Phone: [phone]   · buprenorphine-naloxone 8-2 MG Film SL film     These medications were sent to [facility], [address] [place] [address] [phone]  [address] [address]    Phone: [phone]   · amLODIPine 5 MG tablet  · atorvastatin 40 MG tablet  · cetirizine 10 MG tablet  · hydrOXYzine 50 MG tablet  · lisinopril 40 MG tablet  · QUEtiapine 200 MG tablet  · QUEtiapine 50 MG tablet  · traZODone 50 MG tablet  · VORTIoxetine 10 MG Tabs tablet         Follow Up Appointment: [facility]  [address] [facility]  Phone: [phone]  On 2/14/2022  You will receive a phone call for an intake appointment from [facility] at [address] [facility]  [address] [address] [facility]  Phone: [phone]  On 2/15/2022  You have a dual assessment scheduled with Janna Lopez at 10:00 AM

## 2022-02-09 NOTE — DISCHARGE SUMMARY
Provider Discharge Summary     Patient ID:  Iván Cornell  [id number]  41 y.o.  1965    Admit date: 2/2/2022    Discharge date and time: 2/8/2022  8:54 PM     Admitting Physician: aL Vega MD     Discharge Physician: La Vega MD    Admission Diagnoses: Suicidal ideation [R45.851]  Acute psychosis ([address] [F23]    Discharge Diagnoses:      Bipolar I disorder, current or most recent episode depressed, with psychotic features [facility])     Patient Active Problem List   Diagnosis Code    Severe bipolar I disorder, current or most recent episode depressed ([address] F31.4    HTN (hypertension) I10    Irritable bowel syndrome K58.9    GERD (gastroesophageal reflux disease) K21.9    Chronic back pain M54.9, G89.29    Chronic diarrhea K52.9    Opioid withdrawal ([address] F11.23    Depression with suicidal ideation F32. A, R45.851    Bipolar affective disorder, mixed, severe, with psychotic behavior ([address] F31.64    Polysubstance abuse ([address] F19.10    Generalized anxiety disorder with panic attacks F41.1, F41.0    Acute psychosis ([address] F23    Bipolar I disorder, current or most recent episode depressed, with psychotic features ([address] F31.5    Cannabis abuse F12.10    Opioid abuse, episodic ([address] F11.10        Admission Condition: poor    Discharged Condition: stable    Indication for Admission: threat to self    History of Present Illnes (present tense wording is of findings from admission exam and are not necessarily indicative of current findings):   Iván Cornell is a 55 y.o. male who has a past medical history of hypertension, irritable bowel syndrome, GERD, opioid abuse, cannabis abuse, and bipolar I disorder with and without psychotic features.  Patient presented to the ED with suicidal ideation.     ED note:  81-year-old male history of bipolar, hypertension, anxiety presents for evaluation of suicidal ideations.  Symptoms been worsening over the past 1 to 2 weeks.  Patient has not taken his medicine in several weeks. Shiva Ronal states he has been having thoughts of overdosing on pills. [facility] does occasionally hear nonspecific voices.  No actual attempted self-harm.  Was admitted with similar symptoms in December of last year.  No other physical symptoms.  Symptoms are moderate and progressive.     Patient was seen today for initial evaluation. Nursing staff report he has been cooperative and his interactions with peers and staff have been appropriate. He participated in morning groups prior to conversation. Patient was tearful on approach and reports that he has not been doing well. He expresses frustration and difficulty maintaining employment due to mental illness. He endorses losing a job last week as a  and reported he was making good money. Patient states he has been experiencing increased symptoms of depression for more than 2 weeks. He experiences the symptoms all day every day. He has been having trouble falling asleep and staying asleep, anhedonia, decreased motivation, feelings of worthlessness, decreased energy and concentration, hopelessness and helplessness, decreased appetite, and intermittent suicidal ideation. He continues to express thoughts of suicide and not wanting to be alive today. He reports that if he were not here he would probably overdose on \"pills\". Patient is also endorsing high level of anxiety lately. He has noticed excessive worry, restlessness, feeling on edge and irritable, tension, nervousness, and has obsessive and racing thoughts. He also endorses having a panic attack prior to admission. When experiencing panic attacks he experiences trembling, sweating, racing heart, shortness of breath, and impending sense of doom. Patient reports that since discharge from [facility] in December 2021, he has not maintained medication adherence.   [facility].     Patient endorses a history of past manic episode but does not report any symptoms of gage since December 2021. When manic he endorses difficulty getting more than 3 hours of sleep per night over several nights, increased goal-directed activity, over the top mood, increased impulsivity, racing thoughts and speech, and irritability. Patient does endorse a traumatic past but would not like to discuss it at this time. Patient denies any symptoms of a cluster B personality disorder but does endorse feelings of emptiness\" when he is depressed.     Patient endorses a history of psychosis when he is depressed or manic. He does feel like his mind plays tricks on him and he will hear sounds or voices but cannot identify where they are coming from. He denies that the voices say specific things to him but describes them more as \"mumbles\". Most recent auditory hallucinations were yesterday. He denies visual hallucinations. He is endorsing significant levels of paranoia and often worries that he is being watched or followed. His paranoia has decreased slightly since admission to this facility as he feels safer here. He denies any ideas of reference or thought insertion. He does not believe he has any magical carpenter or abilities.     Patient endorses a history of drug abuse. He has struggled with opioid dependence over several years. He does endorse abusing Percocet last week. He endorses almost daily cannabis use. Patient reports using Suboxone daily and asks to continue taking it. He denies any recent alcohol abuse. He is also asking specifically for gabapentin. He reports that gabapentin helps his anxiety better than anything else. Patient states \"I just need to get right, I just need to get my meds right and may be apply for disability income\". Patient is able to contract for safety on the unit however at this time he is unable to contract for safety in the community.   He may benefit from ongoing hospitalization for stabilization, medication management, and therapeutic groups and milieu. Hospital Course:   Upon admission, Bridget Guzman was provided a safe secure environment, introduced to unit milieu. Patient participated in groups and individual therapies. Meds were adjusted as noted below. After few days of hospital care, patient began to feel improvement. Depression lifted, thoughts to harm self ceased. Sleep improved, appetite was good. On morning rounds 2/8/2022, Bridget Guzman endorses feeling ready for discharge. Patient denies suicidal or homicidal ideations, denies hallucinations or delusions. Denies SE's from meds. It was decided that maximum benefit from hospital care had been achieved and patient can be discharged. Consults:   none    Significant Diagnostic Studies: Routine labs and diagnostics    Treatments: Psychotropic medications, therapy with group, milieu, and 1:1 with nurses, social workers, PALESLIE/CNP, and Attending physician. Discharge Medications:  Discharge Medication List as of 2/8/2022 10:41 AM      START taking these medications    Details   hydrOXYzine (ATARAX) 50 MG tablet Take 1 tablet by mouth 3 times daily as needed for Anxiety, Disp-90 tablet, R-0Normal      traZODone (DESYREL) 50 MG tablet Take 1 tablet by mouth nightly as needed for Sleep, Disp-30 tablet, R-0Normal      VORTIoxetine (TRINTELLIX) 10 MG TABS tablet Take 1.5 tablets by mouth daily, Disp-45 tablet, R-0Normal      !! QUEtiapine (SEROQUEL) 200 MG tablet Take 1 tablet by mouth nightly, Disp-30 tablet, R-0Normal      !! QUEtiapine (SEROQUEL) 50 MG tablet Take 1.5 tablets by mouth 2 times daily at 0800 and 1400, Disp-90 tablet, R-0Normal      buprenorphine-naloxone (SUBOXONE) 8-2 MG FILM SL film Place 1.5 Film under the tongue daily for 6 days. , Disp-9 Film, R-0Normal       !! - Potential duplicate medications found. Please discuss with provider.       CONTINUE these medications which have CHANGED    Details   cetirizine (ZYRTEC) 10 MG tablet Take 1 tablet by mouth daily, Disp-30 tablet, R-0Normal      atorvastatin (LIPITOR) 40 MG tablet Take 1 tablet by mouth nightly, Disp-30 tablet, R-0Normal      lisinopril (PRINIVIL;ZESTRIL) 40 MG tablet Take 1 tablet by mouth daily, Disp-30 tablet, R-0Normal      amLODIPine (NORVASC) 5 MG tablet Take 1 tablet by mouth daily, Disp-30 tablet, R-0Normal         CONTINUE these medications which have NOT CHANGED    Details   fluticasone (FLONASE) 50 MCG/ACT nasal spray instill 1 spray into each nostril once daily, Disp-16 g, R-0Normal      ibuprofen (ADVIL;MOTRIN) 400 MG tablet Take 1 tablet by mouth every 6 hours as needed for Pain, Disp-120 tablet, R-0Normal      melatonin 3 MG TABS tablet Take 0.5 tablets by mouth nightly as needed (insomnia), Disp-30 tablet, R-0Normal      omeprazole (PRILOSEC) 20 MG delayed release capsule Take 1 capsule by mouth Daily, Disp-30 capsule, R-0Normal      VENTOLIN  (90 Base) MCG/ACT inhaler Inhale 2 puffs into the lungs every 6 hours as needed for Wheezing, Disp-1 each, R-0, DAWNormal         STOP taking these medications       risperiDONE (RISPERDAL) 0.5 MG tablet Comments:   Reason for Stopping:                Core Measures statement:   Not applicable    Discharge Exam:  Level of consciousness:  Within normal limits  Appearance: Street clothes, seated, with good grooming  Behavior/Motor: No abnormalities noted  Attitude toward examiner:  Cooperative, attentive, good eye contact  Speech:  spontaneous, normal rate, normal volume and well articulated  Mood:  euthymic  Affect:  Full range  Thought processes:  linear, goal directed and coherent  Thought content:  denies homicidal ideation  Suicidal Ideation:  denies suicidal ideation  Delusions:  no evidence of delusions  Perceptual Disturbance:  denies any perceptual disturbance  Cognition:  Intact  Memory: age appropriate  Insight & Judgement: fair  Medication side effects: denies     Disposition: home    Patient Instructions: Activity: activity as tolerated  1.  Patient instructed to take medications regularly and follow up with outpatient appointments. Follow-up as scheduled with [facility]       Signed:    Electronically signed by Fan You MD on 2/8/22 at 8:54 PM EST    Time Spent on discharge is more than 33 minutes in the examination, evaluation, counseling and review of medications and discharge plan.

## 2022-03-14 ENCOUNTER — TELEPHONE (OUTPATIENT)
Dept: INTERNAL MEDICINE | Age: 57
End: 2022-03-14

## 2023-08-29 ENCOUNTER — TELEPHONE (OUTPATIENT)
Dept: DERMATOLOGY | Age: 58
End: 2023-08-29

## 2023-08-29 NOTE — TELEPHONE ENCOUNTER
Future Appointments   Date Time Provider [address]   10/26/2023  3:30 PM MARIA Cool DWAINEP